# Patient Record
Sex: FEMALE | NOT HISPANIC OR LATINO | Employment: OTHER | URBAN - NONMETROPOLITAN AREA
[De-identification: names, ages, dates, MRNs, and addresses within clinical notes are randomized per-mention and may not be internally consistent; named-entity substitution may affect disease eponyms.]

---

## 2017-03-22 ENCOUNTER — OFFICE VISIT (OUTPATIENT)
Dept: FAMILY MEDICINE CLINIC | Facility: CLINIC | Age: 42
End: 2017-03-22

## 2017-03-22 VITALS
HEART RATE: 80 BPM | BODY MASS INDEX: 27.16 KG/M2 | TEMPERATURE: 97.8 F | OXYGEN SATURATION: 96 % | SYSTOLIC BLOOD PRESSURE: 112 MMHG | DIASTOLIC BLOOD PRESSURE: 78 MMHG | HEIGHT: 66 IN | WEIGHT: 169 LBS | RESPIRATION RATE: 17 BRPM

## 2017-03-22 DIAGNOSIS — F41.9 ANXIETY: Primary | ICD-10-CM

## 2017-03-22 DIAGNOSIS — R53.83 FATIGUE, UNSPECIFIED TYPE: ICD-10-CM

## 2017-03-22 DIAGNOSIS — Z00.00 ENCOUNTER FOR GENERAL ADULT MEDICAL EXAMINATION W/O ABNORMAL FINDINGS: ICD-10-CM

## 2017-03-22 DIAGNOSIS — M25.50 ARTHRALGIA, UNSPECIFIED JOINT: ICD-10-CM

## 2017-03-22 PROCEDURE — 99213 OFFICE O/P EST LOW 20 MIN: CPT | Performed by: NURSE PRACTITIONER

## 2017-03-22 RX ORDER — BUSPIRONE HYDROCHLORIDE 5 MG/1
5 TABLET ORAL 3 TIMES DAILY
Qty: 90 TABLET | Refills: 3 | Status: SHIPPED | OUTPATIENT
Start: 2017-03-22 | End: 2017-05-08

## 2017-03-22 RX ORDER — DOXEPIN HYDROCHLORIDE 75 MG/1
75 CAPSULE ORAL ONCE
Refills: 12 | COMMUNITY
Start: 2017-01-09 | End: 2017-09-19

## 2017-03-24 LAB
ALBUMIN SERPL-MCNC: 4.1 G/DL (ref 3.2–5.5)
ALBUMIN/GLOB SERPL: 1.4 G/DL (ref 1–3)
ALP SERPL-CCNC: 50 U/L (ref 15–121)
ALT SERPL W P-5'-P-CCNC: 22 U/L (ref 10–60)
ANION GAP SERPL CALCULATED.3IONS-SCNC: 12 MMOL/L (ref 5–15)
AST SERPL-CCNC: 22 U/L (ref 10–60)
BASOPHILS # BLD AUTO: 0.09 10*3/MM3 (ref 0–0.2)
BASOPHILS NFR BLD AUTO: 0.7 % (ref 0–2)
BILIRUB SERPL-MCNC: <0.3 MG/DL (ref 0.2–1)
BUN BLD-MCNC: 22 MG/DL (ref 8–25)
BUN/CREAT SERPL: 36.7 (ref 7–25)
CALCIUM SPEC-SCNC: 9.4 MG/DL (ref 8.4–10.8)
CHLORIDE SERPL-SCNC: 108 MMOL/L (ref 100–112)
CHOLEST SERPL-MCNC: 235 MG/DL (ref 150–200)
CK SERPL-CCNC: 39 U/L (ref 26–140)
CO2 SERPL-SCNC: 21 MMOL/L (ref 20–32)
CREAT BLD-MCNC: 0.6 MG/DL (ref 0.4–1.3)
CRP SERPL-MCNC: 0.8 MG/DL (ref 0–1)
DEPRECATED RDW RBC AUTO: 40.9 FL (ref 36.4–46.3)
EOSINOPHIL # BLD AUTO: 0.25 10*3/MM3 (ref 0–0.7)
EOSINOPHIL NFR BLD AUTO: 1.8 % (ref 0–7)
ERYTHROCYTE [DISTWIDTH] IN BLOOD BY AUTOMATED COUNT: 12.5 % (ref 11.5–14.5)
ERYTHROCYTE [SEDIMENTATION RATE] IN BLOOD: 10 MM/HR (ref 0–20)
GFR SERPL CREATININE-BSD FRML MDRD: 111 ML/MIN/1.73 (ref 64–149)
GFR SERPL CREATININE-BSD FRML MDRD: 135 ML/MIN/1.73 (ref 64–149)
GLOBULIN UR ELPH-MCNC: 3 GM/DL (ref 2.5–4.6)
GLUCOSE BLD-MCNC: 115 MG/DL (ref 70–100)
HBA1C MFR BLD: 5.97 % (ref 4–5.6)
HCT VFR BLD AUTO: 37 % (ref 35–45)
HDLC SERPL-MCNC: 32 MG/DL (ref 35–100)
HGB BLD-MCNC: 12.8 G/DL (ref 12–15.5)
LDLC SERPL CALC-MCNC: 163 MG/DL
LDLC/HDLC SERPL: 5.08 {RATIO}
LYMPHOCYTES # BLD AUTO: 4.37 10*3/MM3 (ref 0.6–4.2)
LYMPHOCYTES NFR BLD AUTO: 32 % (ref 10–50)
MCH RBC QN AUTO: 31.5 PG (ref 26.5–34)
MCHC RBC AUTO-ENTMCNC: 34.6 G/DL (ref 31.4–36)
MCV RBC AUTO: 91.1 FL (ref 80–98)
MONOCYTES # BLD AUTO: 0.46 10*3/MM3 (ref 0–0.9)
MONOCYTES NFR BLD AUTO: 3.4 % (ref 0–12)
NEUTROPHILS # BLD AUTO: 8.5 10*3/MM3 (ref 2–8.6)
NEUTROPHILS NFR BLD AUTO: 62.1 % (ref 37–80)
PLATELET # BLD AUTO: 330 10*3/MM3 (ref 150–450)
PMV BLD AUTO: 8.9 FL (ref 8–12)
POTASSIUM BLD-SCNC: 3.5 MMOL/L (ref 3.4–5.4)
PROT SERPL-MCNC: 7.1 G/DL (ref 6.7–8.2)
RBC # BLD AUTO: 4.06 10*6/MM3 (ref 3.77–5.16)
RHEUMATOID FACT SERPL-ACNC: NEGATIVE [IU]/ML
SODIUM BLD-SCNC: 141 MMOL/L (ref 134–146)
T4 FREE SERPL-MCNC: 1.34 NG/DL (ref 0.78–2.19)
TRIGL SERPL-MCNC: 202 MG/DL (ref 35–160)
TSH SERPL DL<=0.05 MIU/L-ACNC: 1.63 MIU/ML (ref 0.46–4.68)
VLDLC SERPL-MCNC: 40.4 MG/DL
WBC NRBC COR # BLD: 13.67 10*3/MM3 (ref 3.2–9.8)

## 2017-03-24 PROCEDURE — 86200 CCP ANTIBODY: CPT | Performed by: NURSE PRACTITIONER

## 2017-03-24 PROCEDURE — 86140 C-REACTIVE PROTEIN: CPT | Performed by: NURSE PRACTITIONER

## 2017-03-24 PROCEDURE — 86235 NUCLEAR ANTIGEN ANTIBODY: CPT | Performed by: NURSE PRACTITIONER

## 2017-03-24 PROCEDURE — 85025 COMPLETE CBC W/AUTO DIFF WBC: CPT | Performed by: NURSE PRACTITIONER

## 2017-03-24 PROCEDURE — 83036 HEMOGLOBIN GLYCOSYLATED A1C: CPT | Performed by: NURSE PRACTITIONER

## 2017-03-24 PROCEDURE — 84443 ASSAY THYROID STIM HORMONE: CPT | Performed by: NURSE PRACTITIONER

## 2017-03-24 PROCEDURE — 84439 ASSAY OF FREE THYROXINE: CPT | Performed by: NURSE PRACTITIONER

## 2017-03-24 PROCEDURE — 80061 LIPID PANEL: CPT | Performed by: NURSE PRACTITIONER

## 2017-03-24 PROCEDURE — 82550 ASSAY OF CK (CPK): CPT | Performed by: NURSE PRACTITIONER

## 2017-03-24 PROCEDURE — 36415 COLL VENOUS BLD VENIPUNCTURE: CPT | Performed by: NURSE PRACTITIONER

## 2017-03-24 PROCEDURE — 86225 DNA ANTIBODY NATIVE: CPT | Performed by: NURSE PRACTITIONER

## 2017-03-24 PROCEDURE — 80053 COMPREHEN METABOLIC PANEL: CPT | Performed by: NURSE PRACTITIONER

## 2017-03-24 PROCEDURE — 85651 RBC SED RATE NONAUTOMATED: CPT | Performed by: NURSE PRACTITIONER

## 2017-03-24 PROCEDURE — 86431 RHEUMATOID FACTOR QUANT: CPT | Performed by: NURSE PRACTITIONER

## 2017-03-27 LAB
CCP IGA+IGG SERPL IA-ACNC: 5 UNITS (ref 0–19)
CENTROMERE B AB SER-ACNC: <0.2 AI (ref 0–0.9)
CHROMATIN AB SERPL-ACNC: <0.2 AI (ref 0–0.9)
DSDNA AB SER-ACNC: <1 IU/ML (ref 0–9)
ENA JO1 AB SER-ACNC: <0.2 AI (ref 0–0.9)
ENA RNP AB SER-ACNC: <0.2 AI (ref 0–0.9)
ENA SCL70 AB SER-ACNC: <0.2 AI (ref 0–0.9)
ENA SM AB SER-ACNC: <0.2 AI (ref 0–0.9)
ENA SS-A AB SER-ACNC: <0.2 AI (ref 0–0.9)
ENA SS-B AB SER-ACNC: <0.2 AI (ref 0–0.9)
Lab: NORMAL

## 2017-03-29 ENCOUNTER — OFFICE VISIT (OUTPATIENT)
Dept: FAMILY MEDICINE CLINIC | Facility: CLINIC | Age: 42
End: 2017-03-29

## 2017-03-29 VITALS
BODY MASS INDEX: 27.16 KG/M2 | HEIGHT: 66 IN | TEMPERATURE: 97.8 F | OXYGEN SATURATION: 97 % | HEART RATE: 100 BPM | SYSTOLIC BLOOD PRESSURE: 110 MMHG | DIASTOLIC BLOOD PRESSURE: 78 MMHG | WEIGHT: 169 LBS | RESPIRATION RATE: 17 BRPM

## 2017-03-29 DIAGNOSIS — M25.50 ARTHRALGIA, UNSPECIFIED JOINT: ICD-10-CM

## 2017-03-29 DIAGNOSIS — R21 RASH: ICD-10-CM

## 2017-03-29 DIAGNOSIS — D72.829 LEUKOCYTOSIS, UNSPECIFIED TYPE: Primary | ICD-10-CM

## 2017-03-29 DIAGNOSIS — L56.8 PHOTOALLERGIC DERMATITIS: ICD-10-CM

## 2017-03-29 DIAGNOSIS — F41.9 ANXIETY: ICD-10-CM

## 2017-03-29 PROCEDURE — 99213 OFFICE O/P EST LOW 20 MIN: CPT | Performed by: NURSE PRACTITIONER

## 2017-03-29 RX ORDER — PROMETHAZINE HYDROCHLORIDE 25 MG/1
25 TABLET ORAL EVERY 6 HOURS PRN
Qty: 30 TABLET | Refills: 1 | Status: SHIPPED | OUTPATIENT
Start: 2017-03-29 | End: 2017-04-25 | Stop reason: SDUPTHER

## 2017-03-29 NOTE — PROGRESS NOTES
Chief Complaint   Patient presents with   • Joint Pain     f/u on labs       Subjective   HPI   Jeanie Jones is a 41 y.o. female presents to the office for evaluation of generalized arthralgia, rash to sun exposed areas of body with prominence to face and BUE, nausea, anxiety, and review of labwork. She had labwork completed on 3/24/2017. She voices continued c/o diffuse arthralgia and concern over this persistent rash to her face and BUE.     Given her family history with her mother being diagnosed with SLE and RA in her 40s, I was concerned Jeanie would also test positive. All autoimmune labwork is negative. She did have an elevated WBC. Will retest in 2 weeks to evaluate for chronic leukocytosis versus acute infection.   HPI    Family History   Problem Relation Age of Onset   • Rheum arthritis Mother    • Cancer Mother      lung cancer   • Heart disease Mother      Social History     Social History   • Marital status: Single     Spouse name: N/A   • Number of children: 1   • Years of education: N/A     Occupational History   • Not on file.     Social History Main Topics   • Smoking status: Current Some Day Smoker     Types: Electronic Cigarette   • Smokeless tobacco: Never Used      Comment: Does smoke occasionally uses ecig.    • Alcohol use No   • Drug use: No   • Sexual activity: No     Other Topics Concern   • Not on file     Social History Narrative       Review of Systems   Constitutional: Negative.  Negative for activity change, chills, fatigue, fever and unexpected weight change.   HENT: Negative.  Negative for congestion, ear pain, postnasal drip, rhinorrhea, sinus pressure and sore throat.    Eyes: Negative.  Negative for pain and redness.   Respiratory: Negative.  Negative for cough, choking, chest tightness, shortness of breath and wheezing.    Cardiovascular: Negative.  Negative for chest pain, palpitations and leg swelling.   Gastrointestinal: Negative.  Negative for abdominal distention,  "abdominal pain, constipation, diarrhea, nausea and rectal pain.   Endocrine: Negative.    Genitourinary: Negative.  Negative for decreased urine volume, difficulty urinating, dysuria, flank pain, hematuria and urgency.   Musculoskeletal: Positive for arthralgias and joint swelling. Negative for gait problem and neck pain.   Skin: Positive for rash. Negative for wound.   Allergic/Immunologic: Negative.    Neurological: Negative.  Negative for dizziness, syncope, weakness, light-headedness, numbness and headaches.   Hematological: Negative.    Psychiatric/Behavioral: Positive for decreased concentration. Negative for agitation, behavioral problems, confusion, self-injury, sleep disturbance and suicidal ideas. The patient is nervous/anxious.      14 Point ROS completed with pertinent positives discussed. All other systems reviewed and are negative.     The following portions of the patient's history were reviewed and updated as appropriate: allergies, current medications, past family history, past medical history, past social history, past surgical history and problem list.    Encounter Vitals  Vitals:    03/29/17 0853   BP: 110/78   Pulse: 100   Resp: 17   Temp: 97.8 °F (36.6 °C)   SpO2: 97%   Weight: 169 lb (76.7 kg)   Height: 66\" (167.6 cm)   PainSc:   6   PainLoc: Generalized       Objective:  Physical Exam   Constitutional: She is oriented to person, place, and time. Vital signs are normal. She appears well-developed and well-nourished.  Non-toxic appearance.   HENT:   Head: Normocephalic and atraumatic.   Right Ear: Tympanic membrane, external ear and ear canal normal.   Left Ear: Tympanic membrane, external ear and ear canal normal.   Nose: Nose normal.   Mouth/Throat: Uvula is midline, oropharynx is clear and moist and mucous membranes are normal. No posterior oropharyngeal edema or posterior oropharyngeal erythema.   Eyes: Lids are normal. Pupils are equal, round, and reactive to light.   Neck: Trachea normal " and normal range of motion. Neck supple. No thyroid mass present.   Cardiovascular: Normal rate, regular rhythm, S1 normal, S2 normal, normal heart sounds and intact distal pulses.  Exam reveals no gallop and no friction rub.    No murmur heard.  Pulmonary/Chest: Effort normal and breath sounds normal. No respiratory distress. She has no wheezes. She has no rales.   Abdominal: Soft. Normal appearance and bowel sounds are normal. She exhibits no mass. There is no rebound and no guarding.   Musculoskeletal: Normal range of motion.        Right elbow: Tenderness found.        Left elbow: Tenderness found.        Right knee: Tenderness found.        Left knee: Tenderness found.        Right hand: She exhibits tenderness. Decreased strength noted.        Left hand: She exhibits tenderness. Decreased strength noted.        Right foot: There is tenderness.        Left foot: There is tenderness.   Arthritic changes at bilateral elbows and bilateral knees   Lymphadenopathy:     She has no cervical adenopathy.   Neurological: She is alert and oriented to person, place, and time. She has normal strength. No cranial nerve deficit or sensory deficit. Gait normal.   Skin: Skin is warm and dry. Rash noted. Rash is macular. There is erythema.        Psychiatric: Her speech is normal and behavior is normal. Judgment and thought content normal. Her mood appears anxious. Cognition and memory are normal.   Nursing note and vitals reviewed.      Pertinent Labs  Office Visit on 03/22/2017   Component Date Value Ref Range Status   • Glucose 03/24/2017 115* 70 - 100 mg/dL Final   • BUN 03/24/2017 22  8 - 25 mg/dL Final   • Creatinine 03/24/2017 0.60  0.40 - 1.30 mg/dL Final   • Sodium 03/24/2017 141  134 - 146 mmol/L Final   • Potassium 03/24/2017 3.5  3.4 - 5.4 mmol/L Final   • Chloride 03/24/2017 108  100 - 112 mmol/L Final   • CO2 03/24/2017 21.0  20.0 - 32.0 mmol/L Final   • Calcium 03/24/2017 9.4  8.4 - 10.8 mg/dL Final   • Total  Protein 03/24/2017 7.1  6.7 - 8.2 g/dL Final   • Albumin 03/24/2017 4.10  3.20 - 5.50 g/dL Final   • ALT (SGPT) 03/24/2017 22  10 - 60 U/L Final   • AST (SGOT) 03/24/2017 22  10 - 60 U/L Final   • Alkaline Phosphatase 03/24/2017 50  15 - 121 U/L Final   • Total Bilirubin 03/24/2017 <0.3  0.2 - 1.0 mg/dL Final   • eGFR Non African Amer 03/24/2017 111  64 - 149 mL/min/1.73 Final   • eGFR  African Amer 03/24/2017 135  64 - 149 mL/min/1.73 Final   • Globulin 03/24/2017 3.0  2.5 - 4.6 gm/dL Final   • A/G Ratio 03/24/2017 1.4  1.0 - 3.0 g/dL Final   • BUN/Creatinine Ratio 03/24/2017 36.7* 7.0 - 25.0 Final   • Anion Gap 03/24/2017 12.0  5.0 - 15.0 mmol/L Final   • Hemoglobin A1C 03/24/2017 5.97* 4 - 5.6 % Final   • Total Cholesterol 03/24/2017 235* 150 - 200 mg/dL Final   • Triglycerides 03/24/2017 202* 35 - 160 mg/dL Final   • HDL Cholesterol 03/24/2017 32* 35 - 100 mg/dL Final   • LDL Cholesterol  03/24/2017 163  mg/dL Final   • VLDL Cholesterol 03/24/2017 40.4  mg/dL Final   • LDL/HDL Ratio 03/24/2017 5.08   Final   • TSH 03/24/2017 1.630  0.460 - 4.680 mIU/mL Final   • Free T4 03/24/2017 1.34  0.78 - 2.19 ng/dL Final   • Anti-DNA (DS) Ab Qn 03/24/2017 <1  0 - 9 IU/mL Final                                       Negative      <5                                     Equivocal  5 - 9                                     Positive      >9   • RNP Antibodies 03/24/2017 <0.2  0.0 - 0.9 AI Final   • Nieves Antibodies 03/24/2017 <0.2  0.0 - 0.9 AI Final   • Antiscleroderma-70 Antibodies 03/24/2017 <0.2  0.0 - 0.9 AI Final   • PARVIZ SSA (RO) Ab 03/24/2017 <0.2  0.0 - 0.9 AI Final   • PARVIZ SSB (LA) Ab 03/24/2017 <0.2  0.0 - 0.9 AI Final   • Antichromatin Antibodies 03/24/2017 <0.2  0.0 - 0.9 AI Final   • TRENT-1 IgG 03/24/2017 <0.2  0.0 - 0.9 AI Final   • Anti-Centromere B Antibodies 03/24/2017 <0.2  0.0 - 0.9 AI Final   • See below: 03/24/2017 Comment   Final    Comment: Autoantibody                       Disease  Association  ------------------------------------------------------------                          Condition                  Frequency  ---------------------   ------------------------   ---------  Antinuclear Antibody,    SLE, mixed connective  Direct (MANUEL-D)           tissue diseases  ---------------------   ------------------------   ---------  dsDNA                    SLE                        40 - 60%  ---------------------   ------------------------   ---------  Chromatin                Drug induced SLE                90%                           SLE                        48 - 97%  ---------------------   ------------------------   ---------  SSA (Ro)                 SLE                        25 - 35%                           Sjogren's Syndrome         40 - 70%                            Lupus                 100%  ---------------------   ------------------------   ---------  SSB (La)                 SLE                                                        10%                           Sjogren's Syndrome              30%  ---------------------   -----------------------    ---------  Sm (anti-Smith)          SLE                        15 - 30%  ---------------------   -----------------------    ---------  RNP                      Mixed Connective Tissue                           Disease                         95%  (U1 nRNP,                SLE                        30 - 50%  anti-ribonucleoprotein)  Polymyositis and/or                           Dermatomyositis                 20%  ---------------------   ------------------------   ---------  Scl-70 (antiDNA          Scleroderma (diffuse)      20 - 35%  topoisomerase)           Crest                           13%  ---------------------   ------------------------   ---------  Lashanda-1                     Polymyositis and/or                           Dermatomyositis            20 - 40%  ---------------------   ------------------------    ---------  Centromere B             Scleroderma -                            Crest                           variant                         80%   • CCP Antibodies IgG/IgA 03/24/2017 5  0 - 19 units Final                              Negative               <20                            Weak positive      20 - 39                            Moderate positive  40 - 59                            Strong positive        >59   • Creatine Kinase 03/24/2017 39  26 - 140 U/L Final   • C-Reactive Protein 03/24/2017 0.80  0.00 - 1.00 mg/dL Final   • Rheumatoid Factor Qualitative 03/24/2017 Negative  Negative Final   • Sed Rate 03/24/2017 10  0 - 20 mm/hr Final   • WBC 03/24/2017 13.67* 3.20 - 9.80 10*3/mm3 Final   • RBC 03/24/2017 4.06  3.77 - 5.16 10*6/mm3 Final   • Hemoglobin 03/24/2017 12.8  12.0 - 15.5 g/dL Final   • Hematocrit 03/24/2017 37.0  35.0 - 45.0 % Final   • MCV 03/24/2017 91.1  80.0 - 98.0 fL Final   • MCH 03/24/2017 31.5  26.5 - 34.0 pg Final   • MCHC 03/24/2017 34.6  31.4 - 36.0 g/dL Final   • RDW 03/24/2017 12.5  11.5 - 14.5 % Final   • RDW-SD 03/24/2017 40.9  36.4 - 46.3 fl Final   • MPV 03/24/2017 8.9  8.0 - 12.0 fL Final   • Platelets 03/24/2017 330  150 - 450 10*3/mm3 Final   • Neutrophil % 03/24/2017 62.1  37.0 - 80.0 % Final   • Lymphocyte % 03/24/2017 32.0  10.0 - 50.0 % Final   • Monocyte % 03/24/2017 3.4  0.0 - 12.0 % Final   • Eosinophil % 03/24/2017 1.8  0.0 - 7.0 % Final   • Basophil % 03/24/2017 0.7  0.0 - 2.0 % Final   • Neutrophils, Absolute 03/24/2017 8.50  2.00 - 8.60 10*3/mm3 Final   • Lymphocytes, Absolute 03/24/2017 4.37* 0.60 - 4.20 10*3/mm3 Final   • Monocytes, Absolute 03/24/2017 0.46  0.00 - 0.90 10*3/mm3 Final   • Eosinophils, Absolute 03/24/2017 0.25  0.00 - 0.70 10*3/mm3 Final   • Basophils, Absolute 03/24/2017 0.09  0.00 - 0.20 10*3/mm3 Final     Labs have been independently reviewed    Key Imaging/Tracings/POC Testing    Assessment and Plan  Jeanie was seen today for joint  pain.    Diagnoses and all orders for this visit:    Leukocytosis, unspecified type  -     CBC & Differential    Photoallergic dermatitis  -     Ambulatory Referral to Dermatology    Rash  -     Ambulatory Referral to Dermatology    Arthralgia, unspecified joint  -     Ambulatory Referral to Rheumatology    Anxiety    Other orders  -     promethazine (PHENERGAN) 25 MG tablet; Take 1 tablet by mouth Every 6 (Six) Hours As Needed for Nausea or Vomiting.      Side effects of ordered medications discussed with patient.     Additional Notes/Instructions  Refer to derm to assess for photoallergic dermatitis versus eczema versus unknown  Refer to rheumatology for diffuse arthralgia   Diet modifications to decrease LDL and increase HDL X 6 months  Follow-Up  Return if symptoms worsen or fail to improve, for after referrals completed.    Patient/caregiver verbalizes understanding of all orders and instructions in this plan of care.

## 2017-03-31 ENCOUNTER — OFFICE VISIT (OUTPATIENT)
Dept: FAMILY MEDICINE CLINIC | Facility: CLINIC | Age: 42
End: 2017-03-31

## 2017-03-31 VITALS
SYSTOLIC BLOOD PRESSURE: 120 MMHG | TEMPERATURE: 98.2 F | OXYGEN SATURATION: 96 % | DIASTOLIC BLOOD PRESSURE: 82 MMHG | BODY MASS INDEX: 27.16 KG/M2 | HEART RATE: 89 BPM | WEIGHT: 169 LBS | HEIGHT: 66 IN

## 2017-03-31 DIAGNOSIS — R50.81 FEVER IN OTHER DISEASES: ICD-10-CM

## 2017-03-31 DIAGNOSIS — J01.10 ACUTE FRONTAL SINUSITIS, RECURRENCE NOT SPECIFIED: Primary | ICD-10-CM

## 2017-03-31 LAB
EXPIRATION DATE: ABNORMAL
FLUAV AG NPH QL: ABNORMAL
FLUBV AG NPH QL: ABNORMAL
INTERNAL CONTROL: ABNORMAL
Lab: ABNORMAL

## 2017-03-31 PROCEDURE — 87804 INFLUENZA ASSAY W/OPTIC: CPT | Performed by: NURSE PRACTITIONER

## 2017-03-31 PROCEDURE — 99214 OFFICE O/P EST MOD 30 MIN: CPT | Performed by: NURSE PRACTITIONER

## 2017-03-31 RX ORDER — AMOXICILLIN AND CLAVULANATE POTASSIUM 875; 125 MG/1; MG/1
1 TABLET, FILM COATED ORAL 2 TIMES DAILY
Qty: 20 TABLET | Refills: 0 | Status: SHIPPED | OUTPATIENT
Start: 2017-03-31 | End: 2017-04-10

## 2017-03-31 RX ORDER — FLUCONAZOLE 150 MG/1
150 TABLET ORAL ONCE
Qty: 2 TABLET | Refills: 0 | Status: SHIPPED | OUTPATIENT
Start: 2017-03-31 | End: 2017-03-31

## 2017-03-31 NOTE — PROGRESS NOTES
Chief Complaint   Patient presents with   • Illness     Possible flu started Tuesday. Sore thorat,headache,body aches.        Subjective   HPI   Jeanie Jones is a 41 y.o. female presents to the office for evaluation of:  Illness   This is a new problem. The current episode started in the past 7 days. The problem occurs constantly. The problem has been unchanged. Associated symptoms include arthralgias, chest pain, congestion, coughing, fatigue, a fever (99.9), myalgias and a sore throat. Pertinent negatives include no abdominal pain, chills, headaches, nausea, neck pain, numbness, rash or weakness. Nothing aggravates the symptoms. She has tried nothing for the symptoms. The treatment provided no relief.     She states she felt really bad yesterday, but she is feeling better today.   Today she presents with no acute complaints.     Family History   Problem Relation Age of Onset   • Rheum arthritis Mother    • Cancer Mother      lung cancer   • Heart disease Mother      Social History     Social History   • Marital status: Single     Spouse name: N/A   • Number of children: 1   • Years of education: N/A     Occupational History   • Not on file.     Social History Main Topics   • Smoking status: Current Some Day Smoker     Types: Electronic Cigarette   • Smokeless tobacco: Never Used      Comment: Does smoke occasionally uses ecig.    • Alcohol use No   • Drug use: No   • Sexual activity: No     Other Topics Concern   • Not on file     Social History Narrative       Review of Systems   Constitutional: Positive for fatigue and fever (99.9). Negative for activity change, chills and unexpected weight change.   HENT: Positive for congestion, sinus pressure and sore throat. Negative for ear pain, postnasal drip and rhinorrhea.    Eyes: Negative.  Negative for pain and redness.   Respiratory: Positive for cough. Negative for choking, chest tightness, shortness of breath and wheezing.    Cardiovascular: Positive for  "chest pain. Negative for palpitations and leg swelling.   Gastrointestinal: Negative.  Negative for abdominal distention, abdominal pain, constipation, diarrhea, nausea and rectal pain.   Endocrine: Negative.    Genitourinary: Negative.  Negative for decreased urine volume, difficulty urinating, dysuria, flank pain, hematuria and urgency.   Musculoskeletal: Positive for arthralgias and myalgias. Negative for gait problem and neck pain.   Skin: Negative.  Negative for rash and wound.   Allergic/Immunologic: Negative.    Neurological: Negative.  Negative for dizziness, syncope, weakness, light-headedness, numbness and headaches.   Hematological: Negative.    Psychiatric/Behavioral: Negative.  Negative for agitation, behavioral problems, confusion, self-injury, sleep disturbance and suicidal ideas. The patient is not nervous/anxious.      14 Point ROS completed with pertinent positives discussed. All other systems reviewed and are negative.     The following portions of the patient's history were reviewed and updated as appropriate: allergies, current medications, past family history, past medical history, past social history, past surgical history and problem list.    Encounter Vitals  Vitals:    03/31/17 1018   BP: 120/82   Pulse: 89   Temp: 98.2 °F (36.8 °C)   SpO2: 96%   Weight: 169 lb (76.7 kg)   Height: 66\" (167.6 cm)   PainSc: 0-No pain       Objective:  Physical Exam   Constitutional: She is oriented to person, place, and time. Vital signs are normal. She appears well-developed and well-nourished.   HENT:   Head: Normocephalic and atraumatic.   Right Ear: Tympanic membrane, external ear and ear canal normal.   Left Ear: Tympanic membrane, external ear and ear canal normal.   Nose: Right sinus exhibits maxillary sinus tenderness and frontal sinus tenderness. Left sinus exhibits maxillary sinus tenderness and frontal sinus tenderness.   Mouth/Throat: Uvula is midline, oropharynx is clear and moist and mucous " membranes are normal. No posterior oropharyngeal edema or posterior oropharyngeal erythema. No tonsillar exudate.   Clear post nasal drip   Eyes: Lids are normal. Pupils are equal, round, and reactive to light.   Neck: Trachea normal and normal range of motion. Neck supple. No thyroid mass present.   Cardiovascular: Normal rate, regular rhythm, S1 normal, S2 normal, normal heart sounds and intact distal pulses.  Exam reveals no gallop and no friction rub.    No murmur heard.  Pulmonary/Chest: Effort normal and breath sounds normal. No respiratory distress. She has no wheezes. She has no rales.   Abdominal: Soft. Normal appearance and bowel sounds are normal. She exhibits no mass. There is no rebound and no guarding.   Musculoskeletal: Normal range of motion.   Lymphadenopathy:     She has no cervical adenopathy.   Neurological: She is alert and oriented to person, place, and time. She has normal strength. No cranial nerve deficit or sensory deficit. Gait normal.   Skin: Skin is warm and dry. No rash noted.   Psychiatric: She has a normal mood and affect. Her speech is normal and behavior is normal. Judgment and thought content normal. Cognition and memory are normal.   Nursing note and vitals reviewed.      Pertinent Labs      Key Imaging/Tracings/POC Testing  Lab Results   Component Value Date    RAPFLUA NEG 03/31/2017    RAPFLUB NEG 03/31/2017     Assessment and Plan  Jeanie was seen today for illness.    Diagnoses and all orders for this visit:    Acute frontal sinusitis, recurrence not specified    Fever in other diseases  -     POCT Influenza A/B    Other orders  -     amoxicillin-clavulanate (AUGMENTIN) 875-125 MG per tablet; Take 1 tablet by mouth 2 (Two) Times a Day for 10 days.  -     fluconazole (DIFLUCAN) 150 MG tablet; Take 1 tablet by mouth 1 (One) Time for 1 dose.      Side effects of ordered medications discussed with patient.     Additional Notes/Instructions    Follow-Up  Return if symptoms worsen or  fail to improve.    Patient/caregiver verbalizes understanding of all orders and instructions in this plan of care.

## 2017-04-25 RX ORDER — PROMETHAZINE HYDROCHLORIDE 25 MG/1
25 TABLET ORAL EVERY 6 HOURS PRN
Qty: 60 TABLET | Refills: 1 | Status: SHIPPED | OUTPATIENT
Start: 2017-04-25 | End: 2017-06-21 | Stop reason: SDUPTHER

## 2017-05-08 ENCOUNTER — OFFICE VISIT (OUTPATIENT)
Dept: FAMILY MEDICINE CLINIC | Facility: CLINIC | Age: 42
End: 2017-05-08

## 2017-05-08 VITALS
TEMPERATURE: 98.2 F | WEIGHT: 173 LBS | BODY MASS INDEX: 27.8 KG/M2 | SYSTOLIC BLOOD PRESSURE: 132 MMHG | DIASTOLIC BLOOD PRESSURE: 84 MMHG | RESPIRATION RATE: 17 BRPM | OXYGEN SATURATION: 95 % | HEIGHT: 66 IN | HEART RATE: 127 BPM

## 2017-05-08 DIAGNOSIS — R21 RASH: Primary | ICD-10-CM

## 2017-05-08 DIAGNOSIS — F41.9 ANXIETY: ICD-10-CM

## 2017-05-08 DIAGNOSIS — L50.9 URTICARIA: ICD-10-CM

## 2017-05-08 PROCEDURE — 96372 THER/PROPH/DIAG INJ SC/IM: CPT | Performed by: NURSE PRACTITIONER

## 2017-05-08 PROCEDURE — 99213 OFFICE O/P EST LOW 20 MIN: CPT | Performed by: NURSE PRACTITIONER

## 2017-05-08 RX ORDER — FAMOTIDINE 40 MG/1
40 TABLET, FILM COATED ORAL DAILY
Qty: 30 TABLET | Refills: 0 | Status: SHIPPED | OUTPATIENT
Start: 2017-05-08 | End: 2017-06-21 | Stop reason: SDUPTHER

## 2017-05-08 RX ORDER — HYDROXYZINE 50 MG/1
50 TABLET, FILM COATED ORAL EVERY 6 HOURS PRN
Qty: 60 TABLET | Refills: 3 | Status: SHIPPED | OUTPATIENT
Start: 2017-05-08 | End: 2017-09-18 | Stop reason: SDUPTHER

## 2017-05-08 RX ORDER — METHYLPREDNISOLONE ACETATE 80 MG/ML
80 INJECTION, SUSPENSION INTRA-ARTICULAR; INTRALESIONAL; INTRAMUSCULAR; SOFT TISSUE ONCE
Status: COMPLETED | OUTPATIENT
Start: 2017-05-08 | End: 2017-05-08

## 2017-05-08 RX ADMIN — METHYLPREDNISOLONE ACETATE 80 MG: 80 INJECTION, SUSPENSION INTRA-ARTICULAR; INTRALESIONAL; INTRAMUSCULAR; SOFT TISSUE at 11:22

## 2017-05-09 RX ORDER — PREDNISONE 20 MG/1
20 TABLET ORAL 2 TIMES DAILY
Qty: 10 TABLET | Refills: 0 | Status: SHIPPED | OUTPATIENT
Start: 2017-05-09 | End: 2017-08-18

## 2017-06-21 RX ORDER — PROMETHAZINE HYDROCHLORIDE 25 MG/1
TABLET ORAL
Qty: 60 TABLET | Refills: 1 | Status: SHIPPED | OUTPATIENT
Start: 2017-06-21 | End: 2017-08-18 | Stop reason: SDUPTHER

## 2017-06-21 RX ORDER — FAMOTIDINE 40 MG/1
TABLET, FILM COATED ORAL
Qty: 30 TABLET | Refills: 0 | Status: SHIPPED | OUTPATIENT
Start: 2017-06-21 | End: 2017-08-19 | Stop reason: SDUPTHER

## 2017-08-18 ENCOUNTER — OFFICE VISIT (OUTPATIENT)
Dept: FAMILY MEDICINE CLINIC | Facility: CLINIC | Age: 42
End: 2017-08-18

## 2017-08-18 VITALS
WEIGHT: 170 LBS | OXYGEN SATURATION: 93 % | RESPIRATION RATE: 18 BRPM | SYSTOLIC BLOOD PRESSURE: 124 MMHG | HEIGHT: 66 IN | DIASTOLIC BLOOD PRESSURE: 86 MMHG | BODY MASS INDEX: 27.32 KG/M2 | TEMPERATURE: 97.8 F | HEART RATE: 86 BPM

## 2017-08-18 DIAGNOSIS — R81 GLUCOSURIA: ICD-10-CM

## 2017-08-18 DIAGNOSIS — R79.9 ABNORMAL FINDING OF BLOOD CHEMISTRY: ICD-10-CM

## 2017-08-18 DIAGNOSIS — N30.01 ACUTE CYSTITIS WITH HEMATURIA: ICD-10-CM

## 2017-08-18 DIAGNOSIS — R30.0 DYSURIA: Primary | ICD-10-CM

## 2017-08-18 LAB
BILIRUB BLD-MCNC: NEGATIVE MG/DL
CLARITY, POC: CLEAR
COLOR UR: YELLOW
GLUCOSE UR STRIP-MCNC: ABNORMAL MG/DL
KETONES UR QL: NEGATIVE
LEUKOCYTE EST, POC: NEGATIVE
NITRITE UR-MCNC: POSITIVE MG/ML
PH UR: 6 [PH] (ref 5–8)
PROT UR STRIP-MCNC: NEGATIVE MG/DL
RBC # UR STRIP: ABNORMAL /UL
SP GR UR: 1.02 (ref 1–1.03)
UROBILINOGEN UR QL: NORMAL

## 2017-08-18 PROCEDURE — 87086 URINE CULTURE/COLONY COUNT: CPT | Performed by: NURSE PRACTITIONER

## 2017-08-18 PROCEDURE — 81003 URINALYSIS AUTO W/O SCOPE: CPT | Performed by: NURSE PRACTITIONER

## 2017-08-18 PROCEDURE — 96372 THER/PROPH/DIAG INJ SC/IM: CPT | Performed by: NURSE PRACTITIONER

## 2017-08-18 PROCEDURE — 99214 OFFICE O/P EST MOD 30 MIN: CPT | Performed by: NURSE PRACTITIONER

## 2017-08-18 RX ORDER — NITROFURANTOIN 25; 75 MG/1; MG/1
100 CAPSULE ORAL 2 TIMES DAILY
Qty: 14 CAPSULE | Refills: 0 | Status: SHIPPED | OUTPATIENT
Start: 2017-08-18 | End: 2017-09-01

## 2017-08-18 RX ORDER — CEFTRIAXONE 1 G/1
1 INJECTION, POWDER, FOR SOLUTION INTRAMUSCULAR; INTRAVENOUS ONCE
Status: COMPLETED | OUTPATIENT
Start: 2017-08-18 | End: 2017-08-18

## 2017-08-18 RX ORDER — PROMETHAZINE HYDROCHLORIDE 25 MG/1
25 TABLET ORAL EVERY 6 HOURS PRN
Qty: 60 TABLET | Refills: 1 | Status: SHIPPED | OUTPATIENT
Start: 2017-08-18 | End: 2017-10-05 | Stop reason: SDUPTHER

## 2017-08-18 RX ORDER — PHENAZOPYRIDINE HYDROCHLORIDE 200 MG/1
200 TABLET, FILM COATED ORAL 3 TIMES DAILY PRN
Qty: 30 TABLET | Refills: 0 | Status: SHIPPED | OUTPATIENT
Start: 2017-08-18 | End: 2017-09-19

## 2017-08-18 RX ADMIN — CEFTRIAXONE 1 G: 1 INJECTION, POWDER, FOR SOLUTION INTRAMUSCULAR; INTRAVENOUS at 15:14

## 2017-08-18 NOTE — PROGRESS NOTES
Chief Complaint   Patient presents with   • Urinary Tract Infection     onset couple months ago       Subjective   Jeanie Jones is a 42 y.o. female presents to the office for evaluation of UTI like symptoms X 2-3 months ago.    HPI   She states she has had lower back pain and right flank pain. She has been taking AZO with good relief of symptoms. She has had intermittent fever. Associated symptoms include frequency and dysuria in absence of AZO. She denies urgency. She has also had intermittent hematuria. She has not been in to see PCP because she has been treating symptoms with AZO with good relief.     She was previously followed by Dr. Armenta, urology. She has had frequent kidney stones. She denies feeling like she has a kidney stone today.     Urinary Tract Infection    This is a new problem. The current episode started more than 1 month ago. The problem occurs intermittently. The problem has been unchanged. The quality of the pain is described as burning and stabbing. The pain is at a severity of 6/10. The pain is mild. There has been no fever. The fever has been present for less than 1 day. She is not sexually active. There is no history of pyelonephritis. Associated symptoms include flank pain, frequency, hematuria and nausea. Pertinent negatives include no chills or urgency. Treatments tried: azo. The treatment provided no relief. Her past medical history is significant for kidney stones.     Family History   Problem Relation Age of Onset   • Rheum arthritis Mother    • Cancer Mother      lung cancer   • Heart disease Mother      Social History     Social History   • Marital status: Single     Spouse name: N/A   • Number of children: 1   • Years of education: N/A     Occupational History   • Not on file.     Social History Main Topics   • Smoking status: Current Some Day Smoker     Types: Electronic Cigarette   • Smokeless tobacco: Never Used      Comment: Does smoke occasionally uses ecig.    • Alcohol  "use No   • Drug use: No   • Sexual activity: No     Other Topics Concern   • Not on file     Social History Narrative       The following portions of the patient's history were reviewed and updated as appropriate: allergies, current medications, past family history, past medical history, past social history, past surgical history and problem list.    Review of Systems   Constitutional: Negative.  Negative for activity change, chills, fatigue, fever and unexpected weight change.   HENT: Negative.  Negative for congestion, ear pain, postnasal drip, rhinorrhea, sinus pressure and sore throat.    Eyes: Negative.  Negative for pain and redness.   Respiratory: Negative.  Negative for cough, choking, chest tightness, shortness of breath and wheezing.    Cardiovascular: Negative.  Negative for chest pain, palpitations and leg swelling.   Gastrointestinal: Positive for nausea. Negative for abdominal distention, abdominal pain, constipation, diarrhea and rectal pain.   Endocrine: Negative.    Genitourinary: Positive for difficulty urinating, flank pain, frequency and hematuria. Negative for decreased urine volume, dysuria and urgency.   Musculoskeletal: Negative for gait problem and neck pain.   Skin: Negative.  Negative for rash and wound.   Allergic/Immunologic: Negative.    Neurological: Negative.  Negative for dizziness, syncope, weakness, light-headedness, numbness and headaches.   Hematological: Negative.    Psychiatric/Behavioral: Negative.  Negative for agitation, behavioral problems, confusion, self-injury, sleep disturbance and suicidal ideas. The patient is not nervous/anxious.      14 Point ROS completed with pertinent positives discussed. All other systems reviewed and are negative.       Objective   Encounter Vitals  /86  Pulse 86  Temp 97.8 °F (36.6 °C) (Tympanic)   Resp 18  Ht 66\" (167.6 cm)  Wt 170 lb (77.1 kg)  SpO2 93%  Breastfeeding? No  BMI 27.44 kg/m2    Physical Exam   Constitutional: She " is oriented to person, place, and time. Vital signs are normal. She appears well-developed and well-nourished.   HENT:   Head: Normocephalic and atraumatic.   Right Ear: Tympanic membrane, external ear and ear canal normal.   Left Ear: Tympanic membrane, external ear and ear canal normal.   Nose: Nose normal.   Mouth/Throat: Uvula is midline, oropharynx is clear and moist and mucous membranes are normal. No posterior oropharyngeal edema or posterior oropharyngeal erythema.   Eyes: Lids are normal. Pupils are equal, round, and reactive to light.   Neck: Trachea normal and normal range of motion. Neck supple. No thyroid mass present.   Cardiovascular: Normal rate, regular rhythm, S1 normal, S2 normal, normal heart sounds and intact distal pulses.  Exam reveals no gallop and no friction rub.    No murmur heard.  Pulmonary/Chest: Effort normal and breath sounds normal. No respiratory distress. She has no wheezes. She has no rales.   Abdominal: Soft. Normal appearance and bowel sounds are normal. She exhibits no mass. There is tenderness in the suprapubic area. There is CVA tenderness. There is no rebound and no guarding.   Musculoskeletal: Normal range of motion.   Lymphadenopathy:     She has no cervical adenopathy.   Neurological: She is alert and oriented to person, place, and time. She has normal strength. No cranial nerve deficit or sensory deficit. Gait normal.   Skin: Skin is warm and dry. No rash noted.   Psychiatric: She has a normal mood and affect. Her speech is normal and behavior is normal. Judgment and thought content normal. Cognition and memory are normal.   Nursing note and vitals reviewed.      Pertinent Labs  Office Visit on 08/18/2017   Component Date Value Ref Range Status   • Color 08/18/2017 Yellow  Yellow, Straw, Dark Yellow, Maureen Final   • Clarity, UA 08/18/2017 Clear  Clear Final   • Glucose, UA 08/18/2017 3+* Negative, 1000 mg/dL (3+) mg/dL Final   • Bilirubin 08/18/2017 Negative  Negative  Final   • Ketones, UA 08/18/2017 Negative  Negative Final   • Specific Gravity  08/18/2017 1.025  1.005 - 1.030 Final   • Blood, UA 08/18/2017 Trace* Negative Final   • pH, Urine 08/18/2017 6.0  5.0 - 8.0 Final   • Protein, POC 08/18/2017 Negative  Negative mg/dL Final   • Urobilinogen, UA 08/18/2017 Normal  Normal Final   • Leukocytes 08/18/2017 Negative  Negative Final   • Nitrite, UA 08/18/2017 Positive* Negative Final     Labs have been independently reviewed    Key Imaging/Tracings/POC Testing    Assessment and Medications  Problems Addressed this Visit     None      Visit Diagnoses     Dysuria    -  Primary    Relevant Orders    POCT urinalysis dipstick, automated (Completed)    Acute cystitis with hematuria        Relevant Medications    nitrofurantoin, macrocrystal-monohydrate, (MACROBID) 100 MG capsule    phenazopyridine (PYRIDIUM) 200 MG tablet    cefTRIAXone (ROCEPHIN) injection 1 g (Start on 8/18/2017  3:30 PM)    Glucosuria        Relevant Orders    CBC & Differential    Comprehensive Metabolic Panel    Hemoglobin A1c    Abnormal finding of blood chemistry         Relevant Orders    Hemoglobin A1c        Side effects of ordered medications discussed with patient.     Plan/Additional Notes/Instructions  Plan   1. Rocephin injection today  2. Start macrobid today  3. Stop AZO  4. May start pyridium PRN X 2 days  5. Increase PO water intake  6. Complete fasting labs on Monday  7. Will call with results of above ordered studies and advance/change POC as needed after review    Follow-Up  Return in about 1 week (around 8/25/2017), or if symptoms worsen or fail to improve.    Patient/caregiver verbalizes understanding of all orders and instructions in this plan of care.       This document has been electronically signed by LEILANI Gallagher on August 18, 2017 2:54 PM

## 2017-08-20 LAB — BACTERIA SPEC AEROBE CULT: NORMAL

## 2017-08-22 LAB
ALBUMIN SERPL-MCNC: 3.9 G/DL (ref 3.2–5.5)
ALBUMIN/GLOB SERPL: 1.2 G/DL (ref 1–3)
ALP SERPL-CCNC: 43 U/L (ref 15–121)
ALT SERPL W P-5'-P-CCNC: 35 U/L (ref 10–60)
ANION GAP SERPL CALCULATED.3IONS-SCNC: 15 MMOL/L (ref 5–15)
AST SERPL-CCNC: 24 U/L (ref 10–60)
BILIRUB SERPL-MCNC: 0.4 MG/DL (ref 0.2–1)
BUN BLD-MCNC: 18 MG/DL (ref 8–25)
BUN/CREAT SERPL: 22.5 (ref 7–25)
CALCIUM SPEC-SCNC: 9.2 MG/DL (ref 8.4–10.8)
CHLORIDE SERPL-SCNC: 97 MMOL/L (ref 100–112)
CO2 SERPL-SCNC: 26 MMOL/L (ref 20–32)
CREAT BLD-MCNC: 0.8 MG/DL (ref 0.4–1.3)
DEPRECATED RDW RBC AUTO: 42.7 FL (ref 36.4–46.3)
EOSINOPHIL # BLD MANUAL: 0.7 10*3/MM3 (ref 0–0.7)
EOSINOPHIL NFR BLD MANUAL: 3 % (ref 0–7)
ERYTHROCYTE [DISTWIDTH] IN BLOOD BY AUTOMATED COUNT: 12.7 % (ref 11.5–14.5)
GFR SERPL CREATININE-BSD FRML MDRD: 79 ML/MIN/1.73 (ref 55–135)
GFR SERPL CREATININE-BSD FRML MDRD: 96 ML/MIN/1.73 (ref 58–135)
GLOBULIN UR ELPH-MCNC: 3.3 GM/DL (ref 2.5–4.6)
GLUCOSE BLD-MCNC: 113 MG/DL (ref 70–100)
HCT VFR BLD AUTO: 42.5 % (ref 35–45)
HGB BLD-MCNC: 14.2 G/DL (ref 12–15.5)
LYMPHOCYTES # BLD MANUAL: 9.58 10*3/MM3 (ref 0.6–4.2)
LYMPHOCYTES NFR BLD MANUAL: 4 % (ref 0–12)
LYMPHOCYTES NFR BLD MANUAL: 41 % (ref 10–50)
MCH RBC QN AUTO: 30.9 PG (ref 26.5–34)
MCHC RBC AUTO-ENTMCNC: 33.4 G/DL (ref 31.4–36)
MCV RBC AUTO: 92.4 FL (ref 80–98)
MONOCYTES # BLD AUTO: 0.93 10*3/MM3 (ref 0–0.9)
NEUTROPHILS # BLD AUTO: 12.15 10*3/MM3 (ref 2–8.6)
NEUTROPHILS NFR BLD MANUAL: 52 % (ref 37–80)
PLATELET # BLD AUTO: 427 10*3/MM3 (ref 150–450)
PMV BLD AUTO: 8.6 FL (ref 8–12)
POTASSIUM BLD-SCNC: 3.5 MMOL/L (ref 3.4–5.4)
PROT SERPL-MCNC: 7.2 G/DL (ref 6.7–8.2)
RBC # BLD AUTO: 4.6 10*6/MM3 (ref 3.77–5.16)
RBC MORPH BLD: NORMAL
SMALL PLATELETS BLD QL SMEAR: ADEQUATE
SODIUM BLD-SCNC: 138 MMOL/L (ref 134–146)
WBC MORPH BLD: NORMAL
WBC NRBC COR # BLD: 23.36 10*3/MM3 (ref 3.2–9.8)

## 2017-08-22 PROCEDURE — 36415 COLL VENOUS BLD VENIPUNCTURE: CPT | Performed by: NURSE PRACTITIONER

## 2017-08-22 PROCEDURE — 83036 HEMOGLOBIN GLYCOSYLATED A1C: CPT | Performed by: NURSE PRACTITIONER

## 2017-08-22 PROCEDURE — 80053 COMPREHEN METABOLIC PANEL: CPT | Performed by: NURSE PRACTITIONER

## 2017-08-22 PROCEDURE — 85025 COMPLETE CBC W/AUTO DIFF WBC: CPT | Performed by: NURSE PRACTITIONER

## 2017-08-22 RX ORDER — FAMOTIDINE 40 MG/1
TABLET, FILM COATED ORAL
Qty: 30 TABLET | Refills: 0 | Status: SHIPPED | OUTPATIENT
Start: 2017-08-22 | End: 2017-09-18 | Stop reason: SDUPTHER

## 2017-08-23 ENCOUNTER — OFFICE VISIT (OUTPATIENT)
Dept: FAMILY MEDICINE CLINIC | Facility: CLINIC | Age: 42
End: 2017-08-23

## 2017-08-23 VITALS
HEIGHT: 66 IN | OXYGEN SATURATION: 94 % | BODY MASS INDEX: 27.32 KG/M2 | TEMPERATURE: 98.2 F | RESPIRATION RATE: 18 BRPM | HEART RATE: 99 BPM | WEIGHT: 170 LBS | DIASTOLIC BLOOD PRESSURE: 86 MMHG | SYSTOLIC BLOOD PRESSURE: 128 MMHG

## 2017-08-23 DIAGNOSIS — N39.0 URINARY TRACT INFECTION, SITE UNSPECIFIED: ICD-10-CM

## 2017-08-23 DIAGNOSIS — E11.9 TYPE 2 DIABETES MELLITUS WITHOUT COMPLICATION, WITHOUT LONG-TERM CURRENT USE OF INSULIN (HCC): Primary | ICD-10-CM

## 2017-08-23 PROBLEM — K21.9 GERD (GASTROESOPHAGEAL REFLUX DISEASE): Status: ACTIVE | Noted: 2017-08-23

## 2017-08-23 LAB
BILIRUB BLD-MCNC: NEGATIVE MG/DL
CLARITY, POC: CLEAR
COLOR UR: YELLOW
GLUCOSE UR STRIP-MCNC: NEGATIVE MG/DL
HBA1C MFR BLD: 7.3 % (ref 4–5.6)
KETONES UR QL: NEGATIVE
LEUKOCYTE EST, POC: NEGATIVE
NITRITE UR-MCNC: NEGATIVE MG/ML
PH UR: 5.5 [PH] (ref 5–8)
PROT UR STRIP-MCNC: NEGATIVE MG/DL
RBC # UR STRIP: ABNORMAL /UL
SP GR UR: 1.01 (ref 1–1.03)
UROBILINOGEN UR QL: NORMAL

## 2017-08-23 PROCEDURE — 99214 OFFICE O/P EST MOD 30 MIN: CPT | Performed by: NURSE PRACTITIONER

## 2017-08-23 PROCEDURE — 81003 URINALYSIS AUTO W/O SCOPE: CPT | Performed by: NURSE PRACTITIONER

## 2017-08-23 PROCEDURE — 96372 THER/PROPH/DIAG INJ SC/IM: CPT | Performed by: NURSE PRACTITIONER

## 2017-08-23 RX ORDER — METFORMIN HYDROCHLORIDE 500 MG/1
1000 TABLET, EXTENDED RELEASE ORAL 2 TIMES DAILY WITH MEALS
Qty: 60 TABLET | Refills: 5 | Status: SHIPPED | OUTPATIENT
Start: 2017-08-23 | End: 2018-04-09

## 2017-08-23 RX ORDER — CEFDINIR 300 MG/1
300 CAPSULE ORAL 2 TIMES DAILY
Qty: 20 CAPSULE | Refills: 0 | Status: SHIPPED | OUTPATIENT
Start: 2017-08-23 | End: 2017-09-01

## 2017-08-23 RX ORDER — CEFTRIAXONE 1 G/1
1 INJECTION, POWDER, FOR SOLUTION INTRAMUSCULAR; INTRAVENOUS ONCE
Status: COMPLETED | OUTPATIENT
Start: 2017-08-23 | End: 2017-08-23

## 2017-08-23 RX ADMIN — CEFTRIAXONE 1 G: 1 INJECTION, POWDER, FOR SOLUTION INTRAMUSCULAR; INTRAVENOUS at 14:14

## 2017-08-23 NOTE — PATIENT INSTRUCTIONS
Pyelonephritis, Adult  Pyelonephritis is a kidney infection. The kidneys are the organs that filter a person's blood and move waste out of the bloodstream and into the urine. Urine passes from the kidneys, through the ureters, and into the bladder. There are two main types of pyelonephritis:  · Infections that come on quickly without any warning (acute pyelonephritis).  · Infections that last for a long period of time (chronic pyelonephritis).  In most cases, the infection clears up with treatment and does not cause further problems. More severe infections or chronic infections can sometimes spread to the bloodstream or lead to other problems with the kidneys.  CAUSES  This condition is usually caused by:  · Bacteria traveling from the bladder to the kidney through infected urine. The urine in the bladder can become infected with bacteria from:    Bladder infection (cystitis).    Inflammation of the prostate gland (prostatitis).    Sexual intercourse, in females.  · Bacteria traveling from the bloodstream to the kidney.  RISK FACTORS  This condition is more likely to develop in:  · Pregnant women.  · Older people.  · People who have diabetes.  · People who have kidney stones or bladder stones.  · People who have other abnormalities of the kidney or ureter.  · People who have a catheter placed in the bladder.  · People who have cancer.  · People who are sexually active.  · Women who use spermicides.  · People who have had a prior urinary tract infection.  SYMPTOMS  Symptoms of this condition include:  · Frequent urination.  · Strong or persistent urge to urinate.  · Burning or stinging when urinating.  · Abdominal pain.  · Back pain.  · Pain in the side or flank area.  · Fever.  · Chills.  · Blood in the urine, or dark urine.  · Nausea.  · Vomiting.  DIAGNOSIS  This condition may be diagnosed based on:  · Medical history and physical exam.  · Urine tests.  · Blood tests.  You may also have imaging tests of the  kidneys, such as an ultrasound or CT scan.  TREATMENT  Treatment for this condition may depend on the severity of the infection.  · If the infection is mild and is found early, you may be treated with antibiotic medicines taken by mouth. You will need to drink fluids to remain hydrated.  · If the infection is more severe, you may need to stay in the hospital and receive antibiotics given directly into a vein through an IV tube. You may also need to receive fluids through an IV tube if you are not able to remain hydrated. After your hospital stay, you may need to take oral antibiotics for a period of time.  Other treatments may be required, depending on the cause of the infection.  HOME CARE INSTRUCTIONS  Medicines  · Take over-the-counter and prescription medicines only as told by your health care provider.  · If you were prescribed an antibiotic medicine, take it as told by your health care provider. Do not stop taking the antibiotic even if you start to feel better.  General Instructions  · Drink enough fluid to keep your urine clear or pale yellow.  · Avoid caffeine, tea, and carbonated beverages. They tend to irritate the bladder.  · Urinate often. Avoid holding in urine for long periods of time.  · Urinate before and after sex.  · After a bowel movement, women should cleanse from front to back. Use each tissue only once.  · Keep all follow-up visits as told by your health care provider. This is important.  SEEK MEDICAL CARE IF:  · Your symptoms do not get better after 2 days of treatment.  · Your symptoms get worse.  · You have a fever.  SEEK IMMEDIATE MEDICAL CARE IF:  · You are unable to take your antibiotics or fluids.  · You have shaking chills.  · You vomit.  · You have severe flank or back pain.  · You have extreme weakness or fainting.     This information is not intended to replace advice given to you by your health care provider. Make sure you discuss any questions you have with your health care  provider.     Document Released: 12/18/2006 Document Revised: 09/07/2016 Document Reviewed: 04/11/2016  [employer] Interactive Patient Education ©2017 [employer]  Steps to Quit Smoking   Smoking tobacco can be harmful to your health and can affect almost every organ in your body. Smoking puts you, and those around you, at risk for developing many serious chronic diseases. Quitting smoking is difficult, but it is one of the best things that you can do for your health. It is never too late to quit.  WHAT ARE THE BENEFITS OF QUITTING SMOKING?  When you quit smoking, you lower your risk of developing serious diseases and conditions, such as:  · Lung cancer or lung disease, such as COPD.  · Heart disease.  · Stroke.  · Heart attack.  · Infertility.  · Osteoporosis and bone fractures.  Additionally, symptoms such as coughing, wheezing, and shortness of breath may get better when you quit. You may also find that you get sick less often because your body is stronger at fighting off colds and infections. If you are pregnant, quitting smoking can help to reduce your chances of having a baby of low birth weight.  HOW DO I GET READY TO QUIT?  When you decide to quit smoking, create a plan to make sure that you are successful. Before you quit:  · Pick a date to quit. Set a date within the next two weeks to give you time to prepare.  · Write down the reasons why you are quitting. Keep this list in places where you will see it often, such as on your bathroom mirror or in your car or wallet.  · Identify the people, places, things, and activities that make you want to smoke (triggers) and avoid them. Make sure to take these actions:    Throw away all cigarettes at home, at work, and in your car.    Throw away smoking accessories, such as ashtrays and lighters.    Clean your car and make sure to empty the ashtray.    Clean your home, including curtains and carpets.  · Tell your family, friends, and coworkers that you are quitting.  "Support from your loved ones can make quitting easier.  · Talk with your health care provider about your options for quitting smoking.  · Find out what treatment options are covered by your health insurance.  WHAT STRATEGIES CAN I USE TO QUIT SMOKING?   Talk with your healthcare provider about different strategies to quit smoking. Some strategies include:  · Quitting smoking altogether instead of gradually lessening how much you smoke over a period of time. Research shows that quitting \"cold turkey\" is more successful than gradually quitting.  · Attending in-person counseling to help you build problem-solving skills. You are more likely to have success in quitting if you attend several counseling sessions. Even short sessions of 10 minutes can be effective.  · Finding resources and support systems that can help you to quit smoking and remain smoke-free after you quit. These resources are most helpful when you use them often. They can include:    Online chats with a counselor.    Telephone quitlines.    Printed self-help materials.    Support groups or group counseling.    Text messaging programs.    Mobile phone applications.  · Taking medicines to help you quit smoking. (If you are pregnant or breastfeeding, talk with your health care provider first.) Some medicines contain nicotine and some do not. Both types of medicines help with cravings, but the medicines that include nicotine help to relieve withdrawal symptoms. Your health care provider may recommend:    Nicotine patches, gum, or lozenges.    Nicotine inhalers or sprays.    Non-nicotine medicine that is taken by mouth.  Talk with your health care provider about combining strategies, such as taking medicines while you are also receiving in-person counseling. Using these two strategies together makes you more likely to succeed in quitting than if you used either strategy on its own.  If you are pregnant or breastfeeding, talk with your health care provider " about finding counseling or other support strategies to quit smoking. Do not take medicine to help you quit smoking unless told to do so by your health care provider.  WHAT THINGS CAN I DO TO MAKE IT EASIER TO QUIT?  Quitting smoking might feel overwhelming at first, but there is a lot that you can do to make it easier. Take these important actions:  · Reach out to your family and friends and ask that they support and encourage you during this time. Call telephone quitlines, reach out to support groups, or work with a counselor for support.  · Ask people who smoke to avoid smoking around you.  · Avoid places that trigger you to smoke, such as bars, parties, or smoke-break areas at work.  · Spend time around people who do not smoke.  · Lessen stress in your life, because stress can be a smoking trigger for some people. To lessen stress, try:    Exercising regularly.    Deep-breathing exercises.    Yoga.    Meditating.    Performing a body scan. This involves closing your eyes, scanning your body from head to toe, and noticing which parts of your body are particularly tense. Purposefully relax the muscles in those areas.  · Download or purchase mobile phone or tablet apps (applications) that can help you stick to your quit plan by providing reminders, tips, and encouragement. There are many free apps, such as QuitGuide from the CDC (Centers for Disease Control and Prevention). You can find other support for quitting smoking (smoking cessation) through smokefree.gov and other websites.  HOW WILL I FEEL WHEN I QUIT SMOKING?  Within the first 24 hours of quitting smoking, you may start to feel some withdrawal symptoms. These symptoms are usually most noticeable 2-3 days after quitting, but they usually do not last beyond 2-3 weeks. Changes or symptoms that you might experience include:  · Mood swings.  · Restlessness, anxiety, or irritation.  · Difficulty concentrating.  · Dizziness.  · Strong cravings for sugary foods  in addition to nicotine.  · Mild weight gain.  · Constipation.  · Nausea.  · Coughing or a sore throat.  · Changes in how your medicines work in your body.  · A depressed mood.  · Difficulty sleeping (insomnia).  After the first 2-3 weeks of quitting, you may start to notice more positive results, such as:  · Improved sense of smell and taste.  · Decreased coughing and sore throat.  · Slower heart rate.  · Lower blood pressure.  · Clearer skin.  · The ability to breathe more easily.  · Fewer sick days.  Quitting smoking is very challenging for most people. Do not get discouraged if you are not successful the first time. Some people need to make many attempts to quit before they achieve long-term success. Do your best to stick to your quit plan, and talk with your health care provider if you have any questions or concerns.     This information is not intended to replace advice given to you by your health care provider. Make sure you discuss any questions you have with your health care provider.     Document Released: 12/12/2002 Document Revised: 05/03/2016 Document Reviewed: 05/03/2016  [employer] Interactive Patient Education ©2017 [employer]

## 2017-08-23 NOTE — PROGRESS NOTES
"Chief Complaint   Patient presents with   • Pyelonephritis     f/u from previous visit       Subjective   Jeanie Jones is a 42 y.o. female presents to the office for re-evaluation of UTI and review of lab results.    PMH  Arthralgia-   Managed with PRN tylenol and IBU    Anxiety:  Managed well with doxepin and PRN hydroxyzine    GERD:  Well controlled with pepcid and PRN phenergan    CMH:  Pyelonephritis: currently being treated with macrobid and rocephin injections. Pyridium for bladder spasms PRN    HPI   She states she feels \"worse today\". She states, \"I feel like Dilcia been ran over by a truck\".  Urinary symptoms have improved, but back pain remains consistent.  She still complains of reddish colored urine, but has been taking AZO tabs. Associated symptoms include urinary frequency. She states, \"I was up all night peeing\".  She denies dysuria, urinary odor, discharge, dark urine, fever, hematuria, and radiating pain.       Pyelonephritis   This is a recurrent problem. The current episode started more than 1 month ago. The problem occurs constantly. The problem has been unchanged. Associated symptoms include arthralgias and fatigue. Pertinent negatives include no abdominal pain, chest pain, chills, congestion, coughing, fever, headaches, nausea, neck pain, numbness, rash, sore throat or weakness. The symptoms are aggravated by walking, twisting and standing. Treatments tried: azo and antibiotics. The treatment provided no relief.     Family History   Problem Relation Age of Onset   • Rheum arthritis Mother    • Cancer Mother      lung cancer   • Heart disease Mother      Social History     Social History   • Marital status: Single     Spouse name: N/A   • Number of children: 1   • Years of education: N/A     Occupational History   • Not on file.     Social History Main Topics   • Smoking status: Current Some Day Smoker     Types: Electronic Cigarette   • Smokeless tobacco: Never Used      Comment: Does smoke " "occasionally uses ecig.    • Alcohol use No   • Drug use: No   • Sexual activity: No     Other Topics Concern   • Not on file     Social History Narrative       The following portions of the patient's history were reviewed and updated as appropriate: allergies, current medications, past family history, past medical history, past social history, past surgical history and problem list.    Review of Systems   Constitutional: Positive for fatigue. Negative for activity change, chills, fever and unexpected weight change.   HENT: Negative.  Negative for congestion, ear pain, postnasal drip, rhinorrhea, sinus pressure and sore throat.    Eyes: Negative.  Negative for pain and redness.   Respiratory: Negative.  Negative for cough, choking, chest tightness, shortness of breath and wheezing.    Cardiovascular: Negative.  Negative for chest pain, palpitations and leg swelling.   Gastrointestinal: Negative.  Negative for abdominal distention, abdominal pain, constipation, diarrhea, nausea and rectal pain.   Endocrine: Positive for polydipsia and polyuria.   Genitourinary: Positive for frequency and hematuria. Negative for decreased urine volume, difficulty urinating, dysuria, flank pain and urgency.   Musculoskeletal: Positive for arthralgias. Negative for gait problem and neck pain.   Skin: Negative.  Negative for rash and wound.   Allergic/Immunologic: Negative.    Neurological: Negative.  Negative for dizziness, syncope, weakness, light-headedness, numbness and headaches.   Hematological: Negative.    Psychiatric/Behavioral: Negative.  Negative for agitation, behavioral problems, confusion, self-injury, sleep disturbance and suicidal ideas. The patient is not nervous/anxious.      14 Point ROS completed with pertinent positives discussed. All other systems reviewed and are negative.       Objective   Encounter Vitals  /86  Pulse 99  Temp 98.2 °F (36.8 °C) (Tympanic)   Resp 18  Ht 66\" (167.6 cm)  Wt 170 lb (77.1 " kg)  SpO2 94%  Breastfeeding? No  BMI 27.44 kg/m2    Physical Exam   Constitutional: She is oriented to person, place, and time. Vital signs are normal. She appears well-developed and well-nourished.   HENT:   Head: Normocephalic and atraumatic.   Right Ear: Tympanic membrane, external ear and ear canal normal.   Left Ear: Tympanic membrane, external ear and ear canal normal.   Nose: Nose normal.   Mouth/Throat: Uvula is midline, oropharynx is clear and moist and mucous membranes are normal. No posterior oropharyngeal edema or posterior oropharyngeal erythema.   Eyes: Lids are normal. Pupils are equal, round, and reactive to light.   Neck: Trachea normal and normal range of motion. Neck supple. No thyroid mass present.   Cardiovascular: Normal rate, regular rhythm, S1 normal, S2 normal, normal heart sounds and intact distal pulses.  Exam reveals no gallop and no friction rub.    No murmur heard.  Pulmonary/Chest: Effort normal and breath sounds normal. No respiratory distress. She has no wheezes. She has no rales.   Abdominal: Soft. Normal appearance and bowel sounds are normal. She exhibits no mass. There is CVA tenderness. There is no rebound and no guarding.   Musculoskeletal: Normal range of motion.   Lymphadenopathy:     She has no cervical adenopathy.   Neurological: She is alert and oriented to person, place, and time. She has normal strength. No cranial nerve deficit or sensory deficit. Gait normal.   Skin: Skin is warm and dry. No rash noted.   Psychiatric: She has a normal mood and affect. Her speech is normal and behavior is normal. Judgment and thought content normal. Cognition and memory are normal.   Nursing note and vitals reviewed.      Pertinent Labs  Office Visit on 08/23/2017   Component Date Value Ref Range Status   • Color 08/23/2017 Yellow  Yellow, Straw, Dark Yellow, Maureen Final   • Clarity, UA 08/23/2017 Clear  Clear Final   • Glucose, UA 08/23/2017 Negative  Negative, 1000 mg/dL (3+)  mg/dL Final   • Bilirubin 08/23/2017 Negative  Negative Final   • Ketones, UA 08/23/2017 Negative  Negative Final   • Specific Gravity  08/23/2017 1.015  1.005 - 1.030 Final   • Blood, UA 08/23/2017 3+* Negative Final   • pH, Urine 08/23/2017 5.5  5.0 - 8.0 Final   • Protein, POC 08/23/2017 Negative  Negative mg/dL Final   • Urobilinogen, UA 08/23/2017 Normal  Normal Final   • Leukocytes 08/23/2017 Negative  Negative Final   • Nitrite, UA 08/23/2017 Negative  Negative Final   Office Visit on 08/18/2017   Component Date Value Ref Range Status   • Color 08/18/2017 Yellow  Yellow, Straw, Dark Yellow, Maureen Final   • Clarity, UA 08/18/2017 Clear  Clear Final   • Glucose, UA 08/18/2017 3+* Negative, 1000 mg/dL (3+) mg/dL Final   • Bilirubin 08/18/2017 Negative  Negative Final   • Ketones, UA 08/18/2017 Negative  Negative Final   • Specific Gravity  08/18/2017 1.025  1.005 - 1.030 Final   • Blood, UA 08/18/2017 Trace* Negative Final   • pH, Urine 08/18/2017 6.0  5.0 - 8.0 Final   • Protein, POC 08/18/2017 Negative  Negative mg/dL Final   • Urobilinogen, UA 08/18/2017 Normal  Normal Final   • Leukocytes 08/18/2017 Negative  Negative Final   • Nitrite, UA 08/18/2017 Positive* Negative Final   • Glucose 08/22/2017 113* 70 - 100 mg/dL Final   • BUN 08/22/2017 18  8 - 25 mg/dL Final   • Creatinine 08/22/2017 0.80  0.40 - 1.30 mg/dL Final   • Sodium 08/22/2017 138  134 - 146 mmol/L Final   • Potassium 08/22/2017 3.5  3.4 - 5.4 mmol/L Final   • Chloride 08/22/2017 97* 100 - 112 mmol/L Final   • CO2 08/22/2017 26.0  20.0 - 32.0 mmol/L Final   • Calcium 08/22/2017 9.2  8.4 - 10.8 mg/dL Final   • Total Protein 08/22/2017 7.2  6.7 - 8.2 g/dL Final   • Albumin 08/22/2017 3.90  3.20 - 5.50 g/dL Final   • ALT (SGPT) 08/22/2017 35  10 - 60 U/L Final   • AST (SGOT) 08/22/2017 24  10 - 60 U/L Final   • Alkaline Phosphatase 08/22/2017 43  15 - 121 U/L Final   • Total Bilirubin 08/22/2017 0.4  0.2 - 1.0 mg/dL Final   • eGFR Non African Amer  08/22/2017 79  55 - 135 mL/min/1.73 Final   • eGFR   Amer 08/22/2017 96  58 - 135 mL/min/1.73 Final   • Globulin 08/22/2017 3.3  2.5 - 4.6 gm/dL Final   • A/G Ratio 08/22/2017 1.2  1.0 - 3.0 g/dL Final   • BUN/Creatinine Ratio 08/22/2017 22.5  7.0 - 25.0 Final   • Anion Gap 08/22/2017 15.0  5.0 - 15.0 mmol/L Final   • Hemoglobin A1C 08/22/2017 7.3* 4 - 5.6 % Final   • Urine Culture 08/18/2017 Mixed Culture   Final   • WBC 08/22/2017 23.36* 3.20 - 9.80 10*3/mm3 Final   • RBC 08/22/2017 4.60  3.77 - 5.16 10*6/mm3 Final   • Hemoglobin 08/22/2017 14.2  12.0 - 15.5 g/dL Final   • Hematocrit 08/22/2017 42.5  35.0 - 45.0 % Final   • MCV 08/22/2017 92.4  80.0 - 98.0 fL Final   • MCH 08/22/2017 30.9  26.5 - 34.0 pg Final   • MCHC 08/22/2017 33.4  31.4 - 36.0 g/dL Final   • RDW 08/22/2017 12.7  11.5 - 14.5 % Final   • RDW-SD 08/22/2017 42.7  36.4 - 46.3 fl Final   • MPV 08/22/2017 8.6  8.0 - 12.0 fL Final   • Platelets 08/22/2017 427  150 - 450 10*3/mm3 Final   • Neutrophil % 08/22/2017 52.0  37.0 - 80.0 % Final   • Lymphocyte % 08/22/2017 41.0  10.0 - 50.0 % Final   • Monocyte % 08/22/2017 4.0  0.0 - 12.0 % Final   • Eosinophil % 08/22/2017 3.0  0.0 - 7.0 % Final   • Neutrophils Absolute 08/22/2017 12.15* 2.00 - 8.60 10*3/mm3 Final   • Lymphocytes Absolute 08/22/2017 9.58* 0.60 - 4.20 10*3/mm3 Final   • Monocytes Absolute 08/22/2017 0.93* 0.00 - 0.90 10*3/mm3 Final   • Eosinophils Absolute 08/22/2017 0.70  0.00 - 0.70 10*3/mm3 Final   • RBC Morphology 08/22/2017 Normal  Normal Final   • WBC Morphology 08/22/2017 Normal  Normal Final   • Platelet Estimate 08/22/2017 Adequate  Normal Final     Labs have been independently reviewed    Key Imaging/Tracings/POC Testing    Assessment and Medications  Problems Addressed this Visit        Endocrine    Type 2 diabetes mellitus without complication, without long-term current use of insulin - Primary    Relevant Medications    metFORMIN ER (GLUCOPHAGE-XR) 500 MG 24 hr tablet     Other Relevant Orders    Hemoglobin A1c    Comprehensive Metabolic Panel    Hemoglobin A1c    LDL Cholesterol, Direct    Microalbumin / Creatinine Urine Ratio    CBC & Differential    TSH    Vitamin B12    Basic Metabolic Panel      Other Visit Diagnoses     Urinary tract infection, site unspecified        Relevant Medications    cefTRIAXone (ROCEPHIN) injection 1 g (Start on 8/23/2017  2:30 PM)    cefdinir (OMNICEF) 300 MG capsule    Other Relevant Orders    POCT urinalysis dipstick, automated (Completed)    CBC & Differential    Urinalysis w/ Microscopic if Indicated        Side effects of ordered medications discussed with patient.     Plan/Additional Notes/Instructions  Plan   1. Can not completely rule out Kidney stone- however patient verbally denies symptoms of kidney stone. Will continue to monitor given patient has had several kidney stones in the past.   2. Will consider CT abd with stone protocol if symptoms continue through Thurs 8/24/2017. Patient to call and report symptoms on thurs and Friday.  3. Start omnicef tomorrow, BID- for broader coverage  4. Rocephin injection today given patient remains symptomatic  5. Repeat labs on Friday to reassess elevated WBC and glucosuria  6. Start metformin tomorrow  7. 3 month spot check on BG and A1c-   8. F/u in 6 mos with full labs  9. Advised patient to go directly to ER if symptoms worsen, especially to include uncontrolled or worsening pain, fever, confusion, chest pain, SOA  10. Continue to increase PO water intake    Follow-Up  Return in about 6 months (around 2/23/2018), or if symptoms worsen or fail to improve, for After ordered studies are completed, With full labs.    Patient/caregiver verbalizes understanding of all orders and instructions in this plan of care.       This document has been electronically signed by LEILANI Gallagher on August 23, 2017 2:13 PM

## 2017-09-01 ENCOUNTER — OFFICE VISIT (OUTPATIENT)
Dept: FAMILY MEDICINE CLINIC | Facility: CLINIC | Age: 42
End: 2017-09-01

## 2017-09-01 VITALS
DIASTOLIC BLOOD PRESSURE: 60 MMHG | TEMPERATURE: 97.9 F | HEIGHT: 66 IN | SYSTOLIC BLOOD PRESSURE: 110 MMHG | BODY MASS INDEX: 27.64 KG/M2 | WEIGHT: 172 LBS | HEART RATE: 136 BPM

## 2017-09-01 DIAGNOSIS — J02.9 SORE THROAT: ICD-10-CM

## 2017-09-01 DIAGNOSIS — R06.2 WHEEZING: ICD-10-CM

## 2017-09-01 DIAGNOSIS — J01.10 ACUTE FRONTAL SINUSITIS, RECURRENCE NOT SPECIFIED: ICD-10-CM

## 2017-09-01 DIAGNOSIS — R05.9 COUGH: ICD-10-CM

## 2017-09-01 DIAGNOSIS — R51.9 NONINTRACTABLE HEADACHE, UNSPECIFIED CHRONICITY PATTERN, UNSPECIFIED HEADACHE TYPE: Primary | ICD-10-CM

## 2017-09-01 LAB
EXPIRATION DATE: NORMAL
EXPIRATION DATE: NORMAL
FLUAV AG NPH QL: NORMAL
FLUBV AG NPH QL: NORMAL
INTERNAL CONTROL: NORMAL
INTERNAL CONTROL: NORMAL
Lab: NORMAL
Lab: NORMAL
S PYO AG THROAT QL: NEGATIVE

## 2017-09-01 PROCEDURE — 99213 OFFICE O/P EST LOW 20 MIN: CPT | Performed by: NURSE PRACTITIONER

## 2017-09-01 PROCEDURE — 96372 THER/PROPH/DIAG INJ SC/IM: CPT | Performed by: NURSE PRACTITIONER

## 2017-09-01 PROCEDURE — 87880 STREP A ASSAY W/OPTIC: CPT | Performed by: NURSE PRACTITIONER

## 2017-09-01 PROCEDURE — 87804 INFLUENZA ASSAY W/OPTIC: CPT | Performed by: NURSE PRACTITIONER

## 2017-09-01 RX ORDER — PREDNISONE 20 MG/1
20 TABLET ORAL 2 TIMES DAILY
Qty: 14 TABLET | Refills: 0 | Status: SHIPPED | OUTPATIENT
Start: 2017-09-01 | End: 2017-09-19

## 2017-09-01 RX ORDER — CEFTRIAXONE 1 G/1
1 INJECTION, POWDER, FOR SOLUTION INTRAMUSCULAR; INTRAVENOUS ONCE
Status: COMPLETED | OUTPATIENT
Start: 2017-09-01 | End: 2017-09-01

## 2017-09-01 RX ORDER — AMOXICILLIN AND CLAVULANATE POTASSIUM 875; 125 MG/1; MG/1
1 TABLET, FILM COATED ORAL 2 TIMES DAILY
Qty: 20 TABLET | Refills: 0 | Status: SHIPPED | OUTPATIENT
Start: 2017-09-01 | End: 2017-09-19

## 2017-09-01 RX ORDER — FLUCONAZOLE 150 MG/1
150 TABLET ORAL ONCE
Qty: 3 TABLET | Refills: 0 | Status: SHIPPED | OUTPATIENT
Start: 2017-09-01 | End: 2017-09-01

## 2017-09-01 RX ORDER — PROMETHAZINE HYDROCHLORIDE AND PHENYLEPHRINE HYDROCHLORIDE 6.25; 5 MG/5ML; MG/5ML
5 SYRUP ORAL EVERY 4 HOURS PRN
Qty: 120 ML | Refills: 1 | Status: SHIPPED | OUTPATIENT
Start: 2017-09-01 | End: 2017-09-19

## 2017-09-01 RX ADMIN — CEFTRIAXONE 1 G: 1 INJECTION, POWDER, FOR SOLUTION INTRAMUSCULAR; INTRAVENOUS at 14:03

## 2017-09-01 NOTE — PROGRESS NOTES
Chief Complaint   Patient presents with   • Headache     x 3 days   • Generalized Body Aches       Subjective   Jeanie Jones is a 42 y.o. female presents to the office for evaluation of URI symptoms, headache, and body aches x 3 days.    PMH  T2DM- recently diagnosed, treated with metformin    GERD:  Managed with pepcid    Depression:  Managed with doxipn    HPI   Patient presents with acute c/o sinus pain, headache, cream nasal drainage, productive cough with cream sputum, and body aches. She has been taking otc cold and flu medicine with mild relief of symptoms. She dneies known sick contacts. She denies fever, N/V/D.     Sinusitis   This is a new problem. The current episode started in the past 7 days. There has been no fever. The fever has been present for less than 1 day. Her pain is at a severity of 8/10. The pain is moderate. Associated symptoms include congestion, coughing, headaches, sinus pressure, a sore throat and swollen glands. Pertinent negatives include no chills, ear pain, neck pain or shortness of breath. Treatments tried: otc sinus medication. The treatment provided mild relief.     Family History   Problem Relation Age of Onset   • Rheum arthritis Mother    • Cancer Mother      lung cancer   • Heart disease Mother      Social History     Social History   • Marital status: Single     Spouse name: N/A   • Number of children: 1   • Years of education: N/A     Occupational History   • Not on file.     Social History Main Topics   • Smoking status: Current Some Day Smoker     Types: Electronic Cigarette   • Smokeless tobacco: Never Used      Comment: Does smoke occasionally uses ecig.    • Alcohol use No   • Drug use: No   • Sexual activity: No     Other Topics Concern   • Not on file     Social History Narrative       The following portions of the patient's history were reviewed and updated as appropriate: allergies, current medications, past family history, past medical history, past social  "history, past surgical history and problem list.    Review of Systems   Constitutional: Negative.  Negative for activity change, chills, fatigue, fever and unexpected weight change.   HENT: Positive for congestion, postnasal drip, sinus pressure and sore throat. Negative for ear pain, rhinorrhea and trouble swallowing.    Eyes: Negative.  Negative for pain and redness.   Respiratory: Positive for cough. Negative for choking, chest tightness, shortness of breath and wheezing.    Cardiovascular: Negative.  Negative for chest pain, palpitations and leg swelling.   Gastrointestinal: Negative.  Negative for abdominal distention, abdominal pain, constipation, diarrhea, nausea and rectal pain.   Endocrine: Negative.    Genitourinary: Negative.  Negative for decreased urine volume, difficulty urinating, dysuria, flank pain, hematuria and urgency.   Musculoskeletal: Negative.  Negative for gait problem and neck pain.   Skin: Negative.  Negative for rash and wound.   Allergic/Immunologic: Negative.    Neurological: Positive for headaches. Negative for dizziness, syncope, weakness, light-headedness and numbness.   Hematological: Negative.    Psychiatric/Behavioral: Negative.  Negative for agitation, behavioral problems, confusion, self-injury, sleep disturbance and suicidal ideas. The patient is not nervous/anxious.      14 Point ROS completed with pertinent positives discussed. All other systems reviewed and are negative.       Objective   Encounter Vitals  /60  Pulse (!) 136  Temp 97.9 °F (36.6 °C)  Ht 66\" (167.6 cm)  Wt 172 lb (78 kg)  BMI 27.76 kg/m2    Physical Exam   Constitutional: She is oriented to person, place, and time. Vital signs are normal. She appears well-developed and well-nourished. She has a sickly appearance.   HENT:   Head: Normocephalic and atraumatic.   Right Ear: Tympanic membrane, external ear and ear canal normal.   Left Ear: Tympanic membrane, external ear and ear canal normal.   Nose: " Mucosal edema present. Right sinus exhibits maxillary sinus tenderness and frontal sinus tenderness. Left sinus exhibits maxillary sinus tenderness and frontal sinus tenderness.   Mouth/Throat: Uvula is midline and mucous membranes are normal. Posterior oropharyngeal edema and posterior oropharyngeal erythema present. No tonsillar exudate.   Eyes: Lids are normal. Pupils are equal, round, and reactive to light.   Neck: Trachea normal and normal range of motion. Neck supple. No thyroid mass present.   Cardiovascular: Normal rate, regular rhythm, S1 normal, S2 normal, normal heart sounds and intact distal pulses.  Exam reveals no gallop and no friction rub.    No murmur heard.  Pulmonary/Chest: Effort normal. No respiratory distress. She has wheezes in the right upper field, the right lower field, the left upper field and the left lower field. She has no rales.   Abdominal: Soft. Normal appearance and bowel sounds are normal. She exhibits no mass. There is no rebound and no guarding.   Musculoskeletal: Normal range of motion.   Lymphadenopathy:     She has cervical adenopathy.        Right cervical: Superficial cervical adenopathy present.        Left cervical: Superficial cervical adenopathy present.   Neurological: She is alert and oriented to person, place, and time. She has normal strength. No cranial nerve deficit or sensory deficit. Gait normal.   Skin: Skin is warm and dry. No rash noted.   Psychiatric: She has a normal mood and affect. Her speech is normal and behavior is normal. Judgment and thought content normal. Cognition and memory are normal.   Nursing note and vitals reviewed.      Pertinent Labs  Office Visit on 09/01/2017   Component Date Value Ref Range Status   • Rapid Influenza A Ag 09/01/2017 NEG   Final   • Rapid Influenza B Ag 09/01/2017 NEG   Final   • Internal Control 09/01/2017 Passed  Passed Final   • Lot Number 09/01/2017 [id number]   Final   • Expiration Date 09/01/2017 2/2019   Final   • Rapid  Strep A Screen 09/01/2017 Negative  Negative, VALID, INVALID, Not Performed Final   • Internal Control 09/01/2017 Passed  Passed Final   • Lot Number 09/01/2017 [id number]   Final   • Expiration Date 09/01/2017 12/13/2019   Final   Office Visit on 08/23/2017   Component Date Value Ref Range Status   • Color 08/23/2017 Yellow  Yellow, Straw, Dark Yellow, Maureen Final   • Clarity, UA 08/23/2017 Clear  Clear Final   • Glucose, UA 08/23/2017 Negative  Negative, 1000 mg/dL (3+) mg/dL Final   • Bilirubin 08/23/2017 Negative  Negative Final   • Ketones, UA 08/23/2017 Negative  Negative Final   • Specific Gravity  08/23/2017 1.015  1.005 - 1.030 Final   • Blood, UA 08/23/2017 3+* Negative Final   • pH, Urine 08/23/2017 5.5  5.0 - 8.0 Final   • Protein, POC 08/23/2017 Negative  Negative mg/dL Final   • Urobilinogen, UA 08/23/2017 Normal  Normal Final   • Leukocytes 08/23/2017 Negative  Negative Final   • Nitrite, UA 08/23/2017 Negative  Negative Final   Office Visit on 08/18/2017   Component Date Value Ref Range Status   • Color 08/18/2017 Yellow  Yellow, Straw, Dark Yellow, Maureen Final   • Clarity, UA 08/18/2017 Clear  Clear Final   • Glucose, UA 08/18/2017 3+* Negative, 1000 mg/dL (3+) mg/dL Final   • Bilirubin 08/18/2017 Negative  Negative Final   • Ketones, UA 08/18/2017 Negative  Negative Final   • Specific Gravity  08/18/2017 1.025  1.005 - 1.030 Final   • Blood, UA 08/18/2017 Trace* Negative Final   • pH, Urine 08/18/2017 6.0  5.0 - 8.0 Final   • Protein, POC 08/18/2017 Negative  Negative mg/dL Final   • Urobilinogen, UA 08/18/2017 Normal  Normal Final   • Leukocytes 08/18/2017 Negative  Negative Final   • Nitrite, UA 08/18/2017 Positive* Negative Final   • Glucose 08/22/2017 113* 70 - 100 mg/dL Final   • BUN 08/22/2017 18  8 - 25 mg/dL Final   • Creatinine 08/22/2017 0.80  0.40 - 1.30 mg/dL Final   • Sodium 08/22/2017 138  134 - 146 mmol/L Final   • Potassium 08/22/2017 3.5  3.4 - 5.4 mmol/L Final   • Chloride 08/22/2017  97* 100 - 112 mmol/L Final   • CO2 08/22/2017 26.0  20.0 - 32.0 mmol/L Final   • Calcium 08/22/2017 9.2  8.4 - 10.8 mg/dL Final   • Total Protein 08/22/2017 7.2  6.7 - 8.2 g/dL Final   • Albumin 08/22/2017 3.90  3.20 - 5.50 g/dL Final   • ALT (SGPT) 08/22/2017 35  10 - 60 U/L Final   • AST (SGOT) 08/22/2017 24  10 - 60 U/L Final   • Alkaline Phosphatase 08/22/2017 43  15 - 121 U/L Final   • Total Bilirubin 08/22/2017 0.4  0.2 - 1.0 mg/dL Final   • eGFR Non  Amer 08/22/2017 79  55 - 135 mL/min/1.73 Final   • eGFR   Amer 08/22/2017 96  58 - 135 mL/min/1.73 Final   • Globulin 08/22/2017 3.3  2.5 - 4.6 gm/dL Final   • A/G Ratio 08/22/2017 1.2  1.0 - 3.0 g/dL Final   • BUN/Creatinine Ratio 08/22/2017 22.5  7.0 - 25.0 Final   • Anion Gap 08/22/2017 15.0  5.0 - 15.0 mmol/L Final   • Hemoglobin A1C 08/22/2017 7.3* 4 - 5.6 % Final   • Urine Culture 08/18/2017 Mixed Culture   Final   • WBC 08/22/2017 23.36* 3.20 - 9.80 10*3/mm3 Final   • RBC 08/22/2017 4.60  3.77 - 5.16 10*6/mm3 Final   • Hemoglobin 08/22/2017 14.2  12.0 - 15.5 g/dL Final   • Hematocrit 08/22/2017 42.5  35.0 - 45.0 % Final   • MCV 08/22/2017 92.4  80.0 - 98.0 fL Final   • MCH 08/22/2017 30.9  26.5 - 34.0 pg Final   • MCHC 08/22/2017 33.4  31.4 - 36.0 g/dL Final   • RDW 08/22/2017 12.7  11.5 - 14.5 % Final   • RDW-SD 08/22/2017 42.7  36.4 - 46.3 fl Final   • MPV 08/22/2017 8.6  8.0 - 12.0 fL Final   • Platelets 08/22/2017 427  150 - 450 10*3/mm3 Final   • Neutrophil % 08/22/2017 52.0  37.0 - 80.0 % Final   • Lymphocyte % 08/22/2017 41.0  10.0 - 50.0 % Final   • Monocyte % 08/22/2017 4.0  0.0 - 12.0 % Final   • Eosinophil % 08/22/2017 3.0  0.0 - 7.0 % Final   • Neutrophils Absolute 08/22/2017 12.15* 2.00 - 8.60 10*3/mm3 Final   • Lymphocytes Absolute 08/22/2017 9.58* 0.60 - 4.20 10*3/mm3 Final   • Monocytes Absolute 08/22/2017 0.93* 0.00 - 0.90 10*3/mm3 Final   • Eosinophils Absolute 08/22/2017 0.70  0.00 - 0.70 10*3/mm3 Final   • RBC Morphology  08/22/2017 Normal  Normal Final   • WBC Morphology 08/22/2017 Normal  Normal Final   • Platelet Estimate 08/22/2017 Adequate  Normal Final     Labs have been independently reviewed    Key Imaging/Tracings/POC Testing  Lab Results   Component Value Date    RAPFLUA NEG 09/01/2017    RAPFLUB NEG 09/01/2017     Lab Results   Component Value Date    RAPSCRN Negative 09/01/2017     Assessment and Medications  Problems Addressed this Visit     None      Visit Diagnoses     Nonintractable headache, unspecified chronicity pattern, unspecified headache type    -  Primary    Relevant Orders    POC Influenza A / B (Completed)    POC Rapid Strep A (Completed)    Sore throat        Relevant Orders    POC Influenza A / B (Completed)    POC Rapid Strep A (Completed)    Cough        Wheezing        Acute frontal sinusitis, recurrence not specified        Relevant Medications    cefTRIAXone (ROCEPHIN) injection 1 g (Start on 9/1/2017  2:30 PM)        Side effects of ordered medications discussed with patient.     Plan/Additional Notes/Instructions  Plan   1. Rocephin injection today  2. Start augmentin today  3. Antitussive for cough  4. Salt water gargles  5. Nasal saline rinses  6. Prednisone 20 mg PO BId X 3-5 days  7. Prn Tylenol/IBU for fever or aches and pains  8. If you experience respiratory distress, chest pain, or feelings of impending doom, call 911 or have someone drive you to the nearest ER.    Follow-Up  Return if symptoms worsen or fail to improve.    Patient/caregiver verbalizes understanding of all orders and instructions in this plan of care.       This document has been electronically signed by LEILANI Gallagher on September 1, 2017 1:54 PM

## 2017-09-18 RX ORDER — HYDROXYZINE 50 MG/1
TABLET, FILM COATED ORAL
Qty: 60 TABLET | Refills: 3 | Status: SHIPPED | OUTPATIENT
Start: 2017-09-18 | End: 2018-04-06 | Stop reason: SDUPTHER

## 2017-09-18 RX ORDER — FAMOTIDINE 40 MG/1
TABLET, FILM COATED ORAL
Qty: 30 TABLET | Refills: 1 | Status: SHIPPED | OUTPATIENT
Start: 2017-09-18 | End: 2020-03-20

## 2017-09-19 ENCOUNTER — OFFICE VISIT (OUTPATIENT)
Dept: FAMILY MEDICINE CLINIC | Facility: CLINIC | Age: 42
End: 2017-09-19

## 2017-09-19 VITALS
HEART RATE: 103 BPM | OXYGEN SATURATION: 96 % | RESPIRATION RATE: 16 BRPM | SYSTOLIC BLOOD PRESSURE: 118 MMHG | DIASTOLIC BLOOD PRESSURE: 78 MMHG | TEMPERATURE: 98.7 F | HEIGHT: 66 IN | BODY MASS INDEX: 27.97 KG/M2 | WEIGHT: 174 LBS

## 2017-09-19 DIAGNOSIS — E11.9 TYPE 2 DIABETES MELLITUS WITHOUT COMPLICATION, WITHOUT LONG-TERM CURRENT USE OF INSULIN (HCC): ICD-10-CM

## 2017-09-19 DIAGNOSIS — R31.0 HEMATURIA, GROSS: Primary | ICD-10-CM

## 2017-09-19 LAB
BILIRUB BLD-MCNC: NEGATIVE MG/DL
CLARITY, POC: CLEAR
COLOR UR: YELLOW
GLUCOSE UR STRIP-MCNC: NEGATIVE MG/DL
KETONES UR QL: NEGATIVE
LEUKOCYTE EST, POC: NEGATIVE
NITRITE UR-MCNC: NEGATIVE MG/ML
PH UR: 6 [PH] (ref 5–8)
PROT UR STRIP-MCNC: NEGATIVE MG/DL
RBC # UR STRIP: NEGATIVE /UL
SP GR UR: 1.02 (ref 1–1.03)
UROBILINOGEN UR QL: NORMAL

## 2017-09-19 PROCEDURE — 99214 OFFICE O/P EST MOD 30 MIN: CPT | Performed by: FAMILY MEDICINE

## 2017-09-19 PROCEDURE — 81003 URINALYSIS AUTO W/O SCOPE: CPT | Performed by: FAMILY MEDICINE

## 2017-09-19 NOTE — PROGRESS NOTES
Subjective   Jeanie Jones is a 42 y.o. female.   Chief Complaint   Patient presents with   • Blood in Urine       Blood in Urine   This is a recurrent problem. The current episode started 1 to 4 weeks ago. The problem has been waxing and waning since onset. She describes the hematuria as gross hematuria. Her pain is at a severity of 8/10. The pain is severe. She describes her urine color as clear. Associated symptoms include abdominal pain. Pertinent negatives include no chills, dysuria or fever. Her past medical history is significant for kidney stones.        The following portions of the patient's history were reviewed and updated as appropriate: allergies, current medications, past family history, past medical history, past social history, past surgical history and problem list.    Review of Systems   Constitutional: Negative.  Negative for chills and fever.   HENT: Negative.    Eyes: Negative.    Respiratory: Negative.    Cardiovascular: Negative.    Gastrointestinal: Positive for abdominal pain.   Endocrine: Negative.    Genitourinary: Positive for hematuria. Negative for dysuria.   Musculoskeletal: Negative.    Skin: Negative.    Allergic/Immunologic: Negative.    Neurological: Negative.    Hematological: Negative.    Psychiatric/Behavioral: Negative.    All other systems reviewed and are negative.      Objective   Physical Exam   Constitutional: She is oriented to person, place, and time. She appears well-developed and well-nourished.   HENT:   Head: Normocephalic and atraumatic.   Right Ear: External ear normal.   Left Ear: External ear normal.   Nose: Nose normal.   Mouth/Throat: Oropharynx is clear and moist.   Eyes: Conjunctivae and EOM are normal. Pupils are equal, round, and reactive to light.   Neck: Normal range of motion. Neck supple.   Cardiovascular: Normal rate, regular rhythm, normal heart sounds and intact distal pulses.  Exam reveals no gallop and no friction rub.    No murmur  heard.  Pulmonary/Chest: Effort normal and breath sounds normal. She has no wheezes. She has no rales.   Abdominal: Soft. Bowel sounds are normal. She exhibits no mass. There is no tenderness. There is no rebound and no guarding.   Musculoskeletal: Normal range of motion.   Neurological: She is alert and oriented to person, place, and time. She has normal reflexes. No cranial nerve deficit. She exhibits normal muscle tone.   Skin: Skin is warm and dry. No rash noted.   Psychiatric: She has a normal mood and affect. Her behavior is normal. Judgment and thought content normal.   Nursing note and vitals reviewed.  Urinalysis negative    Assessment/Plan   Jeanie was seen today for blood in urine.    Diagnoses and all orders for this visit:    Hematuria, gross  -     POCT urinalysis dipstick, automated  -     US Renal Bilateral; Future    Type 2 diabetes mellitus without complication, without long-term current use of insulin        Continue with current therapies

## 2017-10-05 RX ORDER — PROMETHAZINE HYDROCHLORIDE 25 MG/1
TABLET ORAL
Qty: 60 TABLET | Refills: 1 | Status: SHIPPED | OUTPATIENT
Start: 2017-10-05 | End: 2017-11-21 | Stop reason: SDUPTHER

## 2017-11-21 RX ORDER — PROMETHAZINE HYDROCHLORIDE 25 MG/1
TABLET ORAL
Qty: 60 TABLET | Refills: 1 | Status: SHIPPED | OUTPATIENT
Start: 2017-11-21 | End: 2017-12-22 | Stop reason: SDUPTHER

## 2017-12-18 ENCOUNTER — OFFICE VISIT (OUTPATIENT)
Dept: FAMILY MEDICINE CLINIC | Facility: CLINIC | Age: 42
End: 2017-12-18

## 2017-12-18 VITALS
HEART RATE: 121 BPM | BODY MASS INDEX: 28.77 KG/M2 | DIASTOLIC BLOOD PRESSURE: 78 MMHG | OXYGEN SATURATION: 91 % | SYSTOLIC BLOOD PRESSURE: 122 MMHG | WEIGHT: 179 LBS | TEMPERATURE: 96.8 F | HEIGHT: 66 IN

## 2017-12-18 DIAGNOSIS — R39.15 URINARY URGENCY: ICD-10-CM

## 2017-12-18 DIAGNOSIS — N15.9 KIDNEY INFECTION: ICD-10-CM

## 2017-12-18 DIAGNOSIS — N30.91 HEMORRHAGIC CYSTITIS: Primary | ICD-10-CM

## 2017-12-18 DIAGNOSIS — R31.9 HEMATURIA, UNSPECIFIED TYPE: ICD-10-CM

## 2017-12-18 DIAGNOSIS — R35.0 URINARY FREQUENCY: ICD-10-CM

## 2017-12-18 LAB
BILIRUB BLD-MCNC: ABNORMAL MG/DL
CLARITY, POC: ABNORMAL
COLOR UR: ABNORMAL
GLUCOSE UR STRIP-MCNC: NEGATIVE MG/DL
KETONES UR QL: ABNORMAL
LEUKOCYTE EST, POC: ABNORMAL
NITRITE UR-MCNC: POSITIVE MG/ML
PH UR: 5.5 [PH] (ref 5–8)
PROT UR STRIP-MCNC: ABNORMAL MG/DL
RBC # UR STRIP: ABNORMAL /UL
SP GR UR: 1.02 (ref 1–1.03)
UROBILINOGEN UR QL: ABNORMAL

## 2017-12-18 PROCEDURE — 81003 URINALYSIS AUTO W/O SCOPE: CPT | Performed by: NURSE PRACTITIONER

## 2017-12-18 PROCEDURE — 96372 THER/PROPH/DIAG INJ SC/IM: CPT | Performed by: NURSE PRACTITIONER

## 2017-12-18 PROCEDURE — 87086 URINE CULTURE/COLONY COUNT: CPT | Performed by: NURSE PRACTITIONER

## 2017-12-18 PROCEDURE — 99214 OFFICE O/P EST MOD 30 MIN: CPT | Performed by: NURSE PRACTITIONER

## 2017-12-18 RX ORDER — CEFTRIAXONE 1 G/1
1 INJECTION, POWDER, FOR SOLUTION INTRAMUSCULAR; INTRAVENOUS ONCE
Status: COMPLETED | OUTPATIENT
Start: 2017-12-18 | End: 2017-12-18

## 2017-12-18 RX ORDER — NITROFURANTOIN 25; 75 MG/1; MG/1
100 CAPSULE ORAL 2 TIMES DAILY
Qty: 14 CAPSULE | Refills: 0 | Status: SHIPPED | OUTPATIENT
Start: 2017-12-18 | End: 2017-12-27

## 2017-12-18 RX ADMIN — CEFTRIAXONE 1 G: 1 INJECTION, POWDER, FOR SOLUTION INTRAMUSCULAR; INTRAVENOUS at 14:16

## 2017-12-18 NOTE — PROGRESS NOTES
"Chief Complaint   Patient presents with   • Urinary Tract Infection     x 2 days        Subjective   Jeanie Jones is a 42 y.o. female presents to the office for evaluation of left flank pain and gross hematuria X 2 days.    PMH  T2DM- recently diagnosed, treated with metformin    GERD:  Managed with pepcid    Anxiety:  Managed with atarax- well controlled. Takes PRN and sometimes daily    Suboxone clinic: patient goes to suboxone to prevent relapsing of opioid addiction    HPI   Patient presents with a 2 day history of right flank pain and gross hematuria. She c/o right flank pain that is \"sharp and stabby\". She rates her pain 8/10. She woke up with hematuria this morning and states she \"knew I needed to make an appointment\".     Urinary Tract Infection  Patient complains of frequency, hematuria, hesitancy, nausea, pain in the right flank, suprapubic pressure and urgency. She has had symptoms for 2 days. Patient also complains of back pain. Patient denies cough, fever, headache, rhinitis, sorethroat, stomach ache and vaginal discharge. Patient does have a history of recurrent UTI. Patient does not have a history of pyelonephritis.       HPI  Family History   Problem Relation Age of Onset   • Rheum arthritis Mother    • Cancer Mother      lung cancer   • Heart disease Mother      Social History     Social History   • Marital status: Single     Spouse name: N/A   • Number of children: 1   • Years of education: N/A     Occupational History   • Not on file.     Social History Main Topics   • Smoking status: Current Some Day Smoker     Packs/day: 1.00     Types: Electronic Cigarette, Cigarettes   • Smokeless tobacco: Never Used      Comment: Does smoke occasionally uses ecig.    • Alcohol use No   • Drug use: No   • Sexual activity: No     Other Topics Concern   • Not on file     Social History Narrative       The following portions of the patient's history were reviewed and updated as appropriate: allergies, " "current medications, past family history, past medical history, past social history, past surgical history and problem list.    Review of Systems   Constitutional: Negative.  Negative for activity change, chills, fatigue, fever and unexpected weight change.   HENT: Negative.  Negative for congestion, ear pain, postnasal drip, rhinorrhea, sinus pressure and sore throat.    Eyes: Negative.  Negative for pain and redness.   Respiratory: Negative.  Negative for cough, choking, chest tightness, shortness of breath and wheezing.    Cardiovascular: Negative.  Negative for chest pain, palpitations and leg swelling.   Gastrointestinal: Negative.  Negative for abdominal distention, abdominal pain, constipation, diarrhea, nausea and rectal pain.   Endocrine: Negative.    Genitourinary: Positive for flank pain, frequency, hematuria and urgency. Negative for decreased urine volume, difficulty urinating and dysuria.   Musculoskeletal: Negative for gait problem and neck pain.   Skin: Negative.  Negative for rash and wound.   Allergic/Immunologic: Negative.    Neurological: Negative.  Negative for dizziness, syncope, weakness, light-headedness, numbness and headaches.   Hematological: Negative.    Psychiatric/Behavioral: Negative.  Negative for agitation, behavioral problems, confusion, self-injury, sleep disturbance and suicidal ideas. The patient is not nervous/anxious.      14 Point ROS completed with pertinent positives discussed. All other systems reviewed and are negative.       Objective   Encounter Vitals  /78  Pulse (!) 121  Temp 96.8 °F (36 °C) (Tympanic)   Ht 167.6 cm (66\")  Wt 81.2 kg (179 lb)  SpO2 91%  Breastfeeding? No  BMI 28.89 kg/m2    Physical Exam   Constitutional: She is oriented to person, place, and time. Vital signs are normal. She appears well-developed and well-nourished.   HENT:   Head: Normocephalic and atraumatic.   Right Ear: Tympanic membrane, external ear and ear canal normal.   Left " Ear: Tympanic membrane, external ear and ear canal normal.   Nose: Nose normal.   Mouth/Throat: Uvula is midline, oropharynx is clear and moist and mucous membranes are normal. No posterior oropharyngeal edema or posterior oropharyngeal erythema.   Eyes: Lids are normal. Pupils are equal, round, and reactive to light.   Neck: Trachea normal and normal range of motion. Neck supple. No thyroid mass present.   Cardiovascular: Normal rate, regular rhythm, S1 normal, S2 normal, normal heart sounds and intact distal pulses.  Exam reveals no gallop and no friction rub.    No murmur heard.  Pulmonary/Chest: Effort normal and breath sounds normal. No respiratory distress. She has no wheezes. She has no rales.   Abdominal: Soft. Normal appearance and bowel sounds are normal. She exhibits no mass. There is tenderness in the suprapubic area. There is CVA tenderness. There is no rebound and no guarding.   Musculoskeletal: Normal range of motion.   Lymphadenopathy:     She has no cervical adenopathy.   Neurological: She is alert and oriented to person, place, and time. She has normal strength. No cranial nerve deficit or sensory deficit. Gait normal.   Skin: Skin is warm and dry. No rash noted.   Psychiatric: She has a normal mood and affect. Her speech is normal and behavior is normal. Judgment and thought content normal. Cognition and memory are normal.   Nursing note and vitals reviewed.      Pertinent Labs  Office Visit on 12/18/2017   Component Date Value Ref Range Status   • Color 12/18/2017 Red* Yellow, Straw, Dark Yellow, Maureen Final   • Clarity, UA 12/18/2017 Turbid* Clear Final   • Glucose, UA 12/18/2017 Negative  Negative, 1000 mg/dL (3+) mg/dL Final   • Bilirubin 12/18/2017 Large (3+)* Negative Final   • Ketones, UA 12/18/2017 1+* Negative Final   • Specific Gravity  12/18/2017 1.020  1.005 - 1.030 Final   • Blood, UA 12/18/2017 3+* Negative Final   • pH, Urine 12/18/2017 5.5  5.0 - 8.0 Final   • Protein, POC  12/18/2017 3+* Negative mg/dL Final   • Urobilinogen, UA 12/18/2017 1 E.U./dL * Normal Final   • Leukocytes 12/18/2017 Large (3+)* Negative Final   • Nitrite, UA 12/18/2017 Positive* Negative Final   Office Visit on 09/19/2017   Component Date Value Ref Range Status   • Color 09/19/2017 Yellow  Yellow, Straw, Dark Yellow, Maureen Final   • Clarity, UA 09/19/2017 Clear  Clear Final   • Glucose, UA 09/19/2017 Negative  Negative, 1000 mg/dL (3+) mg/dL Final   • Bilirubin 09/19/2017 Negative  Negative Final   • Ketones, UA 09/19/2017 Negative  Negative Final   • Specific Gravity  09/19/2017 1.020  1.005 - 1.030 Final   • Blood, UA 09/19/2017 Negative  Negative Final   • pH, Urine 09/19/2017 6.0  5.0 - 8.0 Final   • Protein, POC 09/19/2017 Negative  Negative mg/dL Final   • Urobilinogen, UA 09/19/2017 Normal  Normal Final   • Leukocytes 09/19/2017 Negative  Negative Final   • Nitrite, UA 09/19/2017 Negative  Negative Final     Labs have been independently reviewed    Key Imaging/Tracings/POC Testing  See above   Assessment and Medications  Problems Addressed this Visit     None      Visit Diagnoses     Hemorrhagic cystitis    -  Primary    Relevant Medications    cefTRIAXone (ROCEPHIN) injection 1 g (Completed)    nitrofurantoin, macrocrystal-monohydrate, (MACROBID) 100 MG capsule    Kidney infection        Relevant Medications    nitrofurantoin, macrocrystal-monohydrate, (MACROBID) 100 MG capsule    Other Relevant Orders    POCT urinalysis dipstick, automated (Completed)    Urine Culture - Urine, Urine, Clean Catch    Hematuria, unspecified type        Urinary frequency        Urinary urgency            Side effects of ordered medications discussed with patient.     Plan/Additional Notes/Instructions  Plan   1. Will treat for hemorraghic cystitis today  2. macrobid X 1 week-   3. Will send urine culture off today  4. If fever develops, or symptoms worsen, go directly to ER  5. Repeat u/a in 1 week  6. Patient will call if  she needs pyridium    Follow-Up  Return in about 1 week (around 12/25/2017), or if symptoms worsen or fail to improve.    Patient/caregiver verbalizes understanding of all orders and instructions in this plan of care.       This document has been electronically signed by LEILANI Gallagher on December 18, 2017 2:34 PM

## 2017-12-19 ENCOUNTER — TELEPHONE (OUTPATIENT)
Dept: FAMILY MEDICINE CLINIC | Facility: CLINIC | Age: 42
End: 2017-12-19
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
## 2017-12-19 NOTE — TELEPHONE ENCOUNTER
I called the patient this morning to see how she is feeling and to let her know a script for her daughter was sent tot he pharmacy this morning. She thanked me for the call.

## 2017-12-20 ENCOUNTER — TELEPHONE (OUTPATIENT)
Dept: FAMILY MEDICINE CLINIC | Facility: CLINIC | Age: 42
End: 2017-12-20

## 2017-12-20 LAB
BACTERIA SPEC AEROBE CULT: NORMAL
BACTERIA SPEC AEROBE CULT: NORMAL

## 2017-12-22 RX ORDER — PROMETHAZINE HYDROCHLORIDE 25 MG/1
TABLET ORAL
Qty: 60 TABLET | Refills: 1 | Status: SHIPPED | OUTPATIENT
Start: 2017-12-22 | End: 2018-01-29 | Stop reason: SDUPTHER

## 2017-12-27 ENCOUNTER — OFFICE VISIT (OUTPATIENT)
Dept: FAMILY MEDICINE CLINIC | Facility: CLINIC | Age: 42
End: 2017-12-27

## 2017-12-27 VITALS
DIASTOLIC BLOOD PRESSURE: 78 MMHG | OXYGEN SATURATION: 93 % | TEMPERATURE: 96.8 F | BODY MASS INDEX: 29.66 KG/M2 | HEART RATE: 100 BPM | HEIGHT: 66 IN | SYSTOLIC BLOOD PRESSURE: 100 MMHG | WEIGHT: 184.56 LBS

## 2017-12-27 DIAGNOSIS — R31.9 URINARY TRACT INFECTION WITH HEMATURIA, SITE UNSPECIFIED: Primary | ICD-10-CM

## 2017-12-27 DIAGNOSIS — R10.9 FLANK PAIN: ICD-10-CM

## 2017-12-27 DIAGNOSIS — N39.0 URINARY TRACT INFECTION WITH HEMATURIA, SITE UNSPECIFIED: Primary | ICD-10-CM

## 2017-12-27 LAB
BILIRUB BLD-MCNC: ABNORMAL MG/DL
CLARITY, POC: ABNORMAL
COLOR UR: ABNORMAL
GLUCOSE UR STRIP-MCNC: ABNORMAL MG/DL
KETONES UR QL: NEGATIVE
LEUKOCYTE EST, POC: NEGATIVE
NITRITE UR-MCNC: POSITIVE MG/ML
PH UR: 5.5 [PH] (ref 5–8)
PROT UR STRIP-MCNC: ABNORMAL MG/DL
RBC # UR STRIP: ABNORMAL /UL
SP GR UR: 1.02 (ref 1–1.03)
UROBILINOGEN UR QL: ABNORMAL

## 2017-12-27 PROCEDURE — 96372 THER/PROPH/DIAG INJ SC/IM: CPT | Performed by: NURSE PRACTITIONER

## 2017-12-27 PROCEDURE — 87086 URINE CULTURE/COLONY COUNT: CPT | Performed by: NURSE PRACTITIONER

## 2017-12-27 PROCEDURE — 81002 URINALYSIS NONAUTO W/O SCOPE: CPT | Performed by: NURSE PRACTITIONER

## 2017-12-27 PROCEDURE — 99214 OFFICE O/P EST MOD 30 MIN: CPT | Performed by: NURSE PRACTITIONER

## 2017-12-27 RX ORDER — CEFTRIAXONE 1 G/1
1 INJECTION, POWDER, FOR SOLUTION INTRAMUSCULAR; INTRAVENOUS ONCE
Status: COMPLETED | OUTPATIENT
Start: 2017-12-27 | End: 2017-12-27

## 2017-12-27 RX ORDER — BUPRENORPHINE HYDROCHLORIDE AND NALOXONE HYDROCHLORIDE DIHYDRATE 8; 2 MG/1; MG/1
3 TABLET SUBLINGUAL DAILY
Refills: 0 | COMMUNITY
Start: 2017-12-20 | End: 2021-03-09

## 2017-12-27 RX ORDER — CEFDINIR 300 MG/1
300 CAPSULE ORAL 2 TIMES DAILY
Qty: 20 CAPSULE | Refills: 0 | Status: SHIPPED | OUTPATIENT
Start: 2017-12-27 | End: 2018-01-06

## 2017-12-27 RX ADMIN — CEFTRIAXONE 1 G: 1 INJECTION, POWDER, FOR SOLUTION INTRAMUSCULAR; INTRAVENOUS at 10:51

## 2017-12-27 NOTE — PROGRESS NOTES
Chief Complaint   Patient presents with   • Pyelonephritis     follow up/still not feeling well   • Back Pain     x1week       Subjective   Jeanie Jones is a 42 y.o. female presents to the office for re-evaluation of hemorrhagic cystitis.    PMH  T2DM- recently diagnosed, treated with metformin    GERD:  Managed with pepcid    Anxiety:  Managed with atarax- well controlled. Takes PRN and sometimes daily    Suboxone clinic: patient goes to suboxone to prevent relapsing of opioid addiction    HPI   Patient presents for 1 week f/u for hemorrhagic cystitis.  She continues to c/o hematuria, body aches and left flank pain. Symptoms have improved since her last office visit. She is taking otc AZO tabs. No c/o dysuria, urinary frequency and urgency.    Urinary Tract Infection    This is a recurrent problem. The current episode started 1 to 4 weeks ago. The problem has been gradually improving. Associated symptoms include flank pain, hematuria and nausea. Pertinent negatives include no chills, frequency or urgency. The treatment provided mild relief. Her past medical history is significant for recurrent UTIs.     Family History   Problem Relation Age of Onset   • Rheum arthritis Mother    • Cancer Mother      lung cancer   • Heart disease Mother      Social History     Social History   • Marital status: Single     Spouse name: N/A   • Number of children: 1   • Years of education: N/A     Occupational History   • Not on file.     Social History Main Topics   • Smoking status: Current Some Day Smoker     Packs/day: 1.00     Types: Electronic Cigarette, Cigarettes   • Smokeless tobacco: Never Used      Comment: Does smoke occasionally uses ecig.    • Alcohol use No   • Drug use: No   • Sexual activity: No     Other Topics Concern   • Not on file     Social History Narrative       The following portions of the patient's history were reviewed and updated as appropriate: allergies, current medications, past family history,  "past medical history, past social history, past surgical history and problem list.    Review of Systems   Constitutional: Negative.  Negative for activity change, chills, fatigue, fever and unexpected weight change.   HENT: Negative.  Negative for congestion, ear pain, postnasal drip, rhinorrhea, sinus pressure and sore throat.    Eyes: Negative.  Negative for pain and redness.   Respiratory: Negative.  Negative for cough, choking, chest tightness, shortness of breath and wheezing.    Cardiovascular: Negative.  Negative for chest pain, palpitations and leg swelling.   Gastrointestinal: Positive for nausea. Negative for abdominal distention, abdominal pain, constipation, diarrhea and rectal pain.   Endocrine: Negative.    Genitourinary: Positive for flank pain and hematuria. Negative for decreased urine volume, difficulty urinating, dysuria, frequency and urgency.   Musculoskeletal: Negative for gait problem and neck pain.   Skin: Negative.  Negative for rash and wound.   Allergic/Immunologic: Negative.    Neurological: Negative.  Negative for dizziness, syncope, weakness, light-headedness, numbness and headaches.   Hematological: Negative.    Psychiatric/Behavioral: Negative.  Negative for agitation, behavioral problems, confusion, self-injury, sleep disturbance and suicidal ideas. The patient is not nervous/anxious.      14 Point ROS completed with pertinent positives discussed. All other systems reviewed and are negative.       Objective   Encounter Vitals  /78  Pulse 100  Temp 96.8 °F (36 °C) (Tympanic)   Ht 167.6 cm (66\")  Wt 83.7 kg (184 lb 9 oz)  SpO2 93%  BMI 29.79 kg/m2    Physical Exam   Constitutional: She is oriented to person, place, and time. Vital signs are normal. She appears well-developed and well-nourished. She appears ill.   HENT:   Head: Normocephalic and atraumatic.   Right Ear: Tympanic membrane, external ear and ear canal normal.   Left Ear: Tympanic membrane, external ear and ear " canal normal.   Nose: Nose normal.   Mouth/Throat: Uvula is midline, oropharynx is clear and moist and mucous membranes are normal. No posterior oropharyngeal edema or posterior oropharyngeal erythema.   Eyes: Lids are normal. Pupils are equal, round, and reactive to light.   Neck: Trachea normal and normal range of motion. Neck supple. No thyroid mass present.   Cardiovascular: Normal rate, regular rhythm, S1 normal, S2 normal, normal heart sounds and intact distal pulses.  Exam reveals no gallop and no friction rub.    No murmur heard.  Pulmonary/Chest: Effort normal and breath sounds normal. No respiratory distress. She has no wheezes. She has no rales.   Abdominal: Soft. Normal appearance and bowel sounds are normal. She exhibits no mass. There is CVA tenderness. There is no rebound and no guarding.   Musculoskeletal: Normal range of motion.   Lymphadenopathy:     She has no cervical adenopathy.   Neurological: She is alert and oriented to person, place, and time. She has normal strength. No cranial nerve deficit or sensory deficit. Gait normal.   Skin: Skin is warm and dry. No rash noted.   Psychiatric: She has a normal mood and affect. Her speech is normal and behavior is normal. Judgment and thought content normal. Cognition and memory are normal.   Nursing note and vitals reviewed.      Pertinent Labs  Office Visit on 12/27/2017   Component Date Value Ref Range Status   • Color 12/27/2017 Maureen  Yellow, Straw, Dark Yellow, Maureen Final   • Clarity, UA 12/27/2017 Turbid* Clear Final   • Glucose, UA 12/27/2017 Trace* Negative, 1000 mg/dL (3+) mg/dL Final   • Bilirubin 12/27/2017 Small (1+)* Negative Final   • Ketones, UA 12/27/2017 Negative  Negative Final   • Specific Gravity  12/27/2017 1.025  1.005 - 1.030 Final   • Blood, UA 12/27/2017 3+* Negative Final   • pH, Urine 12/27/2017 5.5  5.0 - 8.0 Final   • Protein, POC 12/27/2017 2+* Negative mg/dL Final   • Urobilinogen, UA 12/27/2017 1 E.U./dL * Normal Final    • Leukocytes 12/27/2017 Negative  Negative Final   • Nitrite, UA 12/27/2017 Positive* Negative Final   Office Visit on 12/18/2017   Component Date Value Ref Range Status   • Color 12/18/2017 Red* Yellow, Straw, Dark Yellow, Maureen Final   • Clarity, UA 12/18/2017 Turbid* Clear Final   • Glucose, UA 12/18/2017 Negative  Negative, 1000 mg/dL (3+) mg/dL Final   • Bilirubin 12/18/2017 Large (3+)* Negative Final   • Ketones, UA 12/18/2017 1+* Negative Final   • Specific Gravity  12/18/2017 1.020  1.005 - 1.030 Final   • Blood, UA 12/18/2017 3+* Negative Final   • pH, Urine 12/18/2017 5.5  5.0 - 8.0 Final   • Protein, POC 12/18/2017 3+* Negative mg/dL Final   • Urobilinogen, UA 12/18/2017 1 E.U./dL * Normal Final   • Leukocytes 12/18/2017 Large (3+)* Negative Final   • Nitrite, UA 12/18/2017 Positive* Negative Final   • Urine Culture 12/18/2017 Culture in progress   Final   • Urine Culture 12/18/2017 Mixed Gabriela Isolated   Final     Labs have been independently reviewed    Key Imaging/Tracings/POC Testing    Assessment and Medications  Problems Addressed this Visit     None      Visit Diagnoses     Urinary tract infection with hematuria, site unspecified    -  Primary    Relevant Medications    cefTRIAXone (ROCEPHIN) injection 1 g (Start on 12/27/2017 11:15 AM)    cefdinir (OMNICEF) 300 MG capsule    Other Relevant Orders    POCT urinalysis dipstick, manual (Completed)    Urine Culture - Urine, Urine, Clean Catch    Flank pain            Side effects of ordered medications discussed with patient.     Plan/Additional Notes/Instructions  Plan   1. Rocephin inj again today  2. Will resend urine off for culture  3. Continue with AZO if it is helpful  4. Repeat u/a after completion of above ordered POC    Follow-Up  Return in about 2 weeks (around 1/10/2018), or if symptoms worsen or fail to improve.    Patient/caregiver verbalizes understanding of all orders and instructions in this plan of care.           This document has  been electronically signed by LEILANI Gallagher on December 27, 2017 10:34 AM

## 2017-12-28 ENCOUNTER — TELEPHONE (OUTPATIENT)
Dept: FAMILY MEDICINE CLINIC | Facility: CLINIC | Age: 42
End: 2017-12-28

## 2017-12-28 RX ORDER — FLUCONAZOLE 150 MG/1
TABLET ORAL
Qty: 2 TABLET | Refills: 0 | Status: SHIPPED | OUTPATIENT
Start: 2017-12-28 | End: 2020-03-20

## 2017-12-29 LAB — BACTERIA SPEC AEROBE CULT: NORMAL

## 2018-01-02 ENCOUNTER — TELEPHONE (OUTPATIENT)
Dept: FAMILY MEDICINE CLINIC | Facility: CLINIC | Age: 43
End: 2018-01-02

## 2018-01-02 DIAGNOSIS — R31.9 URINARY TRACT INFECTION WITH HEMATURIA, SITE UNSPECIFIED: Primary | ICD-10-CM

## 2018-01-02 DIAGNOSIS — N39.0 URINARY TRACT INFECTION WITH HEMATURIA, SITE UNSPECIFIED: Primary | ICD-10-CM

## 2018-01-02 NOTE — TELEPHONE ENCOUNTER
I called the patient this morning to let her know her urine culture came back negative and to see how she is feeling.   She said she started passing blood again yesterday. She wants to be seen on 1/3/18 and will call in the morning for an appointment. She said she is drinking lots of cranberry juice and has not missed any antibiotics.

## 2018-01-02 NOTE — TELEPHONE ENCOUNTER
----- Message from LEILANI Gallagher sent at 1/2/2018  7:50 AM CST -----  Please call to check on her. Thank you

## 2018-01-03 ENCOUNTER — OFFICE VISIT (OUTPATIENT)
Dept: FAMILY MEDICINE CLINIC | Facility: CLINIC | Age: 43
End: 2018-01-03

## 2018-01-03 VITALS
OXYGEN SATURATION: 97 % | WEIGHT: 183 LBS | TEMPERATURE: 98.9 F | HEART RATE: 108 BPM | BODY MASS INDEX: 29.41 KG/M2 | HEIGHT: 66 IN

## 2018-01-03 DIAGNOSIS — R31.0 GROSS HEMATURIA: Primary | ICD-10-CM

## 2018-01-03 DIAGNOSIS — R10.9 FLANK PAIN: ICD-10-CM

## 2018-01-03 PROCEDURE — 99213 OFFICE O/P EST LOW 20 MIN: CPT | Performed by: NURSE PRACTITIONER

## 2018-01-03 NOTE — PROGRESS NOTES
Chief Complaint   Patient presents with   • Referral - Kidney Txp     Referral for ultrasound/renal   • Blood in Urine     x2 weeks       Subjective   Jeanie Jones is a 42 y.o. female presents to the office for evaluation of hematuria x 2 weeks.    PMH  Managed with pepcid    Anxiety:  Managed with atarax- well controlled. Takes PRN and sometimes daily    Suboxone clinic: patient goes to suboxone to prevent relapsing of opioid addiction    HPI   Patient presents for follow-up and re-evaluation of hematuria. Patient states symptoms did improve, although her urine was never straw colored, but it did lighten to an alek color. Over the last 24 hours, urine has acutely darkened to dark red/brown and bilateral flank pain symptoms have also returned. She denies fever. No discharge. No dysuria.     She is no longer taking AZO tablets in several days.     HPI  Family History   Problem Relation Age of Onset   • Rheum arthritis Mother    • Cancer Mother      lung cancer   • Heart disease Mother      Social History     Social History   • Marital status: Single     Spouse name: N/A   • Number of children: 1   • Years of education: N/A     Occupational History   • Not on file.     Social History Main Topics   • Smoking status: Current Some Day Smoker     Packs/day: 1.00     Types: Electronic Cigarette, Cigarettes   • Smokeless tobacco: Never Used      Comment: Does smoke occasionally uses ecig.    • Alcohol use No   • Drug use: No   • Sexual activity: No     Other Topics Concern   • Not on file     Social History Narrative       The following portions of the patient's history were reviewed and updated as appropriate: allergies, current medications, past family history, past medical history, past social history, past surgical history and problem list.    Review of Systems   Constitutional: Negative.  Negative for activity change, chills, fatigue, fever and unexpected weight change.   HENT: Negative.  Negative for  "congestion, ear pain, postnasal drip, rhinorrhea, sinus pressure and sore throat.    Eyes: Negative.  Negative for pain and redness.   Respiratory: Negative.  Negative for cough, choking, chest tightness, shortness of breath and wheezing.    Cardiovascular: Negative.  Negative for chest pain, palpitations and leg swelling.   Gastrointestinal: Negative.  Negative for abdominal distention, abdominal pain, constipation, diarrhea, nausea and rectal pain.   Endocrine: Negative.    Genitourinary: Positive for flank pain and hematuria. Negative for decreased urine volume, difficulty urinating, dysuria, frequency and urgency.   Musculoskeletal: Negative for gait problem and neck pain.   Skin: Negative.  Negative for rash and wound.   Allergic/Immunologic: Negative.    Neurological: Negative.  Negative for dizziness, syncope, weakness, light-headedness, numbness and headaches.   Hematological: Negative.    Psychiatric/Behavioral: Negative.  Negative for agitation, behavioral problems, confusion, self-injury, sleep disturbance and suicidal ideas. The patient is not nervous/anxious.      14 Point ROS completed with pertinent positives discussed. All other systems reviewed and are negative.       Objective   Encounter Vitals  Pulse 108  Temp 98.9 °F (37.2 °C) (Tympanic)   Ht 167.6 cm (66\")  Wt 83 kg (183 lb)  SpO2 97%  BMI 29.54 kg/m2    Physical Exam   Constitutional: She is oriented to person, place, and time. Vital signs are normal. She appears well-developed and well-nourished.   HENT:   Head: Normocephalic and atraumatic.   Right Ear: Tympanic membrane, external ear and ear canal normal.   Left Ear: Tympanic membrane, external ear and ear canal normal.   Nose: Nose normal.   Mouth/Throat: Uvula is midline, oropharynx is clear and moist and mucous membranes are normal. No posterior oropharyngeal edema or posterior oropharyngeal erythema.   Eyes: Lids are normal. Pupils are equal, round, and reactive to light.   Neck: " Trachea normal and normal range of motion. Neck supple. No thyroid mass present.   Cardiovascular: Normal rate, regular rhythm, S1 normal, S2 normal, normal heart sounds and intact distal pulses.  Exam reveals no gallop and no friction rub.    No murmur heard.  Pulmonary/Chest: Effort normal and breath sounds normal. No respiratory distress. She has no wheezes. She has no rales.   Abdominal: Soft. Normal appearance and bowel sounds are normal. She exhibits no mass. There is tenderness. There is CVA tenderness. There is no rebound and no guarding.   Musculoskeletal: Normal range of motion.   Lymphadenopathy:     She has no cervical adenopathy.   Neurological: She is alert and oriented to person, place, and time. She has normal strength. No cranial nerve deficit or sensory deficit. Gait normal.   Skin: Skin is warm and dry. No rash noted.   Psychiatric: She has a normal mood and affect. Her speech is normal and behavior is normal. Judgment and thought content normal. Cognition and memory are normal.   Nursing note and vitals reviewed.      Pertinent Labs  Office Visit on 12/27/2017   Component Date Value Ref Range Status   • Color 12/27/2017 Maureen  Yellow, Straw, Dark Yellow, Maureen Final   • Clarity, UA 12/27/2017 Turbid* Clear Final   • Glucose, UA 12/27/2017 Trace* Negative, 1000 mg/dL (3+) mg/dL Final   • Bilirubin 12/27/2017 Small (1+)* Negative Final   • Ketones, UA 12/27/2017 Negative  Negative Final   • Specific Gravity  12/27/2017 1.025  1.005 - 1.030 Final   • Blood, UA 12/27/2017 3+* Negative Final   • pH, Urine 12/27/2017 5.5  5.0 - 8.0 Final   • Protein, POC 12/27/2017 2+* Negative mg/dL Final   • Urobilinogen, UA 12/27/2017 1 E.U./dL * Normal Final   • Leukocytes 12/27/2017 Negative  Negative Final   • Nitrite, UA 12/27/2017 Positive* Negative Final   • Urine Culture 12/27/2017 No growth at 24 hours   Final   Office Visit on 12/18/2017   Component Date Value Ref Range Status   • Color 12/18/2017 Red*  Yellow, Straw, Dark Yellow, Maureen Final   • Clarity, UA 12/18/2017 Turbid* Clear Final   • Glucose, UA 12/18/2017 Negative  Negative, 1000 mg/dL (3+) mg/dL Final   • Bilirubin 12/18/2017 Large (3+)* Negative Final   • Ketones, UA 12/18/2017 1+* Negative Final   • Specific Gravity  12/18/2017 1.020  1.005 - 1.030 Final   • Blood, UA 12/18/2017 3+* Negative Final   • pH, Urine 12/18/2017 5.5  5.0 - 8.0 Final   • Protein, POC 12/18/2017 3+* Negative mg/dL Final   • Urobilinogen, UA 12/18/2017 1 E.U./dL * Normal Final   • Leukocytes 12/18/2017 Large (3+)* Negative Final   • Nitrite, UA 12/18/2017 Positive* Negative Final   • Urine Culture 12/18/2017 Culture in progress   Final   • Urine Culture 12/18/2017 Mixed Gabriela Isolated   Final     Labs have been independently reviewed    Key Imaging/Tracings/POC Testing    Assessment and Medications  Problems Addressed this Visit     None      Visit Diagnoses     Gross hematuria    -  Primary    Flank pain                Plan/Additional Notes/Instructions  Plan   1. Renal u/s ordered yesterday  2. Refer to urology after completion of renal u/s  3. List of bladder irritants given to patient to avoid    Follow-Up  Return if symptoms worsen or fail to improve, for After above ordered referral is complete.    Patient/caregiver verbalizes understanding of all orders and instructions in this plan of care.           This document has been electronically signed by LEILANI Gallagher on January 3, 2018 9:23 AM

## 2018-01-08 ENCOUNTER — TELEPHONE (OUTPATIENT)
Dept: FAMILY MEDICINE CLINIC | Facility: CLINIC | Age: 43
End: 2018-01-08

## 2018-01-08 NOTE — TELEPHONE ENCOUNTER
Called pt to inform of renal cyst. Stated that she should keep her appt for urology and let her know her appt was 2/2/18 at 11am. I told her to please give us a call if there were any questions or concerns

## 2018-01-08 NOTE — TELEPHONE ENCOUNTER
----- Message from LEILANI Gallagher sent at 1/5/2018 12:25 PM CST -----  Please advise she has a renal cyst. Will continue to refer to urology. Please f/u with this referral to make sure she is seen soon. Thank you

## 2018-01-29 ENCOUNTER — OFFICE VISIT (OUTPATIENT)
Dept: FAMILY MEDICINE CLINIC | Facility: CLINIC | Age: 43
End: 2018-01-29

## 2018-01-29 VITALS
HEART RATE: 92 BPM | HEIGHT: 66 IN | RESPIRATION RATE: 16 BRPM | DIASTOLIC BLOOD PRESSURE: 74 MMHG | TEMPERATURE: 97.8 F | BODY MASS INDEX: 28.77 KG/M2 | SYSTOLIC BLOOD PRESSURE: 108 MMHG | WEIGHT: 179 LBS | OXYGEN SATURATION: 97 %

## 2018-01-29 DIAGNOSIS — R31.0 GROSS HEMATURIA: Primary | ICD-10-CM

## 2018-01-29 DIAGNOSIS — R21 RASH: ICD-10-CM

## 2018-01-29 DIAGNOSIS — R10.9 FLANK PAIN: ICD-10-CM

## 2018-01-29 DIAGNOSIS — R11.0 NAUSEA: ICD-10-CM

## 2018-01-29 PROCEDURE — 96372 THER/PROPH/DIAG INJ SC/IM: CPT | Performed by: NURSE PRACTITIONER

## 2018-01-29 PROCEDURE — 99213 OFFICE O/P EST LOW 20 MIN: CPT | Performed by: NURSE PRACTITIONER

## 2018-01-29 RX ORDER — PROMETHAZINE HYDROCHLORIDE 25 MG/1
25 TABLET ORAL EVERY 6 HOURS PRN
Qty: 60 TABLET | Refills: 1 | Status: SHIPPED | OUTPATIENT
Start: 2018-01-29 | End: 2018-04-16 | Stop reason: SDUPTHER

## 2018-01-29 RX ORDER — KETOROLAC TROMETHAMINE 30 MG/ML
60 INJECTION, SOLUTION INTRAMUSCULAR; INTRAVENOUS ONCE
Status: COMPLETED | OUTPATIENT
Start: 2018-01-29 | End: 2018-01-29

## 2018-01-29 RX ORDER — PROMETHAZINE HYDROCHLORIDE 25 MG/1
TABLET ORAL
Qty: 60 TABLET | Refills: 1 | Status: CANCELLED | OUTPATIENT
Start: 2018-01-29

## 2018-01-29 RX ORDER — PREDNISONE 1 MG/1
5 TABLET ORAL AS NEEDED
Qty: 30 TABLET | Refills: 0 | Status: SHIPPED | OUTPATIENT
Start: 2018-01-29 | End: 2018-04-09 | Stop reason: SDUPTHER

## 2018-01-29 RX ADMIN — KETOROLAC TROMETHAMINE 60 MG: 30 INJECTION, SOLUTION INTRAMUSCULAR; INTRAVENOUS at 09:48

## 2018-01-29 NOTE — PROGRESS NOTES
Chief Complaint   Patient presents with   • Medication Problem     Meds for upset stomach       Subjective   Jeanie Jones is a 42 y.o. female presents to the office for evaluation of continued gross hematuria, and right flank pain X 2 weeks. She did not complete her toradol inj at last office visit and is requesting one today.     PMH  T2DM- recently diagnosed, treated with metformin    Chronic nausea- secondary to metformin    GERD:  Managed with pepcid    Anxiety:  Managed with atarax- well controlled. Takes PRN and sometimes daily    Suboxone clinic: patient goes to suboxone to prevent relapsing of opioid addiction    HPI      She presents today requesting to have a PA sent for her phenergan. insruance is denying phenergan stating it is no longer on formulary- but insurance letter she brings with her does not provide a list of alternatives. She takes phenergan routinely as her metformin causes her nausea.     She is also requesting a low dose prednisone to be refilled to help with her skin rash. She takes 5 mg once a week and it has been helpful.     She still c/o gross hematuria- Patient has a urology appt 2/7/2018 with Dr. Starr.  Her right flank is still hurting. She suspects her urethra has partially closed off again- last occurrence was 2-3 years prior. Had similar symptoms that were relieved by stretching the urethra.       HPI  Family History   Problem Relation Age of Onset   • Rheum arthritis Mother    • Cancer Mother      lung cancer   • Heart disease Mother      Social History     Social History   • Marital status: Single     Spouse name: N/A   • Number of children: 1   • Years of education: N/A     Occupational History   • Not on file.     Social History Main Topics   • Smoking status: Current Some Day Smoker     Packs/day: 1.00     Types: Electronic Cigarette, Cigarettes   • Smokeless tobacco: Never Used      Comment: Does smoke occasionally uses ecig.    • Alcohol use No   • Drug use: No   •  "Sexual activity: No     Other Topics Concern   • Not on file     Social History Narrative       The following portions of the patient's history were reviewed and updated as appropriate: allergies, current medications, past family history, past medical history, past social history, past surgical history and problem list.    Review of Systems   Constitutional: Negative.  Negative for activity change, chills, fatigue, fever and unexpected weight change.   HENT: Negative.  Negative for congestion, ear pain, postnasal drip, rhinorrhea, sinus pressure and sore throat.    Eyes: Negative.  Negative for pain and redness.   Respiratory: Negative.  Negative for cough, choking, chest tightness, shortness of breath and wheezing.    Cardiovascular: Negative.  Negative for chest pain, palpitations and leg swelling.   Gastrointestinal: Negative.  Negative for abdominal distention, abdominal pain, constipation, diarrhea, nausea and rectal pain.   Endocrine: Negative.    Genitourinary: Positive for flank pain and hematuria. Negative for decreased urine volume, difficulty urinating, dysuria, frequency and urgency.   Musculoskeletal: Negative for gait problem and neck pain.   Skin: Negative.  Negative for rash and wound.   Allergic/Immunologic: Negative.    Neurological: Negative.  Negative for dizziness, syncope, weakness, light-headedness, numbness and headaches.   Hematological: Negative.    Psychiatric/Behavioral: Negative.  Negative for agitation, behavioral problems, confusion, self-injury, sleep disturbance and suicidal ideas. The patient is not nervous/anxious.      14 Point ROS completed with pertinent positives discussed. All other systems reviewed and are negative.       Objective   Encounter Vitals  /74  Pulse 92  Temp 97.8 °F (36.6 °C) (Tympanic)   Resp 16  Ht 167.6 cm (66\")  Wt 81.2 kg (179 lb)  SpO2 97%  BMI 28.89 kg/m2  Vitals:    01/29/18 0835   BP: 108/74   Pulse: 92   Resp: 16   Temp: 97.8 °F (36.6 °C) " "  TempSrc: Tympanic   SpO2: 97%   Weight: 81.2 kg (179 lb)   Height: 167.6 cm (66\")       Physical Exam   Constitutional: She is oriented to person, place, and time. Vital signs are normal. She appears well-developed and well-nourished.   HENT:   Head: Normocephalic and atraumatic.   Right Ear: Tympanic membrane, external ear and ear canal normal.   Left Ear: Tympanic membrane, external ear and ear canal normal.   Nose: Nose normal.   Mouth/Throat: Uvula is midline, oropharynx is clear and moist and mucous membranes are normal. No posterior oropharyngeal edema or posterior oropharyngeal erythema.   Eyes: Lids are normal. Pupils are equal, round, and reactive to light.   Neck: Trachea normal and normal range of motion. Neck supple. No thyroid mass present.   Cardiovascular: Normal rate, regular rhythm, S1 normal, S2 normal, normal heart sounds and intact distal pulses.  Exam reveals no gallop and no friction rub.    No murmur heard.  Pulmonary/Chest: Effort normal and breath sounds normal. No respiratory distress. She has no wheezes. She has no rales.   Abdominal: Soft. Normal appearance and bowel sounds are normal. She exhibits no mass. There is tenderness. There is CVA tenderness. There is no rebound and no guarding.   RT CVA   Musculoskeletal: Normal range of motion.   Lymphadenopathy:     She has no cervical adenopathy.   Neurological: She is alert and oriented to person, place, and time. She has normal strength. No cranial nerve deficit or sensory deficit. Gait normal.   Skin: Skin is warm and dry. Rash noted.        Psychiatric: She has a normal mood and affect. Her speech is normal and behavior is normal. Judgment and thought content normal. Cognition and memory are normal.   Nursing note and vitals reviewed.      Pertinent Labs  Office Visit on 12/27/2017   Component Date Value Ref Range Status   • Color 12/27/2017 Maureen  Yellow, Straw, Dark Yellow, Maureen Final   • Clarity, UA 12/27/2017 Turbid* Clear Final "   • Glucose, UA 12/27/2017 Trace* Negative, 1000 mg/dL (3+) mg/dL Final   • Bilirubin 12/27/2017 Small (1+)* Negative Final   • Ketones, UA 12/27/2017 Negative  Negative Final   • Specific Gravity  12/27/2017 1.025  1.005 - 1.030 Final   • Blood, UA 12/27/2017 3+* Negative Final   • pH, Urine 12/27/2017 5.5  5.0 - 8.0 Final   • Protein, POC 12/27/2017 2+* Negative mg/dL Final   • Urobilinogen, UA 12/27/2017 1 E.U./dL * Normal Final   • Leukocytes 12/27/2017 Negative  Negative Final   • Nitrite, UA 12/27/2017 Positive* Negative Final   • Urine Culture 12/27/2017 No growth at 24 hours   Final   Office Visit on 12/18/2017   Component Date Value Ref Range Status   • Color 12/18/2017 Red* Yellow, Straw, Dark Yellow, Maureen Final   • Clarity, UA 12/18/2017 Turbid* Clear Final   • Glucose, UA 12/18/2017 Negative  Negative, 1000 mg/dL (3+) mg/dL Final   • Bilirubin 12/18/2017 Large (3+)* Negative Final   • Ketones, UA 12/18/2017 1+* Negative Final   • Specific Gravity  12/18/2017 1.020  1.005 - 1.030 Final   • Blood, UA 12/18/2017 3+* Negative Final   • pH, Urine 12/18/2017 5.5  5.0 - 8.0 Final   • Protein, POC 12/18/2017 3+* Negative mg/dL Final   • Urobilinogen, UA 12/18/2017 1 E.U./dL * Normal Final   • Leukocytes 12/18/2017 Large (3+)* Negative Final   • Nitrite, UA 12/18/2017 Positive* Negative Final   • Urine Culture 12/18/2017 Culture in progress   Final   • Urine Culture 12/18/2017 Mixed Gabriela Isolated   Final     Labs have been independently reviewed    Key Imaging/Tracings/POC Testing    Assessment and Medications  Problems Addressed this Visit        Musculoskeletal and Integument    Rash    Relevant Medications    predniSONE (DELTASONE) 5 MG tablet      Other Visit Diagnoses     Gross hematuria    -  Primary    Flank pain        Relevant Medications    ketorolac (TORADOL) injection 60 mg (Completed)    Nausea        Relevant Medications    promethazine (PHENERGAN) 25 MG tablet        Side effects of ordered  medications discussed with patient.     Plan/Additional Notes/Instructions  Plan   1. Will try to resend phenergan today  2. May need to d/c metformin and change to another agent   3. toradol inj in office today  4. F/u after urology appt    Follow-Up  Return if symptoms worsen or fail to improve.    Patient/caregiver verbalizes understanding of all orders and instructions in this plan of care.           This document has been electronically signed by LEILANI Gallagher on January 29, 2018 10:31 AM

## 2018-04-02 ENCOUNTER — OFFICE VISIT (OUTPATIENT)
Dept: FAMILY MEDICINE CLINIC | Facility: CLINIC | Age: 43
End: 2018-04-02

## 2018-04-02 VITALS
HEIGHT: 66 IN | HEART RATE: 80 BPM | OXYGEN SATURATION: 95 % | TEMPERATURE: 98.2 F | DIASTOLIC BLOOD PRESSURE: 82 MMHG | SYSTOLIC BLOOD PRESSURE: 118 MMHG | BODY MASS INDEX: 28.77 KG/M2 | RESPIRATION RATE: 20 BRPM | WEIGHT: 179 LBS

## 2018-04-02 DIAGNOSIS — E11.9 TYPE 2 DIABETES MELLITUS WITHOUT COMPLICATION, WITHOUT LONG-TERM CURRENT USE OF INSULIN (HCC): ICD-10-CM

## 2018-04-02 DIAGNOSIS — E55.9 VITAMIN D DEFICIENCY: ICD-10-CM

## 2018-04-02 DIAGNOSIS — Z71.89 COUNSELING FOR HORMONE REPLACEMENT THERAPY: ICD-10-CM

## 2018-04-02 DIAGNOSIS — R31.0 GROSS HEMATURIA: Primary | ICD-10-CM

## 2018-04-02 LAB
BILIRUB BLD-MCNC: ABNORMAL MG/DL
CLARITY, POC: CLEAR
COLOR UR: ABNORMAL
GLUCOSE UR STRIP-MCNC: NEGATIVE MG/DL
KETONES UR QL: NEGATIVE
LEUKOCYTE EST, POC: ABNORMAL
NITRITE UR-MCNC: NEGATIVE MG/ML
PH UR: 5.5 [PH] (ref 5–8)
PROT UR STRIP-MCNC: ABNORMAL MG/DL
RBC # UR STRIP: ABNORMAL /UL
SP GR UR: 1.01 (ref 1–1.03)
UROBILINOGEN UR QL: NORMAL

## 2018-04-02 PROCEDURE — 99213 OFFICE O/P EST LOW 20 MIN: CPT | Performed by: NURSE PRACTITIONER

## 2018-04-02 PROCEDURE — 81003 URINALYSIS AUTO W/O SCOPE: CPT | Performed by: NURSE PRACTITIONER

## 2018-04-02 RX ORDER — FLUCONAZOLE 200 MG/1
TABLET ORAL
Refills: 1 | COMMUNITY
Start: 2018-02-20 | End: 2020-03-20

## 2018-04-03 ENCOUNTER — TELEPHONE (OUTPATIENT)
Dept: FAMILY MEDICINE CLINIC | Facility: CLINIC | Age: 43
End: 2018-04-03

## 2018-04-05 ENCOUNTER — TELEPHONE (OUTPATIENT)
Dept: FAMILY MEDICINE CLINIC | Facility: CLINIC | Age: 43
End: 2018-04-05

## 2018-04-05 LAB
25(OH)D3 SERPL-MCNC: 13.4 NG/ML (ref 30–100)
ALBUMIN SERPL-MCNC: 4.2 G/DL (ref 3.2–5.5)
ALBUMIN/GLOB SERPL: 1.3 G/DL (ref 1–3)
ALP SERPL-CCNC: 56 U/L (ref 15–121)
ALT SERPL W P-5'-P-CCNC: 21 U/L (ref 10–60)
ANION GAP SERPL CALCULATED.3IONS-SCNC: 8 MMOL/L (ref 5–15)
AST SERPL-CCNC: 21 U/L (ref 10–60)
BACTERIA UR QL AUTO: ABNORMAL /HPF
BASOPHILS # BLD AUTO: 0.09 10*3/MM3 (ref 0–0.2)
BASOPHILS NFR BLD AUTO: 0.7 % (ref 0–2)
BILIRUB SERPL-MCNC: 0.3 MG/DL (ref 0.2–1)
BILIRUB UR QL STRIP: NEGATIVE
BUN BLD-MCNC: 10 MG/DL (ref 8–25)
BUN/CREAT SERPL: 16.7 (ref 7–25)
CALCIUM SPEC-SCNC: 9.1 MG/DL (ref 8.4–10.8)
CHLORIDE SERPL-SCNC: 102 MMOL/L (ref 100–112)
CHOLEST SERPL-MCNC: 290 MG/DL (ref 150–200)
CLARITY UR: CLEAR
CO2 SERPL-SCNC: 27 MMOL/L (ref 20–32)
COLOR UR: YELLOW
CREAT BLD-MCNC: 0.6 MG/DL (ref 0.4–1.3)
DEPRECATED RDW RBC AUTO: 39.1 FL (ref 36.4–46.3)
EOSINOPHIL # BLD AUTO: 0.29 10*3/MM3 (ref 0–0.7)
EOSINOPHIL NFR BLD AUTO: 2.2 % (ref 0–7)
ERYTHROCYTE [DISTWIDTH] IN BLOOD BY AUTOMATED COUNT: 12.5 % (ref 11.5–14.5)
GFR SERPL CREATININE-BSD FRML MDRD: 111 ML/MIN/1.73 (ref 55–135)
GFR SERPL CREATININE-BSD FRML MDRD: 134 ML/MIN/1.73 (ref 58–135)
GLOBULIN UR ELPH-MCNC: 3.3 GM/DL (ref 2.5–4.6)
GLUCOSE BLD-MCNC: 107 MG/DL (ref 70–100)
GLUCOSE UR STRIP-MCNC: NEGATIVE MG/DL
HBA1C MFR BLD: 6.6 % (ref 4–5.6)
HCT VFR BLD AUTO: 45.1 % (ref 35–45)
HDLC SERPL-MCNC: 31 MG/DL (ref 35–100)
HGB BLD-MCNC: 15.5 G/DL (ref 12–15.5)
HGB UR QL STRIP.AUTO: ABNORMAL
HYALINE CASTS UR QL AUTO: ABNORMAL /LPF
KETONES UR QL STRIP: NEGATIVE
LDLC SERPL CALC-MCNC: 199 MG/DL
LDLC/HDLC SERPL: 6.43 {RATIO}
LEUKOCYTE ESTERASE UR QL STRIP.AUTO: NEGATIVE
LYMPHOCYTES # BLD AUTO: 4.27 10*3/MM3 (ref 0.6–4.2)
LYMPHOCYTES NFR BLD AUTO: 32.9 % (ref 10–50)
MCH RBC QN AUTO: 30.3 PG (ref 26.5–34)
MCHC RBC AUTO-ENTMCNC: 34.4 G/DL (ref 31.4–36)
MCV RBC AUTO: 88.1 FL (ref 80–98)
MONOCYTES # BLD AUTO: 0.64 10*3/MM3 (ref 0–0.9)
MONOCYTES NFR BLD AUTO: 4.9 % (ref 0–12)
MUCOUS THREADS URNS QL MICRO: ABNORMAL /HPF
NEUTROPHILS # BLD AUTO: 7.68 10*3/MM3 (ref 2–8.6)
NEUTROPHILS NFR BLD AUTO: 59.3 % (ref 37–80)
NITRITE UR QL STRIP: NEGATIVE
PH UR STRIP.AUTO: 5.5 [PH] (ref 5.5–8)
PLATELET # BLD AUTO: 394 10*3/MM3 (ref 150–450)
PMV BLD AUTO: 9.2 FL (ref 8–12)
POTASSIUM BLD-SCNC: 3.7 MMOL/L (ref 3.4–5.4)
PROT SERPL-MCNC: 7.5 G/DL (ref 6.7–8.2)
PROT UR QL STRIP: NEGATIVE
RBC # BLD AUTO: 5.12 10*6/MM3 (ref 3.77–5.16)
RBC # UR: ABNORMAL /HPF
REF LAB TEST METHOD: ABNORMAL
SODIUM BLD-SCNC: 137 MMOL/L (ref 134–146)
SP GR UR STRIP: >=1.03 (ref 1–1.03)
SQUAMOUS #/AREA URNS HPF: ABNORMAL /HPF
T4 FREE SERPL-MCNC: 0.86 NG/DL (ref 0.78–2.19)
TRIGL SERPL-MCNC: 299 MG/DL (ref 35–160)
TSH SERPL DL<=0.05 MIU/L-ACNC: 1.42 MIU/ML (ref 0.46–4.68)
UROBILINOGEN UR QL STRIP: ABNORMAL
VIT B12 BLD-MCNC: 554 PG/ML (ref 239–931)
VLDLC SERPL-MCNC: 59.8 MG/DL
WBC NRBC COR # BLD: 12.97 10*3/MM3 (ref 3.2–9.8)
WBC UR QL AUTO: ABNORMAL /HPF

## 2018-04-05 PROCEDURE — 82607 VITAMIN B-12: CPT | Performed by: NURSE PRACTITIONER

## 2018-04-05 PROCEDURE — 85025 COMPLETE CBC W/AUTO DIFF WBC: CPT | Performed by: NURSE PRACTITIONER

## 2018-04-05 PROCEDURE — 87086 URINE CULTURE/COLONY COUNT: CPT | Performed by: NURSE PRACTITIONER

## 2018-04-05 PROCEDURE — 36415 COLL VENOUS BLD VENIPUNCTURE: CPT | Performed by: NURSE PRACTITIONER

## 2018-04-05 PROCEDURE — 80061 LIPID PANEL: CPT | Performed by: NURSE PRACTITIONER

## 2018-04-05 PROCEDURE — 82306 VITAMIN D 25 HYDROXY: CPT | Performed by: NURSE PRACTITIONER

## 2018-04-05 PROCEDURE — 84439 ASSAY OF FREE THYROXINE: CPT | Performed by: NURSE PRACTITIONER

## 2018-04-05 PROCEDURE — 84443 ASSAY THYROID STIM HORMONE: CPT | Performed by: NURSE PRACTITIONER

## 2018-04-05 PROCEDURE — 83036 HEMOGLOBIN GLYCOSYLATED A1C: CPT | Performed by: NURSE PRACTITIONER

## 2018-04-05 PROCEDURE — 81001 URINALYSIS AUTO W/SCOPE: CPT | Performed by: NURSE PRACTITIONER

## 2018-04-05 PROCEDURE — 83525 ASSAY OF INSULIN: CPT | Performed by: NURSE PRACTITIONER

## 2018-04-05 PROCEDURE — 80053 COMPREHEN METABOLIC PANEL: CPT | Performed by: NURSE PRACTITIONER

## 2018-04-05 NOTE — TELEPHONE ENCOUNTER
----- Message from LEILANI Gallagher sent at 4/5/2018  3:31 PM CDT -----  Call to see if she has gotten her appt moved up with urology sooner. She does have an infection- white count is elevated.

## 2018-04-05 NOTE — PROGRESS NOTES
Call to see if she has gotten her appt moved up with urology sooner. She does have an infection- white count is elevated.

## 2018-04-06 LAB
BACTERIA SPEC AEROBE CULT: NORMAL
INSULIN SERPL-ACNC: 57.5 UIU/ML (ref 2.6–24.9)

## 2018-04-09 ENCOUNTER — OFFICE VISIT (OUTPATIENT)
Dept: FAMILY MEDICINE CLINIC | Facility: CLINIC | Age: 43
End: 2018-04-09

## 2018-04-09 VITALS
RESPIRATION RATE: 16 BRPM | BODY MASS INDEX: 28.93 KG/M2 | SYSTOLIC BLOOD PRESSURE: 110 MMHG | DIASTOLIC BLOOD PRESSURE: 80 MMHG | HEIGHT: 66 IN | WEIGHT: 180 LBS | TEMPERATURE: 98.6 F | OXYGEN SATURATION: 97 %

## 2018-04-09 DIAGNOSIS — E78.5 DYSLIPIDEMIA (HIGH LDL; LOW HDL): ICD-10-CM

## 2018-04-09 DIAGNOSIS — E11.8 TYPE 2 DIABETES MELLITUS WITH COMPLICATION, WITHOUT LONG-TERM CURRENT USE OF INSULIN (HCC): Primary | ICD-10-CM

## 2018-04-09 DIAGNOSIS — E55.9 VITAMIN D DEFICIENCY: ICD-10-CM

## 2018-04-09 DIAGNOSIS — E16.1 HYPERINSULINEMIA: ICD-10-CM

## 2018-04-09 DIAGNOSIS — R21 RASH: ICD-10-CM

## 2018-04-09 PROCEDURE — 99214 OFFICE O/P EST MOD 30 MIN: CPT | Performed by: NURSE PRACTITIONER

## 2018-04-09 PROCEDURE — G0438 PPPS, INITIAL VISIT: HCPCS | Performed by: NURSE PRACTITIONER

## 2018-04-09 RX ORDER — ROSUVASTATIN CALCIUM 5 MG/1
5 TABLET, COATED ORAL NIGHTLY
Qty: 30 TABLET | Refills: 5 | Status: SHIPPED | OUTPATIENT
Start: 2018-04-09 | End: 2021-04-02

## 2018-04-09 RX ORDER — PEN NEEDLE, DIABETIC 30 GX3/16"
1 NEEDLE, DISPOSABLE MISCELLANEOUS DAILY
Qty: 90 EACH | Refills: 3 | Status: SHIPPED | OUTPATIENT
Start: 2018-04-09 | End: 2020-08-18

## 2018-04-09 RX ORDER — ERGOCALCIFEROL 1.25 MG/1
50000 CAPSULE ORAL WEEKLY
Qty: 12 CAPSULE | Refills: 0 | Status: SHIPPED | OUTPATIENT
Start: 2018-04-09 | End: 2020-08-18

## 2018-04-09 RX ORDER — HYDROXYZINE 50 MG/1
TABLET, FILM COATED ORAL
Qty: 60 TABLET | Refills: 3 | Status: ON HOLD | OUTPATIENT
Start: 2018-04-09 | End: 2020-10-25

## 2018-04-09 RX ORDER — ROSUVASTATIN CALCIUM 5 MG/1
5 TABLET, COATED ORAL NIGHTLY
Qty: 30 TABLET | Refills: 5 | Status: SHIPPED | OUTPATIENT
Start: 2018-04-09 | End: 2018-04-09 | Stop reason: SDUPTHER

## 2018-04-09 RX ORDER — PREDNISONE 1 MG/1
5 TABLET ORAL AS NEEDED
Qty: 30 TABLET | Refills: 0 | Status: SHIPPED | OUTPATIENT
Start: 2018-04-09 | End: 2018-06-20 | Stop reason: SDUPTHER

## 2018-04-09 NOTE — PATIENT INSTRUCTIONS
Liraglutide injection  What is this medicine?  LIRAGLUTIDE (LIR a GLOO tide) is used to improve blood sugar control in adults with type 2 diabetes. This medicine may be used with other diabetes medicines. This drug may also reduce the risk of heart attack or stroke if you have type 2 diabetes and risk factors for heart disease.  This medicine may be used for other purposes; ask your health care provider or pharmacist if you have questions.  COMMON BRAND NAME(S): Jocelyn  What should I tell my health care provider before I take this medicine?  They need to know if you have any of these conditions:  -endocrine tumors (MEN 2) or if someone in your family had these tumors  -gallbladder disease  -high cholesterol  -history of alcohol abuse problem  -history of pancreatitis  -kidney disease or if you are on dialysis  -liver disease  -previous swelling of the tongue, face, or lips with difficulty breathing, difficulty swallowing, hoarseness, or tightening of the throat  -stomach problems  -thyroid cancer or if someone in your family had thyroid cancer  -an unusual or allergic reaction to liraglutide, other medicines, foods, dyes, or preservatives  -pregnant or trying to get pregnant  -breast-feeding  How should I use this medicine?  This medicine is for injection under the skin of your upper leg, stomach area, or upper arm. You will be taught how to prepare and give this medicine. Use exactly as directed. Take your medicine at regular intervals. Do not take it more often than directed.  It is important that you put your used needles and syringes in a special sharps container. Do not put them in a trash can. If you do not have a sharps container, call your pharmacist or healthcare provider to get one.  A special MedGuide will be given to you by the pharmacist with each prescription and refill. Be sure to read this information carefully each time.  Talk to your pediatrician regarding the use of this medicine in children.  Special care may be needed.  Overdosage: If you think you have taken too much of this medicine contact a poison control center or emergency room at once.  NOTE: This medicine is only for you. Do not share this medicine with others.  What if I miss a dose?  If you miss a dose, take it as soon as you can. If it is almost time for your next dose, take only that dose. Do not take double or extra doses.  What may interact with this medicine?  -other medicines for diabetes  Many medications may cause changes in blood sugar, these include:  -alcohol containing beverages  -antiviral medicines for HIV or AIDS  -aspirin and aspirin-like drugs  -certain medicines for blood pressure, heart disease, irregular heart beat  -chromium  -diuretics  -female hormones, such as estrogens or progestins, birth control pills  -fenofibrate  -gemfibrozil  -isoniazid  -lanreotide  -male hormones or anabolic steroids  -MAOIs like Carbex, Eldepryl, Marplan, Nardil, and Parnate  -medicines for weight loss  -medicines for allergies, asthma, cold, or cough  -medicines for depression, anxiety, or psychotic disturbances  -niacin  -nicotine  -NSAIDs, medicines for pain and inflammation, like ibuprofen or naproxen  -octreotide  -pasireotide  -pentamidine  -phenytoin  -probenecid  -quinolone antibiotics such as ciprofloxacin, levofloxacin, ofloxacin  -some herbal dietary supplements  -steroid medicines such as prednisone or cortisone  -sulfamethoxazole; trimethoprim  -thyroid hormones  Some medications can hide the warning symptoms of low blood sugar (hypoglycemia). You may need to monitor your blood sugar more closely if you are taking one of these medications. These include:  -beta-blockers, often used for high blood pressure or heart problems (examples include atenolol, metoprolol, propranolol)  -clonidine  -guanethidine  -reserpine  This list may not describe all possible interactions. Give your health care provider a list of all the medicines,  herbs, non-prescription drugs, or dietary supplements you use. Also tell them if you smoke, drink alcohol, or use illegal drugs. Some items may interact with your medicine.  What should I watch for while using this medicine?  Visit your doctor or health care professional for regular checks on your progress.  Drink plenty of fluids while taking this medicine. Check with your doctor or health care professional if you get an attack of severe diarrhea, nausea, and vomiting. The loss of too much body fluid can make it dangerous for you to take this medicine.  A test called the HbA1C (A1C) will be monitored. This is a simple blood test. It measures your blood sugar control over the last 2 to 3 months. You will receive this test every 3 to 6 months.  Learn how to check your blood sugar. Learn the symptoms of low and high blood sugar and how to manage them.  Always carry a quick-source of sugar with you in case you have symptoms of low blood sugar. Examples include hard sugar candy or glucose tablets. Make sure others know that you can choke if you eat or drink when you develop serious symptoms of low blood sugar, such as seizures or unconsciousness. They must get medical help at once.  Tell your doctor or health care professional if you have high blood sugar. You might need to change the dose of your medicine. If you are sick or exercising more than usual, you might need to change the dose of your medicine.  Do not skip meals. Ask your doctor or health care professional if you should avoid alcohol. Many nonprescription cough and cold products contain sugar or alcohol. These can affect blood sugar.  Pens should never be shared. Even if the needle is changed, sharing may result in passing of viruses like hepatitis or HIV.  Wear a medical ID bracelet or chain, and carry a card that describes your disease and details of your medicine and dosage times.  What side effects may I notice from receiving this medicine?  Side effects  that you should report to your doctor or health care professional as soon as possible:  -allergic reactions like skin rash, itching or hives, swelling of the face, lips, or tongue  -breathing problems  -diarrhea that continues or is severe  -lump or swelling on the neck  -severe nausea  -signs and symptoms of infection like fever or chills; cough; sore throat; pain or trouble passing urine  -signs and symptoms of low blood sugar such as feeling anxious, confusion, dizziness, increased hunger, unusually weak or tired, sweating, shakiness, cold, irritable, headache, blurred vision, fast heartbeat, loss of consciousness  -signs and symptoms of kidney injury like trouble passing urine or change in the amount of urine  -trouble swallowing  -unusual stomach upset or pain  -vomiting  Side effects that usually do not require medical attention (report to your doctor or health care professional if they continue or are bothersome):  -constipation  -decreased appetite  -diarrhea  -fatigue  -headache  -nausea  -pain, redness, or irritation at site where injected  -stomach upset  -stuffy or runny nose  This list may not describe all possible side effects. Call your doctor for medical advice about side effects. You may report side effects to FDA at 0-213-FDA-9746.  Where should I keep my medicine?  Keep out of the reach of children.  Store unopened pen in a refrigerator between 2 and 8 degrees C (36 and 46 degrees F). Do not freeze or use if the medicine has been frozen. Protect from light and excessive heat. After you first use the pen, it can be stored at room temperature between 15 and 30 degrees C (59 and 86 degrees F) or in a refrigerator. Throw away your used pen after 30 days or after the expiration date, whichever comes first.  Do not store your pen with the needle attached. If the needle is left on, medicine may leak from the pen.  NOTE: This sheet is a summary. It may not cover all possible information. If you have  questions about this medicine, talk to your doctor, pharmacist, or health care provider.  © 2018 [employer] (2018 14:39:40)    Medicare Wellness  Personal Prevention Plan of Service     Date of Office Visit:  2018  Encounter Provider:  LEILANI Gallagher  Place of Service:  [facility]  Patient Name: Jeanie Jones  :  1975    As part of the Medicare Wellness portion of your visit today, we are providing you with this personalized preventive plan of services (PPPS). This plan is based upon recommendations of the [place] Preventive Services Task Force (USPSTF) and the Advisory Committee on Immunization Practices (ACIP).    This lists the preventive care services that should be considered, and provides dates of when you are due. Items listed as completed are up-to-date and do not require any further intervention.    Health Maintenance   Topic Date Due   • MEDICARE ANNUAL WELLNESS  2017   • DIABETIC FOOT EXAM  2018 (Originally 1975)   • URINE MICROALBUMIN  2018 (Originally 1975)   • DIABETIC EYE EXAM  2018   • HEMOGLOBIN A1C  10/05/2018   • TDAP/TD VACCINES (2 - Td) 2019   • PAP SMEAR  2020   • PNEUMOCOCCAL VACCINE (19-64 MEDIUM RISK)  Addressed   • INFLUENZA VACCINE  Completed       Orders Placed This Encounter   Procedures   • Hemoglobin A1c     Standing Status:   Future     Standing Expiration Date:   10/26/2018   • Comprehensive Metabolic Panel     Standing Status:   Future     Standing Expiration Date:   10/26/2018   • Lipid Panel     Standing Status:   Future     Standing Expiration Date:   10/26/2018   • Microalbumin / Creatinine Urine Ratio - Urine, Clean Catch     Standing Status:   Future     Standing Expiration Date:   10/26/2018   • Vitamin D 25 Hydroxy     Standing Status:   Future     Standing Expiration Date:   10/26/2018   • TSH     Standing Status:   Future     Standing Expiration  Date:   10/26/2018   • T4, Free     Standing Status:   Future     Standing Expiration Date:   10/26/2018   • Insulin, Total     Standing Status:   Future     Standing Expiration Date:   4/9/2019   • CBC & Differential     Standing Status:   Future     Standing Expiration Date:   10/26/2018     Order Specific Question:   Manual Differential     Answer:   No       Return in about 6 months (around 10/9/2018), or if symptoms worsen or fail to improve, for With full labs.        Diabetes Mellitus and Exercise  Exercising regularly is important for your overall health, especially when you have diabetes (diabetes mellitus). Exercising is not only about losing weight. It has many health benefits, such as increasing muscle strength and bone density and reducing body fat and stress. This leads to improved fitness, flexibility, and endurance, all of which result in better overall health.  Exercise has additional benefits for people with diabetes, including:  · Reducing appetite.  · Helping to lower and control blood glucose.  · Lowering blood pressure.  · Helping to control amounts of fatty substances (lipids) in the blood, such as cholesterol and triglycerides.  · Helping the body to respond better to insulin (improving insulin sensitivity).  · Reducing how much insulin the body needs.  · Decreasing the risk for heart disease by:  ¨ Lowering cholesterol and triglyceride levels.  ¨ Increasing the levels of good cholesterol.  ¨ Lowering blood glucose levels.  What is my activity plan?  Your health care provider or certified diabetes educator can help you make a plan for the type and frequency of exercise (activity plan) that works for you. Make sure that you:  · Do at least 150 minutes of moderate-intensity or vigorous-intensity exercise each week. This could be brisk walking, biking, or water aerobics.  ¨ Do stretching and strength exercises, such as yoga or weightlifting, at least 2 times a week.  ¨ Spread out your activity  over at least 3 days of the week.  · Get some form of physical activity every day.  ¨ Do not go more than 2 days in a row without some kind of physical activity.  ¨ Avoid being inactive for more than 90 minutes at a time. Take frequent breaks to walk or stretch.  · Choose a type of exercise or activity that you enjoy, and set realistic goals.  · Start slowly, and gradually increase the intensity of your exercise over time.  What do I need to know about managing my diabetes?  · Check your blood glucose before and after exercising.  ¨ If your blood glucose is higher than 240 mg/dL (13.3 mmol/L) before you exercise, check your urine for ketones. If you have ketones in your urine, do not exercise until your blood glucose returns to normal.  · Know the symptoms of low blood glucose (hypoglycemia) and how to treat it. Your risk for hypoglycemia increases during and after exercise. Common symptoms of hypoglycemia can include:  ¨ Hunger.  ¨ Anxiety.  ¨ Sweating and feeling clammy.  ¨ Confusion.  ¨ Dizziness or feeling light-headed.  ¨ Increased heart rate or palpitations.  ¨ Blurry vision.  ¨ Tingling or numbness around the mouth, lips, or tongue.  ¨ Tremors or shakes.  ¨ Irritability.  · Keep a rapid-acting carbohydrate snack available before, during, and after exercise to help prevent or treat hypoglycemia.  · Avoid injecting insulin into areas of the body that are going to be exercised. For example, avoid injecting insulin into:  ¨ The arms, when playing tennis.  ¨ The legs, when jogging.  · Keep records of your exercise habits. Doing this can help you and your health care provider adjust your diabetes management plan as needed. Write down:  ¨ Food that you eat before and after you exercise.  ¨ Blood glucose levels before and after you exercise.  ¨ The type and amount of exercise you have done.  ¨ When your insulin is expected to peak, if you use insulin. Avoid exercising at times when your insulin is peaking.  · When  you start a new exercise or activity, work with your health care provider to make sure the activity is safe for you, and to adjust your insulin, medicines, or food intake as needed.  · Drink plenty of water while you exercise to prevent dehydration or heat stroke. Drink enough fluid to keep your urine clear or pale yellow.  This information is not intended to replace advice given to you by your health care provider. Make sure you discuss any questions you have with your health care provider.  Document Released: 03/09/2005 Document Revised: 07/07/2017 Document Reviewed: 05/29/2017  [employer] Interactive Patient Education © 2017 [employer]    Dyslipidemia  Dyslipidemia is an imbalance of waxy, fat-like substances (lipids) in the blood. The body needs lipids in small amounts. Dyslipidemia often involves a high level of cholesterol or triglycerides, which are types of lipids.  Common forms of dyslipidemia include:  · High levels of bad cholesterol (LDL cholesterol). LDL is the type of cholesterol that causes fatty deposits (plaques) to build up in the blood vessels that carry blood away from your heart (arteries).  · Low levels of good cholesterol (HDL cholesterol). HDL cholesterol is the type of cholesterol that protects against heart disease. High levels of HDL remove the LDL buildup from arteries.  · High levels of triglycerides. Triglycerides are a fatty substance in the blood that is linked to a buildup of plaques in the arteries.  You can develop dyslipidemia because of the genes you are born with (primary dyslipidemia) or changes that occur during your life (secondary dyslipidemia), or as a side effect of certain medical treatments.  What are the causes?  Primary dyslipidemia is caused by changes (mutations) in genes that are passed down through families (inherited). These mutations cause several types of dyslipidemia. Mutations can result in disorders that make the body produce too much LDL cholesterol or  triglycerides, or not enough HDL cholesterol. These disorders may lead to heart disease, arterial disease, or stroke at an early age.  Causes of secondary dyslipidemia include certain lifestyle choices and diseases that lead to dyslipidemia, such as:  · Eating a diet that is high in animal fat.  · Not getting enough activity or exercise (having a sedentary lifestyle).  · Having diabetes, kidney disease, liver disease, or thyroid disease.  · Drinking large amounts of alcohol.  · Using certain types of drugs.  What increases the risk?  You may be at greater risk for dyslipidemia if you are an older man or if you are a woman who has gone through menopause. Other risk factors include:  · Having a family history of dyslipidemia.  · Taking certain medicines, including birth control pills, steroids, some diuretics, beta-blockers, and some medicines for HIV.  · Smoking cigarettes.  · Eating a high-fat diet.  · Drinking large amounts of alcohol.  · Having certain medical conditions such as diabetes, polycystic ovary syndrome (PCOS), pregnancy, kidney disease, liver disease, or hypothyroidism.  · Not exercising regularly.  · Being overweight or obese with too much belly fat.  What are the signs or symptoms?  Dyslipidemia does not usually cause any symptoms.  Very high lipid levels can cause fatty bumps under the skin (xanthomas) or a white or gray ring around the black center (pupil) of the eye. Very high triglyceride levels can cause inflammation of the pancreas (pancreatitis).  How is this diagnosed?  Your health care provider may diagnose dyslipidemia based on a routine blood test (fasting blood test). Because most people do not have symptoms of the condition, this blood testing (lipid profile) is done on adults age 20 and older and is repeated every 5 years. This test checks:  · Total cholesterol. This is a measure of the total amount of cholesterol in your blood, including LDL cholesterol, HDL cholesterol, and  triglycerides. A healthy number is below 200.  · LDL cholesterol. The target number for LDL cholesterol is different for each person, depending on individual risk factors. For most people, a number below 100 is healthy. Ask your health care provider what your LDL cholesterol number should be.  · HDL cholesterol. An HDL level of 60 or higher is best because it helps to protect against heart disease. A number below 40 for men or below 50 for women increases the risk for heart disease.  · Triglycerides. A healthy triglyceride number is below 150.  If your lipid profile is abnormal, your health care provider may do other blood tests to get more information about your condition.  How is this treated?  Treatment depends on the type of dyslipidemia that you have and your other risk factors for heart disease and stroke. Your health care provider will have a target range for your lipid levels based on this information.  For many people, treatment starts with lifestyle changes, such as diet and exercise. Your health care provider may recommend that you:  · Get regular exercise.  · Make changes to your diet.  · Quit smoking if you smoke.  If diet changes and exercise do not help you reach your goals, your health care provider may also prescribe medicine to lower lipids. The most commonly prescribed type of medicine lowers your LDL cholesterol (statin drug). If you have a high triglyceride level, your provider may prescribe another type of drug (fibrate) or an omega-3 fish oil supplement, or both.  Follow these instructions at home:  · Take over-the-counter and prescription medicines only as told by your health care provider. This includes supplements.  · Get regular exercise. Start an aerobic exercise and strength training program as told by your health care provider. Ask your health care provider what activities are safe for you. Your health care provider may recommend:  ¨ 30 minutes of aerobic activity 4-6 days a week. Brisk  walking is an example of aerobic activity.  ¨ Strength training 2 days a week.  · Eat a healthy diet as told by your health care provider. This can help you reach and maintain a healthy weight, lower your LDL cholesterol, and raise your HDL cholesterol. It may help to work with a diet and nutrition specialist (dietitian) to make a plan that is right for you. Your dietitian or health care provider may recommend:  ¨ Limiting your calories, if you are overweight.  ¨ Eating more fruits, vegetables, whole grains, fish, and lean meats.  ¨ Limiting saturated fat, trans fat, and cholesterol.  · Follow instructions from your health care provider or dietitian about eating or drinking restrictions.  · Limit alcohol intake to no more than one drink per day for nonpregnant women and two drinks per day for men. One drink equals 12 oz of beer, 5 oz of wine, or 1½ oz of hard liquor.  · Do not use any products that contain nicotine or tobacco, such as cigarettes and e-cigarettes. If you need help quitting, ask your health care provider.  · Keep all follow-up visits as told by your health care provider. This is important.  Contact a health care provider if:  · You are having trouble sticking to your exercise or diet plan.  · You are struggling to quit smoking or control your use of alcohol.  Summary  · Dyslipidemia is an imbalance of waxy, fat-like substances (lipids) in the blood. The body needs lipids in small amounts. Dyslipidemia often involves a high level of cholesterol or triglycerides, which are types of lipids.  · Treatment depends on the type of dyslipidemia that you have and your other risk factors for heart disease and stroke.  · For many people, treatment starts with lifestyle changes, such as diet and exercise. Your health care provider may also prescribe medicine to lower lipids.  This information is not intended to replace advice given to you by your health care provider. Make sure you discuss any questions you have  "with your health care provider.  Document Released: 12/23/2014 Document Revised: 08/14/2017 Document Reviewed: 08/14/2017  [employer] Interactive Patient Education © 2017 [employer]    Fat and Cholesterol Restricted Diet  Getting too much fat and cholesterol in your diet may cause health problems. Following this diet helps keep your fat and cholesterol at normal levels. This can keep you from getting sick.  What types of fat should I choose?  · Choose monosaturated and polyunsaturated fats. These are found in foods such as olive oil, canola oil, flaxseeds, walnuts, almonds, and seeds.  · Eat more omega-3 fats. Good choices include salmon, mackerel, sardines, tuna, flaxseed oil, and ground flaxseeds.  · Limit saturated fats. These are in animal products such as meats, butter, and cream. They can also be in plant products such as palm oil, palm kernel oil, and coconut oil.  · Avoid foods with partially hydrogenated oils in them. These contain trans fats. Examples of foods that have trans fats are stick margarine, some tub margarines, cookies, crackers, and other baked goods.  What general guidelines do I need to follow?  · Check food labels. Look for the words \"trans fat\" and \"saturated fat.\"  · When preparing a meal:  ¨ Fill half of your plate with vegetables and green salads.  ¨ Fill one fourth of your plate with whole grains. Look for the word \"whole\" as the first word in the ingredient list.  ¨ Fill one fourth of your plate with lean protein foods.  · Eat more foods that have fiber, like apples, carrots, beans, peas, and barley.  · Eat more home-cooked foods. Eat less at restaurants and buffets.  · Limit or avoid alcohol.  · Limit foods high in starch and sugar.  · Limit fried foods.  · Cook foods without frying them. Baking, boiling, grilling, and broiling are all great options.  · Lose weight if you are overweight. Losing even a small amount of weight can help your overall health. It can also help prevent " diseases such as diabetes and heart disease.  What foods can I eat?  Grains   Whole grains, such as whole wheat or whole grain breads, crackers, cereals, and pasta. Unsweetened oatmeal, bulgur, barley, quinoa, or brown rice. Corn or whole wheat flour tortillas.  Vegetables   Fresh or frozen vegetables (raw, steamed, roasted, or grilled). Green salads.  Fruits   All fresh, canned (in natural juice), or frozen fruits.  Meat and Other Protein Products   Ground beef (85% or leaner), grass-fed beef, or beef trimmed of fat. Skinless chicken or turkey. Ground chicken or turkey. Pork trimmed of fat. All fish and seafood. Eggs. Dried beans, peas, or lentils. Unsalted nuts or seeds. Unsalted canned or dry beans.  Dairy   Low-fat dairy products, such as skim or 1% milk, 2% or reduced-fat cheeses, low-fat ricotta or cottage cheese, or plain low-fat yogurt.  Fats and Oils   Tub margarines without trans fats. Light or reduced-fat mayonnaise and salad dressings. Avocado. Olive, canola, sesame, or safflower oils. Natural peanut or almond butter (choose ones without added sugar and oil).  The items listed above may not be a complete list of recommended foods or beverages. Contact your dietitian for more options.   What foods are not recommended?  Grains   White bread. White pasta. White rice. Cornbread. Bagels, pastries, and croissants. Crackers that contain trans fat.  Vegetables   White potatoes. Corn. Creamed or fried vegetables. Vegetables in a cheese sauce.  Fruits   Dried fruits. Canned fruit in light or heavy syrup. Fruit juice.  Meat and Other Protein Products   Fatty cuts of meat. Ribs, chicken wings, maguire, sausage, bologna, salami, chitterlings, fatback, hot dogs, bratwurst, and packaged luncheon meats. Liver and organ meats.  Dairy   Whole or 2% milk, cream, half-and-half, and cream cheese. Whole milk cheeses. Whole-fat or sweetened yogurt. Full-fat cheeses. Nondairy creamers and whipped toppings. Processed cheese,  cheese spreads, or cheese curds.  Sweets and Desserts   Corn syrup, sugars, honey, and molasses. Candy. Jam and jelly. Syrup. Sweetened cereals. Cookies, pies, cakes, donuts, muffins, and ice cream.  Fats and Oils   Butter, stick margarine, lard, shortening, ghee, or maguire fat. Coconut, palm kernel, or palm oils.  Beverages   Alcohol. Sweetened drinks (such as sodas, lemonade, and fruit drinks or punches).  The items listed above may not be a complete list of foods and beverages to avoid. Contact your dietitian for more information.   This information is not intended to replace advice given to you by your health care provider. Make sure you discuss any questions you have with your health care provider.  Document Released: 06/18/2013 Document Revised: 08/24/2017 Document Reviewed: 03/19/2015  [employer] On[employer] Interactive Patient Education © 2017 [employer]    Heart Disease Prevention  Heart disease is a leading cause of death. There are many things you can do to help prevent heart disease.  Be physically active  Physical activity is good for your heart. It helps control your blood pressure, cholesterol levels, and weight. Try to be physically active every day. Ask your health care provider what activities are best for you.  Be a healthy weight  Extra weight can strain your heart and affect your blood pressure and cholesterol levels. Lose weight with diet and exercise if recommended by your health care provider.  Eat heart-healthy foods  Follow a healthy eating plan as recommended by your health care provider or dietitian. Heart-healthy foods include:  · High-fiber foods. These include oat bran, oatmeal, and whole-grain breads and cereals.  · Fruits and vegetables.  Avoid:  · Alcohol.  · Fried foods.  · Foods high in saturated fat. These include meats, butter, whole dairy products, shortening, and coconut or palm oil.  · Salty foods. These include canned food, luncheon meat, salty snacks, and fast food.  Keep your  cholesterol levels under control  Cholesterol is a substance that is used for many important functions. When your cholesterol levels are high, cholesterol can stick to the insides of your blood vessels, making them narrow or clog. This can lead to chest pain (angina) and a heart attack.  Keep your cholesterol levels under control as recommended by your health care provider. Have your cholesterol checked at least once a year. Target cholesterol levels (in mg/dL) for most people are:  · Total cholesterol below 200.  · LDL cholesterol below 100.  · HDL cholesterol above 40 in men and above 50 in women.  · Triglycerides below 150.  Keep your blood pressure under control  Having high blood pressure (hypertension) puts you at risk for stroke and other forms of heart disease. Keep your blood pressure under control as recommended by your health care provider. Ask your health care provider if you need treatment to lower your blood pressure. If you are 18-39 years of age, have your blood pressure checked every 3-5 years. If you are 40 years of age or older, have your blood pressure checked every year.  Do not use tobacco products  Tobacco smoke can damage your heart and blood vessels. Do not use any tobacco products including cigarettes, chewing tobacco, or electronic cigarettes. If you need help quitting, ask your health care provider.  Take medicines as directed  Take medicines only as directed by your health care provider. Ask your health care provider whether you should take an aspirin every day. Taking aspirin can help reduce your risk of heart disease and stroke.  Where to find more information:  To find out more about heart disease, visit the American Heart Association's website at www.americanheart.org  This information is not intended to replace advice given to you by your health care provider. Make sure you discuss any questions you have with your health care provider.  Document Released: 08/01/2005 Document  Revised: 05/17/2017 Document Reviewed: 02/11/2015  [employer] Interactive Patient Education © 2017 [employer]    Hypoglycemia   Hypoglycemia is when the sugar (glucose) level in the blood is too low. Symptoms of low blood sugar may include:  · Feeling:  ¨ Hungry.  ¨ Worried or nervous (anxious).  ¨ Sweaty and clammy.  ¨ Confused.  ¨ Dizzy.  ¨ Sleepy.  ¨ Sick to your stomach (nauseous).  · Having:  ¨ A fast heartbeat.  ¨ A headache.  ¨ A change in your vision.  ¨ Jerky movements that you cannot control (seizure).  ¨ Nightmares.  ¨ Tingling or no feeling (numbness) around the mouth, lips, or tongue.  · Having trouble with:  ¨ Talking.  ¨ Paying attention (concentrating).  ¨ Moving (coordination).  ¨ Sleeping.  · Shaking.  · Passing out (fainting).  · Getting upset easily (irritability).  Low blood sugar can happen to people who have diabetes and people who do not have diabetes. Low blood sugar can happen quickly, and it can be an emergency.  Treating Low Blood Sugar   Low blood sugar is often treated by eating or drinking something sugary right away. If you can think clearly and swallow safely, follow the 15:15 rule:  · Take 15 grams of a fast-acting carb (carbohydrate). Some fast-acting carbs are:  ¨ 1 tube of glucose gel.  ¨ 3 sugar tablets (glucose pills).  ¨ 6-8 pieces of hard candy.  ¨ 4 oz (120 mL) of fruit juice.  ¨ 4 oz (120 mL) of regular (not diet) soda.  · Check your blood sugar 15 minutes after you take the carb.  · If your blood sugar is still at or below 70 mg/dL (3.9 mmol/L), take 15 grams of a carb again.  · If your blood sugar does not go above 70 mg/dL (3.9 mmol/L) after 3 tries, get help right away.  · After your blood sugar goes back to normal, eat a meal or a snack within 1 hour.  Treating Very Low Blood Sugar   If your blood sugar is at or below 54 mg/dL (3 mmol/L), you have very low blood sugar (severe hypoglycemia). This is an emergency. Do not wait to see if the symptoms will go away. Get  medical help right away. Call your local emergency services (911 in the [place] Do not drive yourself to the hospital.  If you have very low blood sugar and you cannot eat or drink, you may need a glucagon shot (injection). A family member or friend should learn how to check your blood sugar and how to give you a glucagon shot. Ask your doctor if you need to have a glucagon shot kit at home.  Follow these instructions at home:  General instructions   · Avoid any diets that cause you to not eat enough food. Talk with your doctor before you start any new diet.  · Take over-the-counter and prescription medicines only as told by your doctor.  · Limit alcohol to no more than 1 drink per day for nonpregnant women and 2 drinks per day for men. One drink equals 12 oz of beer, 5 oz of wine, or 1½ oz of hard liquor.  · Keep all follow-up visits as told by your doctor. This is important.  If You Have Diabetes:     · Make sure you know the symptoms of low blood sugar.  · Always keep a source of sugar with you, such as:  ¨ Sugar.  ¨ Sugar tablets.  ¨ Glucose gel.  ¨ Fruit juice.  ¨ Regular soda (not diet soda).  ¨ Milk.  ¨ Hard candy.  ¨ Honey.  · Take your medicines as told.  · Follow your exercise and meal plan.  ¨ Eat on time. Do not skip meals.  ¨ Follow your sick day plan when you cannot eat or drink normally. Make this plan ahead of time with your doctor.  · Check your blood sugar as often as told by your doctor. Always check before and after exercise.  · Share your diabetes care plan with:  ¨ Your work or school.  ¨ People you live with.  · Check your pee (urine) for ketones:  ¨ When you are sick.  ¨ As told by your doctor.  · Carry a card or wear jewelry that says you have diabetes.  If You Have Low Blood Sugar From Other Causes:     · Check your blood sugar as often as told by your doctor.  · Follow instructions from your doctor about what you cannot eat or drink.  Contact a doctor if:  · You have trouble keeping your  blood sugar in your target range.  · You have low blood sugar often.  Get help right away if:  · You still have symptoms after you eat or drink something sugary.  · Your blood sugar is at or below 54 mg/dL (3 mmol/L).  · You have jerky movements that you cannot control.  · You pass out.  These symptoms may be an emergency. Do not wait to see if the symptoms will go away. Get medical help right away. Call your local emergency services (911 in the [place] Do not drive yourself to the hospital.  This information is not intended to replace advice given to you by your health care provider. Make sure you discuss any questions you have with your health care provider.  Document Released: 03/14/2011 Document Revised: 05/25/2017 Document Reviewed: 01/20/2017  [employer] Interactive Patient Education © 2017 [employer]  Lipid Profile Test  Why am I having this test?  The lipid profile test gives results that can help predict the likelihood of developing heart disease. The test is also used to monitor treatment for high cholesterol to see if you are reaching your goals. A lipid profile measures the following:  · Total cholesterol. Cholesterol is a waxy fat in your blood. If your total cholesterol is elevated, this can increase your risk of coronary heart disease.  · High-density lipoprotein (HDL). This is known as the good cholesterol. Having a high level of HDL is good. Your HDL level may be low if you smoke or do not get enough exercise.  · Low-density lipoprotein (LDL). This is known as the bad cholesterol and is responsible for the formation of plaque in the arteries. Having a low level of LDL is best.  · Cholesterol to HDL ratio. This is calculated by dividing the total cholesterol by the HDL cholesterol. The ratio is used by health care providers for determining your risk of heart disease. A low ratio is best.  · Triglycerides. These are a type of fat in the blood responsible for providing energy to your cells. Low levels  are best.  What kind of sample is taken?  A blood sample is required for this test. It is usually collected by inserting a needle into a vein.  How do I prepare for this test?  Do not eat or drink anything after midnight on the night before the test or as directed by your health care provider.  What are the reference ranges?  Reference ranges are considered healthy ranges established after testing a large group of healthy people. Reference ranges may vary among different people, labs, and hospitals. It is your responsibility to obtain your test results. Ask the lab or department performing the test when and how you will get your results.  Reference ranges for the lipid profile test are as follows:  Total Cholesterol   · Adult or elderly: less than 200 mg/dL or less than 5.20 mmol/L (SI units).  · Child: 120-200 mg/dL.  · Infant:  mg/dL.  · Newborns:  mg/dL.  HDL   · Male: greater than 45 mg/dL or greater than 0.75 mmol/L (SI units).  · Female: greater than 55 mg/dL or greater than 0.91 mmol/L (SI units).  HDL reference values based on risk of heart disease:  · For low risk of heart disease:  ¨ Male: 60 mg/dL or 1.55 mmol/L.  ¨ Female: 70 mg/dL or 1.81 mmol/L.  · For moderate risk of heart disease:  ¨ Male: 45 mg/dL or 1.17 mmol/L.  ¨ Female: 55 mg/dL or 1.42 mmol/L.  · For high risk of heart disease:  ¨ Male: 25 mg/dL or 0.65 mmol/L.  ¨ Female: 35 mg/dL or 0.90 mmol/L.  LDL   · Adult: less than 130 mg/dL.  · Children: less than 110 mg/dL.  Cholesterol to HDL Ratio   Reference values based on risk for coronary heart disease:  · Risk that is one half average:  ¨ Male: 3.4.  ¨ Female: 3.3.  · Average risk:  ¨ Male: 5.0.  ¨ Female: 4.4.  · Risk that is two times average (moderate risk):  ¨ Male: 10.0.  ¨ Female: 7.0.  · Risk that is three times average (high risk):  ¨   ¨ Female: 11.0.  Triglycerides   · Adult or elderly:  ¨ Male:  mg/dL or 0.45-1.81 mmol/L (SI units).  ¨ Female:  mg/dL or  0.40-1.52 mmol/L (SI units).  · Children 0-5 years old:  ¨ Male: 30-86 mg/dL.  ¨ Female: 32-99 mg/dL.  · Children 6-11 years old:  ¨ Male:  mg/dL.  ¨ Female:  mg/dL.  · Children 12-15 years old:  ¨ Male:  mg/dL.  ¨ Female:  mg/dL.  · Children 16-19 years old:  ¨ Male:  mg/dL.  ¨ Female:  mg/dL.  Triglycerides should be less than 400 mg/dL even when you are not fasting.  What do the results mean?  Talk with your health care provider to discuss your results, treatment options, and if necessary, the need for more tests. Talk with your health care provider if you have any questions about your results.  Talk with your health care provider to discuss your results, treatment options, and if necessary, the need for more tests. Talk with your health care provider if you have any questions about your results.  This information is not intended to replace advice given to you by your health care provider. Make sure you discuss any questions you have with your health care provider.  Document Released: 01/11/2006 Document Revised: 08/23/2017 Document Reviewed: 04/09/2015  [employer] Interactive Patient Education © 2017 [employer]    Type 2 Diabetes Mellitus, Diagnosis, Adult  Type 2 diabetes (type 2 diabetes mellitus) is a long-term (chronic) disease. It may be caused by one or both of these problems:  · Your body does not make enough of a hormone called insulin.  · Your body does not react in a normal way to insulin that it makes.  Insulin lets sugars (glucose) go into cells in the body. This gives you energy. If you have type 2 diabetes, sugars cannot get into cells. This causes high blood sugar (hyperglycemia).  Your doctor will set treatment goals for you. Generally, you should have these blood sugar levels:  · Before meals (preprandial):  mg/dL (4.4-7.2 mmol/L).  · After meals (postprandial): below 180 mg/dL (10 mmol/L).  · A1c (hemoglobin A1c) level: less than 7%.  Follow these  instructions at home:  Questions to Ask Your Doctor     You may want to ask these questions:  · Do I need to meet with a diabetes educator?  · Where can I find a support group for people with diabetes?  · What equipment will I need to care for myself at home?  · What diabetes medicines do I need? When should I take them?  · How often do I need to check my blood sugar?  · What number can I call if I have questions?  · When is my next doctor's visit?  General instructions   · Take over-the-counter and prescription medicines only as told by your doctor.  · Keep all follow-up visits as told by your doctor. This is important.  Contact a doctor if:  · Your blood sugar is at or above 240 mg/dL (13.3 mmol/L) for 2 days in a row.  · You have been sick or have had a fever for 2 days or more and you are not getting better.  · You have any of these problems for more than 6 hours:  ¨ You cannot eat or drink.  ¨ You feel sick to your stomach (nauseous).  ¨ You throw up (vomit).  ¨ You have watery poop (diarrhea).  Get help right away if:  · Your blood sugar is lower than 54 mg/dL (3 mmol/L).  · You get confused.  · You have trouble:  ¨ Thinking clearly.  ¨ Breathing.  · You have moderate or large ketone levels in your pee (urine).  This information is not intended to replace advice given to you by your health care provider. Make sure you discuss any questions you have with your health care provider.  Document Released: 09/26/2009 Document Revised: 05/25/2017 Document Reviewed: 01/20/2017  [employer] Interactive Patient Education © 2017 [employer] Inc.

## 2018-04-09 NOTE — PROGRESS NOTES
QUICK REFERENCE INFORMATION:  The ABCs of the Annual Wellness Visit    Initial Medicare Wellness Visit    HEALTH RISK ASSESSMENT    1975    Recent Hospitalizations:  No hospitalization(s) within the last year..no        Current Medical Providers:  Patient Care Team:  LEILANI Gallagher as PCP - General (Nurse Practitioner)  LEILANI Gallagher as PCP - Claims Attributed        Smoking Status:  History   Smoking Status   • Current Some Day Smoker   • Packs/day: 1.00   • Types: Electronic Cigarette, Cigarettes   Smokeless Tobacco   • Never Used     Comment: Does smoke occasionally uses ecig.        Alcohol Consumption:  History   Alcohol Use No       Depression Screen:   PHQ-2/PHQ-9 Depression Screening 4/2/2018   Little interest or pleasure in doing things 0   Feeling down, depressed, or hopeless 0   Trouble falling or staying asleep, or sleeping too much -   Feeling tired or having little energy -   Poor appetite or overeating -   Feeling bad about yourself - or that you are a failure or have let yourself or your family down -   Trouble concentrating on things, such as reading the newspaper or watching television -   Moving or speaking so slowly that other people could have noticed. Or the opposite - being so fidgety or restless that you have been moving around a lot more than usual -   Thoughts that you would be better off dead, or of hurting yourself in some way -   Total Score 0   If you checked off any problems, how difficult have these problems made it for you to do your work, take care of things at home, or get along with other people? -       Health Habits and Functional and Cognitive Screening:  No flowsheet data found.        Does the patient have evidence of cognitive impairment? Nono    Asiprin use counseling: Does not need ASA (and currently is not on it)no      Recent Lab Results:    Visual Acuity:  No exam data present    Age-appropriate Screening Schedule:  Refer to the list below for future  screening recommendations based on patient's age, sex and/or medical conditions. Orders for these recommended tests are listed in the plan section. The patient has been provided with a written plan.    Health Maintenance   Topic Date Due   • DIABETIC FOOT EXAM  08/22/2018 (Originally 1975)   • URINE MICROALBUMIN  08/22/2018 (Originally 1975)   • DIABETIC EYE EXAM  07/20/2018   • HEMOGLOBIN A1C  10/05/2018   • TDAP/TD VACCINES (2 - Td) 03/22/2019   • PAP SMEAR  09/19/2020   • PNEUMOCOCCAL VACCINE (19-64 MEDIUM RISK)  Addressed   • INFLUENZA VACCINE  Completed        Subjective   History of Present Illness    Jeanie Jones is a 42 y.o. female who presents for an Annual Wellness Visit.    The following portions of the patient's history were reviewed and updated as appropriate: allergies, current medications, past family history, past medical history, past social history, past surgical history and problem list.yes    Outpatient Medications Prior to Visit   Medication Sig Dispense Refill   • buprenorphine-naloxone (SUBOXONE) 8-2 MG per SL tablet   0   • Crisaborole (EUCRISA) 2 % ointment Apply 60 tubes topically 2 (Two) Times a Day. 60 tube 5   • famotidine (PEPCID) 40 MG tablet TAKE 1 TABLET BY MOUTH DAILY. 30 tablet 1   • fluconazole (DIFLUCAN) 150 MG tablet Take one if not better in 3 days take 2nd dose 2 tablet 0   • fluconazole (DIFLUCAN) 200 MG tablet   1   • hydrOXYzine (ATARAX) 50 MG tablet TAKE 1 TABLET BY MOUTH EVERY 6 (SIX) HOURS AS NEEDED FOR ITCHING OR ANXIETY. 60 tablet 3   • metFORMIN ER (GLUCOPHAGE-XR) 500 MG 24 hr tablet Take 2 tablets by mouth 2 (Two) Times a Day With Meals. 60 tablet 5   • predniSONE (DELTASONE) 5 MG tablet Take 1 tablet by mouth As Needed (rash). 30 tablet 0   • promethazine (PHENERGAN) 25 MG tablet Take 1 tablet by mouth Every 6 (Six) Hours As Needed for Nausea or Vomiting. 60 tablet 1     No facility-administered medications prior to visit.        Patient Active  "Problem List   Diagnosis   • Anxiety   • Arthralgia   • Rash   • GERD (gastroesophageal reflux disease)   • Type 2 diabetes mellitus without complication, without long-term current use of insulin       Advance Care Planning:  has NO advance directive - not interested in additional informationno    Identification of Risk Factors:  Risk factors include: weight , unhealthy diet, chronic pain and caretaker stress.    Review of Systems    Compared to one year ago, the patient feels her physical health is worse.worse  Compared to one year ago, the patient feels her mental health is the same.same    Objective     Physical Exam    Vitals:    04/09/18 1020   BP: 110/80   Resp: 16   Temp: 98.6 °F (37 °C)   TempSrc: Tympanic   SpO2: 97%   Weight: 81.6 kg (180 lb)   Height: 167.6 cm (66\")   PainSc: 0-No pain       Body mass index is 29.05 kg/m².  Discussed the patient's BMI with her. BMI is within normal parameters. No follow-up required.28.9    Assessment/Plan   Patient Self-Management and Personalized Health Advice  The patient has been provided with information about: diet, exercise and weight management and preventive services including:   · Advance directive, Diabetes screening, see lab orders, Exercise counseling provided.    Visit Diagnoses:  No diagnosis found.    No orders of the defined types were placed in this encounter.      Outpatient Encounter Prescriptions as of 4/9/2018   Medication Sig Dispense Refill   • buprenorphine-naloxone (SUBOXONE) 8-2 MG per SL tablet   0   • Crisaborole (EUCRISA) 2 % ointment Apply 60 tubes topically 2 (Two) Times a Day. 60 tube 5   • famotidine (PEPCID) 40 MG tablet TAKE 1 TABLET BY MOUTH DAILY. 30 tablet 1   • fluconazole (DIFLUCAN) 150 MG tablet Take one if not better in 3 days take 2nd dose 2 tablet 0   • fluconazole (DIFLUCAN) 200 MG tablet   1   • hydrOXYzine (ATARAX) 50 MG tablet TAKE 1 TABLET BY MOUTH EVERY 6 (SIX) HOURS AS NEEDED FOR ITCHING OR ANXIETY. 60 tablet 3   • " metFORMIN ER (GLUCOPHAGE-XR) 500 MG 24 hr tablet Take 2 tablets by mouth 2 (Two) Times a Day With Meals. 60 tablet 5   • predniSONE (DELTASONE) 5 MG tablet Take 1 tablet by mouth As Needed (rash). 30 tablet 0   • promethazine (PHENERGAN) 25 MG tablet Take 1 tablet by mouth Every 6 (Six) Hours As Needed for Nausea or Vomiting. 60 tablet 1     No facility-administered encounter medications on file as of 4/9/2018.        Reviewed use of high risk medication in the elderly: not applicable  Reviewed for potential of harmful drug interactions in the elderly: not applicable    Follow Up:  No Follow-up on file.     An After Visit Summary and PPPS with all of these plans were given to the patient.

## 2018-04-09 NOTE — PROGRESS NOTES
Chief Complaint   Patient presents with   • Diabetes     pt here to f/u on labs   • Follow-up     lab review       Subjective   Jeanie Jones is a 42 y.o. female presents to the office for evaluation of T2DM and review of labs.    PMH  T2DM- unstable; worsening   Failed metformin- made sick and states it made her FBG increase   Insulin free 57.5     Chronic nausea- secondary to metformin    GERD: stable; Managed with Pepcid    Gross hematuria- worsening; followed by Dr. Starr   Next appt 04/17/18    Anxiety: stable; Managed with atarax- well controlled. Takes PRN and sometimes daily    Rash- stable; prn prednisone    Suboxone clinic: patient goes to suboxone to prevent relapsing of opioid addiction    HPI   Patient presents for re-evaluation and review of labs. She continues to c/o gross hematuria- will be seeing Dr. Starr on 04/17/18.  Urine cultures are negative for bacterial growth. Will hold off on abx treatment for now. Patient states her elevated wbc is likely from her tooth abscess.     No new complaints today. She quit taking metformin several months ago because it made her nauseous, and she states it made her FBG increase. She denies heart healthy diet and daily/weekly exercise. She denies recent weight gain or loss. No polyuria or polydipsia.     Labs reviewed- worsening T2DM, elevated insulin level, dyslipidemia      Diabetes   She presents for her follow-up diabetic visit. She has type 2 diabetes mellitus. No MedicAlert identification noted. Her disease course has been worsening. There are no hypoglycemic associated symptoms. Pertinent negatives for hypoglycemia include no confusion, dizziness, headaches or nervousness/anxiousness. Associated symptoms include fatigue and weakness. Pertinent negatives for diabetes include no chest pain, no foot paresthesias, no foot ulcerations, no polydipsia and no polyphagia. There are no hypoglycemic complications. Symptoms are worsening. There are no  diabetic complications. Risk factors for coronary artery disease include family history, dyslipidemia, diabetes mellitus, hypertension, stress and tobacco exposure. When asked about current treatments, none were reported. She is compliant with treatment none of the time. Her weight is stable. She is following a generally unhealthy diet. When asked about meal planning, she reported none. She has not had a previous visit with a dietitian. She rarely participates in exercise. Her home blood glucose trend is fluctuating minimally. Her overall blood glucose range is 140-180 mg/dl. An ACE inhibitor/angiotensin II receptor blocker is being taken. She does not see a podiatrist.Eye exam is current.   Hyperlipidemia   This is a new problem. This is a new diagnosis. The problem is uncontrolled. Recent lipid tests were reviewed and are high. Exacerbating diseases include diabetes. Factors aggravating her hyperlipidemia include smoking and fatty foods. Associated symptoms include myalgias. Pertinent negatives include no chest pain or shortness of breath. She is currently on no antihyperlipidemic treatment. The current treatment provides no improvement of lipids. Compliance problems include adherence to diet, adherence to exercise and medication side effects.  Risk factors for coronary artery disease include dyslipidemia, family history, hypertension, stress and a sedentary lifestyle.     Family History   Problem Relation Age of Onset   • Rheum arthritis Mother    • Cancer Mother      lung cancer   • Heart disease Mother      Social History     Social History   • Marital status: Single     Spouse name: N/A   • Number of children: 1   • Years of education: N/A     Occupational History   • Not on file.     Social History Main Topics   • Smoking status: Current Some Day Smoker     Packs/day: 1.00     Types: Electronic Cigarette, Cigarettes   • Smokeless tobacco: Never Used      Comment: Does smoke occasionally uses ecig.    •  "Alcohol use No   • Drug use: No   • Sexual activity: No     Other Topics Concern   • Not on file     Social History Narrative   • No narrative on file       The following portions of the patient's history were reviewed and updated as appropriate: allergies, current medications, past family history, past medical history, past social history, past surgical history and problem list.    Review of Systems   Constitutional: Positive for fatigue. Negative for activity change, chills, fever and unexpected weight change.   HENT: Negative.  Negative for congestion, ear pain, postnasal drip, rhinorrhea, sinus pressure and sore throat.    Eyes: Negative.  Negative for pain and redness.   Respiratory: Negative.  Negative for cough, choking, chest tightness, shortness of breath and wheezing.    Cardiovascular: Negative.  Negative for chest pain, palpitations and leg swelling.   Gastrointestinal: Negative.  Negative for abdominal distention, abdominal pain, constipation, diarrhea, nausea and rectal pain.   Endocrine: Negative.  Negative for polydipsia and polyphagia.   Genitourinary: Negative.  Negative for decreased urine volume, difficulty urinating, dysuria, flank pain, hematuria and urgency.   Musculoskeletal: Positive for myalgias. Negative for gait problem and neck pain.   Skin: Negative.  Negative for rash and wound.   Allergic/Immunologic: Negative.    Neurological: Positive for weakness. Negative for dizziness, syncope, light-headedness, numbness and headaches.   Hematological: Negative.    Psychiatric/Behavioral: Negative.  Negative for agitation, behavioral problems, confusion, self-injury, sleep disturbance and suicidal ideas. The patient is not nervous/anxious.      14 Point ROS completed with pertinent positives discussed. All other systems reviewed and are negative.       Objective   Encounter Vitals  /80   Temp 98.6 °F (37 °C) (Tympanic)   Resp 16   Ht 167.6 cm (66\")   Wt 81.6 kg (180 lb)   SpO2 97%   " "BMI 29.05 kg/m²   Vitals:    04/09/18 1020   BP: 110/80   Resp: 16   Temp: 98.6 °F (37 °C)   TempSrc: Tympanic   SpO2: 97%   Weight: 81.6 kg (180 lb)   Height: 167.6 cm (66\")       Physical Exam   Constitutional: She is oriented to person, place, and time. Vital signs are normal. She appears well-developed and well-nourished.   HENT:   Head: Normocephalic and atraumatic.   Right Ear: Tympanic membrane, external ear and ear canal normal.   Left Ear: Tympanic membrane, external ear and ear canal normal.   Nose: Nose normal.   Mouth/Throat: Uvula is midline, oropharynx is clear and moist and mucous membranes are normal. No posterior oropharyngeal edema or posterior oropharyngeal erythema.   Eyes: Lids are normal. Pupils are equal, round, and reactive to light.   Neck: Trachea normal and normal range of motion. Neck supple. No thyroid mass present.   Cardiovascular: Normal rate, regular rhythm, S1 normal, S2 normal, normal heart sounds and intact distal pulses.  Exam reveals no gallop and no friction rub.    No murmur heard.  Pulmonary/Chest: Effort normal and breath sounds normal. No respiratory distress. She has no wheezes. She has no rales.   Abdominal: Soft. Normal appearance and bowel sounds are normal. She exhibits no mass. There is no tenderness. There is no rebound, no guarding and no CVA tenderness.   Musculoskeletal: Normal range of motion.   Lymphadenopathy:     She has no cervical adenopathy.   Neurological: She is alert and oriented to person, place, and time. She has normal strength. No cranial nerve deficit or sensory deficit. Gait normal.   Skin: Skin is warm and dry. Rash noted.   Psychiatric: She has a normal mood and affect. Her speech is normal and behavior is normal. Judgment and thought content normal. Cognition and memory are normal.   Nursing note and vitals reviewed.      Pertinent Labs  Office Visit on 04/02/2018   Component Date Value Ref Range Status   • Glucose 04/05/2018 107* 70 - 100 mg/dL " Final   • BUN 04/05/2018 10  8 - 25 mg/dL Final   • Creatinine 04/05/2018 0.60  0.40 - 1.30 mg/dL Final   • Sodium 04/05/2018 137  134 - 146 mmol/L Final   • Potassium 04/05/2018 3.7  3.4 - 5.4 mmol/L Final   • Chloride 04/05/2018 102  100 - 112 mmol/L Final   • CO2 04/05/2018 27.0  20.0 - 32.0 mmol/L Final   • Calcium 04/05/2018 9.1  8.4 - 10.8 mg/dL Final   • Total Protein 04/05/2018 7.5  6.7 - 8.2 g/dL Final   • Albumin 04/05/2018 4.20  3.20 - 5.50 g/dL Final   • ALT (SGPT) 04/05/2018 21  10 - 60 U/L Final   • AST (SGOT) 04/05/2018 21  10 - 60 U/L Final   • Alkaline Phosphatase 04/05/2018 56  15 - 121 U/L Final   • Total Bilirubin 04/05/2018 0.3  0.2 - 1.0 mg/dL Final   • eGFR Non African Amer 04/05/2018 111  55 - 135 mL/min/1.73 Final   • eGFR  African Amer 04/05/2018 134  58 - 135 mL/min/1.73 Final   • Globulin 04/05/2018 3.3  2.5 - 4.6 gm/dL Final   • A/G Ratio 04/05/2018 1.3  1.0 - 3.0 g/dL Final   • BUN/Creatinine Ratio 04/05/2018 16.7  7.0 - 25.0 Final   • Anion Gap 04/05/2018 8.0  5.0 - 15.0 mmol/L Final   • Hemoglobin A1C 04/05/2018 6.6* 4 - 5.6 % Final   • Total Cholesterol 04/05/2018 290* 150 - 200 mg/dL Final   • Triglycerides 04/05/2018 299* 35 - 160 mg/dL Final   • HDL Cholesterol 04/05/2018 31* 35 - 100 mg/dL Final   • LDL Cholesterol  04/05/2018 199  mg/dL Final   • VLDL Cholesterol 04/05/2018 59.8  mg/dL Final   • LDL/HDL Ratio 04/05/2018 6.43   Final   • TSH 04/05/2018 1.420  0.460 - 4.680 mIU/mL Final   • Free T4 04/05/2018 0.86  0.78 - 2.19 ng/dL Final   • 25 Hydroxy, Vitamin D 04/05/2018 13.4* 30.0 - 100.0 ng/ml Final   • Vitamin B-12 04/05/2018 554  239 - 931 pg/mL Final   • Urine Culture 04/06/2018 No growth at 24 hours   Final   • Insulin 04/06/2018 57.5* 2.6 - 24.9 uIU/mL Final   • Color 04/02/2018 Dark Yellow  Yellow, Straw, Dark Yellow, Maureen Final   • Clarity, UA 04/02/2018 Clear  Clear Final   • Glucose, UA 04/02/2018 Negative  Negative, 1000 mg/dL (3+) mg/dL Final   • Bilirubin  04/02/2018 Small (1+)* Negative Final   • Ketones, UA 04/02/2018 Negative  Negative Final   • Specific Gravity  04/02/2018 1.015  1.005 - 1.030 Final   • Blood, UA 04/02/2018 2+* Negative Final   • pH, Urine 04/02/2018 5.5  5.0 - 8.0 Final   • Protein, POC 04/02/2018 1+* Negative mg/dL Final   • Urobilinogen, UA 04/02/2018 Normal  Normal Final   • Leukocytes 04/02/2018 Trace* Negative Final   • Nitrite, UA 04/02/2018 Negative  Negative Final   • Color, UA 04/05/2018 Yellow  Yellow, Straw Final   • Appearance, UA 04/05/2018 Clear  Clear Final   • pH, UA 04/05/2018 5.5  5.5 - 8.0 Final   • Specific Gravity, UA 04/05/2018 >=1.030  1.005 - 1.030 Final   • Glucose, UA 04/05/2018 Negative  Negative Final   • Ketones, UA 04/05/2018 Negative  Negative Final   • Bilirubin, UA 04/05/2018 Negative  Negative Final   • Blood, UA 04/05/2018 Small (1+)* Negative Final   • Protein, UA 04/05/2018 Negative  Negative Final   • Leuk Esterase, UA 04/05/2018 Negative  Negative Final   • Nitrite, UA 04/05/2018 Negative  Negative Final   • Urobilinogen, UA 04/05/2018 0.2 E.U./dL  0.2 - 1.0 E.U./dL Final   • RBC, UA 04/05/2018 3-5* None Seen /HPF Final   • WBC, UA 04/05/2018 0-2* None Seen /HPF Final   • Bacteria, UA 04/05/2018 Trace* None Seen /HPF Final   • Squamous Epithelial Cells, UA 04/05/2018 3-6* None Seen, 0-2 /HPF Final   • Hyaline Casts, UA 04/05/2018 None Seen  None Seen /LPF Final   • Mucus, UA 04/05/2018 Small/1+* None Seen, Trace /HPF Final   • Methodology 04/05/2018 Manual Light Microscopy   Final   • WBC 04/05/2018 12.97* 3.20 - 9.80 10*3/mm3 Final   • RBC 04/05/2018 5.12  3.77 - 5.16 10*6/mm3 Final   • Hemoglobin 04/05/2018 15.5  12.0 - 15.5 g/dL Final   • Hematocrit 04/05/2018 45.1* 35.0 - 45.0 % Final   • MCV 04/05/2018 88.1  80.0 - 98.0 fL Final   • MCH 04/05/2018 30.3  26.5 - 34.0 pg Final   • MCHC 04/05/2018 34.4  31.4 - 36.0 g/dL Final   • RDW 04/05/2018 12.5  11.5 - 14.5 % Final   • RDW-SD 04/05/2018 39.1  36.4 -  46.3 fl Final   • MPV 04/05/2018 9.2  8.0 - 12.0 fL Final   • Platelets 04/05/2018 394  150 - 450 10*3/mm3 Final   • Neutrophil % 04/05/2018 59.3  37.0 - 80.0 % Final   • Lymphocyte % 04/05/2018 32.9  10.0 - 50.0 % Final   • Monocyte % 04/05/2018 4.9  0.0 - 12.0 % Final   • Eosinophil % 04/05/2018 2.2  0.0 - 7.0 % Final   • Basophil % 04/05/2018 0.7  0.0 - 2.0 % Final   • Neutrophils, Absolute 04/05/2018 7.68  2.00 - 8.60 10*3/mm3 Final   • Lymphocytes, Absolute 04/05/2018 4.27* 0.60 - 4.20 10*3/mm3 Final   • Monocytes, Absolute 04/05/2018 0.64  0.00 - 0.90 10*3/mm3 Final   • Eosinophils, Absolute 04/05/2018 0.29  0.00 - 0.70 10*3/mm3 Final   • Basophils, Absolute 04/05/2018 0.09  0.00 - 0.20 10*3/mm3 Final     Labs have been independently reviewed    Key Imaging/Tracings/POC Testing    Assessment and Medications  Problems Addressed this Visit        Digestive    Vitamin D deficiency     Relevant Medications    ergocalciferol (ERGOCALCIFEROL) 39203 units capsule    Other Relevant Orders    Vitamin D 25 Hydroxy       Endocrine    Type 2 diabetes mellitus with complication, without long-term current use of insulin - Primary    Relevant Medications    ergocalciferol (ERGOCALCIFEROL) 56078 units capsule    Insulin Pen Needle (PEN NEEDLES) 32G X 4 MM misc    Liraglutide (VICTOZA) 18 MG/3ML solution pen-injector injection    Other Relevant Orders    Hemoglobin A1c    Comprehensive Metabolic Panel    CBC & Differential    Lipid Panel    Microalbumin / Creatinine Urine Ratio - Urine, Clean Catch    Vitamin D 25 Hydroxy    TSH    T4, Free    Insulin, Total    Hyperinsulinemia    Relevant Medications    ergocalciferol (ERGOCALCIFEROL) 36608 units capsule    Liraglutide (VICTOZA) 18 MG/3ML solution pen-injector injection       Musculoskeletal and Integument    Rash    Relevant Medications    predniSONE (DELTASONE) 5 MG tablet       Other    Dyslipidemia (high LDL; low HDL)    Relevant Medications    rosuvastatin (CRESTOR) 5 MG  tablet      Other Visit Diagnoses    None.       Side effects of ordered medications discussed with patient.     Plan/Additional Notes/Instructions  Plan   1. T2DM- poorly controlled. Start victoza. F/u in 6 months with labs  2. Dyslipidemia- new diagnoses- start crestor, will titrate up after lab review in 6 months  3. Refill of prednisone for arthralgia and rash  4. Patient MUST call to be seen by rheumatology. If she can not get into previous referral, may consider referring to [facility]  5. Vit d supplementation ordered  6. labwork in 6 months    Follow-Up  Return in about 6 months (around 10/9/2018), or if symptoms worsen or fail to improve, for With full labs.    Patient/caregiver verbalizes understanding of all orders and instructions in this plan of care.           This document has been electronically signed by LEILANI Gallagher on April 9, 2018 12:04 PM

## 2018-04-16 DIAGNOSIS — R11.0 NAUSEA: ICD-10-CM

## 2018-04-16 RX ORDER — PROMETHAZINE HYDROCHLORIDE 25 MG/1
TABLET ORAL
Qty: 20 TABLET | Refills: 0 | Status: SHIPPED | OUTPATIENT
Start: 2018-04-16 | End: 2018-05-11 | Stop reason: SDUPTHER

## 2018-04-23 ENCOUNTER — OFFICE VISIT (OUTPATIENT)
Dept: FAMILY MEDICINE CLINIC | Facility: CLINIC | Age: 43
End: 2018-04-23

## 2018-04-23 VITALS
BODY MASS INDEX: 28.61 KG/M2 | RESPIRATION RATE: 20 BRPM | TEMPERATURE: 98.3 F | HEART RATE: 78 BPM | HEIGHT: 66 IN | DIASTOLIC BLOOD PRESSURE: 78 MMHG | SYSTOLIC BLOOD PRESSURE: 110 MMHG | WEIGHT: 178 LBS | OXYGEN SATURATION: 98 %

## 2018-04-23 DIAGNOSIS — J01.00 SUBACUTE MAXILLARY SINUSITIS: ICD-10-CM

## 2018-04-23 DIAGNOSIS — J06.9 ACUTE URI: Primary | ICD-10-CM

## 2018-04-23 PROCEDURE — 99213 OFFICE O/P EST LOW 20 MIN: CPT | Performed by: NURSE PRACTITIONER

## 2018-04-23 RX ORDER — METHYLPREDNISOLONE ACETATE 80 MG/ML
80 INJECTION, SUSPENSION INTRA-ARTICULAR; INTRALESIONAL; INTRAMUSCULAR; SOFT TISSUE ONCE
Status: COMPLETED | OUTPATIENT
Start: 2018-04-23 | End: 2018-04-24

## 2018-04-23 RX ORDER — CEFTRIAXONE 1 G/1
1 INJECTION, POWDER, FOR SOLUTION INTRAMUSCULAR; INTRAVENOUS ONCE
Status: COMPLETED | OUTPATIENT
Start: 2018-04-23 | End: 2018-04-24

## 2018-04-23 NOTE — PROGRESS NOTES
"Chief Complaint   Patient presents with   • Sinusitis     pt states \"having sinus drainage on left side of face and neck.\"       Subjective   Jeanie Jones is a 42 y.o. female presents to the office for evaluation of sinusitis X 2 days.    PMH  T2DM- unstable; worsening   Failed metformin- made sick and states it made her FBG increase   Insulin free 57.5     Chronic nausea- secondary to metformin    GERD: stable; Managed with Pepcid    Gross hematuria- worsening; followed by Dr. Starr   Next appt 04/17/18    Anxiety: stable; Managed with atarax- well controlled. Takes PRN and sometimes daily    Rash- stable; prn prednisone    Suboxone clinic: patient goes to suboxone to prevent relapsing of opioid addiction    HPI   Patient presents with acute c/o sinus pressure and left jaw pain. Associated symptoms include throat pain, voice changes,  and low grade fever. Tmax 100. She has taken otc sinus medication IBU with mild relief of symptoms. No body aches. No colored drainage.       Sinusitis   This is a recurrent problem. The current episode started yesterday. The problem has been gradually worsening since onset. There has been no fever. The fever has been present for less than 1 day. Her pain is at a severity of 6/10. Associated symptoms include congestion, coughing, neck pain, sinus pressure, a sore throat and swollen glands. Pertinent negatives include no chills, diaphoresis, ear pain, headaches, hoarse voice, shortness of breath or sneezing. Past treatments include nothing. The treatment provided no relief.   URI    This is a new problem. The current episode started yesterday. The problem has been gradually worsening. The maximum temperature recorded prior to her arrival was 100.4 - 100.9 F. Associated symptoms include congestion, coughing, neck pain, a sore throat and swollen glands. Pertinent negatives include no abdominal pain, chest pain, diarrhea, dysuria, ear pain, headaches, nausea, rash, rhinorrhea, " sinus pain, sneezing or wheezing. She has tried NSAIDs for the symptoms. The treatment provided mild relief.     Family History   Problem Relation Age of Onset   • Rheum arthritis Mother    • Cancer Mother      lung cancer   • Heart disease Mother      Social History     Social History   • Marital status: Single     Spouse name: N/A   • Number of children: 1   • Years of education: N/A     Occupational History   • Not on file.     Social History Main Topics   • Smoking status: Current Some Day Smoker     Packs/day: 1.00     Types: Electronic Cigarette, Cigarettes   • Smokeless tobacco: Never Used      Comment: Does smoke occasionally uses ecig.    • Alcohol use No   • Drug use: No   • Sexual activity: No     Other Topics Concern   • Not on file     Social History Narrative   • No narrative on file       The following portions of the patient's history were reviewed and updated as appropriate: allergies, current medications, past family history, past medical history, past social history, past surgical history and problem list.    Review of Systems   Constitutional: Negative.  Negative for activity change, chills, diaphoresis, fatigue, fever and unexpected weight change.   HENT: Positive for congestion, postnasal drip, sinus pressure and sore throat. Negative for ear pain, hoarse voice, rhinorrhea, sinus pain and sneezing.    Eyes: Negative.  Negative for pain and redness.   Respiratory: Positive for cough. Negative for choking, chest tightness, shortness of breath and wheezing.    Cardiovascular: Negative.  Negative for chest pain, palpitations and leg swelling.   Gastrointestinal: Negative.  Negative for abdominal distention, abdominal pain, constipation, diarrhea, nausea and rectal pain.   Endocrine: Negative.    Genitourinary: Negative.  Negative for decreased urine volume, difficulty urinating, dysuria, flank pain, hematuria and urgency.   Musculoskeletal: Positive for neck pain. Negative for gait problem.   Skin:  "Negative.  Negative for rash and wound.   Allergic/Immunologic: Negative.    Neurological: Negative.  Negative for dizziness, syncope, weakness, light-headedness, numbness and headaches.   Hematological: Negative.    Psychiatric/Behavioral: Negative.  Negative for agitation, behavioral problems, confusion, self-injury, sleep disturbance and suicidal ideas. The patient is not nervous/anxious.      14 Point ROS completed with pertinent positives discussed. All other systems reviewed and are negative.       Objective   Encounter Vitals  /78   Pulse 78   Temp 98.3 °F (36.8 °C) (Tympanic)   Resp 20   Ht 167.6 cm (66\")   Wt 80.7 kg (178 lb)   SpO2 98%   BMI 28.73 kg/m²   Vitals:    04/23/18 1608   BP: 110/78   Pulse: 78   Resp: 20   Temp: 98.3 °F (36.8 °C)   TempSrc: Tympanic   SpO2: 98%   Weight: 80.7 kg (178 lb)   Height: 167.6 cm (66\")       Physical Exam   Constitutional: She is oriented to person, place, and time. Vital signs are normal. She appears well-developed and well-nourished. She appears ill.   HENT:   Head: Normocephalic and atraumatic.   Right Ear: Tympanic membrane, external ear and ear canal normal.   Left Ear: Tympanic membrane, external ear and ear canal normal.   Nose: Nose normal. Right sinus exhibits no maxillary sinus tenderness and no frontal sinus tenderness. Left sinus exhibits no maxillary sinus tenderness and no frontal sinus tenderness.   Mouth/Throat: Uvula is midline and mucous membranes are normal. Posterior oropharyngeal erythema present. No posterior oropharyngeal edema.   Scant clear post nasal drip   Eyes: Lids are normal. Pupils are equal, round, and reactive to light.   Neck: Trachea normal and normal range of motion. Neck supple. No thyroid mass present.   Cardiovascular: Normal rate, regular rhythm, S1 normal, S2 normal, normal heart sounds and intact distal pulses.  Exam reveals no gallop and no friction rub.    No murmur heard.  Pulmonary/Chest: Effort normal. No " respiratory distress. She has wheezes in the right upper field, the right lower field, the left upper field and the left lower field. She has rhonchi in the right lower field and the left lower field. She has no rales.   Abdominal: Soft. Normal appearance and bowel sounds are normal. She exhibits no mass. There is no rebound and no guarding.   Musculoskeletal: Normal range of motion.   Lymphadenopathy:     She has no cervical adenopathy.   Neurological: She is alert and oriented to person, place, and time. She has normal strength. No cranial nerve deficit or sensory deficit. Gait normal.   Skin: Skin is warm and dry. No rash noted.   Psychiatric: She has a normal mood and affect. Her speech is normal and behavior is normal. Judgment and thought content normal. Cognition and memory are normal.   Nursing note and vitals reviewed.      Pertinent Labs  Office Visit on 04/02/2018   Component Date Value Ref Range Status   • Glucose 04/05/2018 107* 70 - 100 mg/dL Final   • BUN 04/05/2018 10  8 - 25 mg/dL Final   • Creatinine 04/05/2018 0.60  0.40 - 1.30 mg/dL Final   • Sodium 04/05/2018 137  134 - 146 mmol/L Final   • Potassium 04/05/2018 3.7  3.4 - 5.4 mmol/L Final   • Chloride 04/05/2018 102  100 - 112 mmol/L Final   • CO2 04/05/2018 27.0  20.0 - 32.0 mmol/L Final   • Calcium 04/05/2018 9.1  8.4 - 10.8 mg/dL Final   • Total Protein 04/05/2018 7.5  6.7 - 8.2 g/dL Final   • Albumin 04/05/2018 4.20  3.20 - 5.50 g/dL Final   • ALT (SGPT) 04/05/2018 21  10 - 60 U/L Final   • AST (SGOT) 04/05/2018 21  10 - 60 U/L Final   • Alkaline Phosphatase 04/05/2018 56  15 - 121 U/L Final   • Total Bilirubin 04/05/2018 0.3  0.2 - 1.0 mg/dL Final   • eGFR Non African Amer 04/05/2018 111  55 - 135 mL/min/1.73 Final   • eGFR  African Amer 04/05/2018 134  58 - 135 mL/min/1.73 Final   • Globulin 04/05/2018 3.3  2.5 - 4.6 gm/dL Final   • A/G Ratio 04/05/2018 1.3  1.0 - 3.0 g/dL Final   • BUN/Creatinine Ratio 04/05/2018 16.7  7.0 - 25.0 Final    • Anion Gap 04/05/2018 8.0  5.0 - 15.0 mmol/L Final   • Hemoglobin A1C 04/05/2018 6.6* 4 - 5.6 % Final   • Total Cholesterol 04/05/2018 290* 150 - 200 mg/dL Final   • Triglycerides 04/05/2018 299* 35 - 160 mg/dL Final   • HDL Cholesterol 04/05/2018 31* 35 - 100 mg/dL Final   • LDL Cholesterol  04/05/2018 199  mg/dL Final   • VLDL Cholesterol 04/05/2018 59.8  mg/dL Final   • LDL/HDL Ratio 04/05/2018 6.43   Final   • TSH 04/05/2018 1.420  0.460 - 4.680 mIU/mL Final   • Free T4 04/05/2018 0.86  0.78 - 2.19 ng/dL Final   • 25 Hydroxy, Vitamin D 04/05/2018 13.4* 30.0 - 100.0 ng/ml Final   • Vitamin B-12 04/05/2018 554  239 - 931 pg/mL Final   • Urine Culture 04/06/2018 No growth at 24 hours   Final   • Insulin 04/06/2018 57.5* 2.6 - 24.9 uIU/mL Final   • Color 04/02/2018 Dark Yellow  Yellow, Straw, Dark Yellow, Maureen Final   • Clarity, UA 04/02/2018 Clear  Clear Final   • Glucose, UA 04/02/2018 Negative  Negative, 1000 mg/dL (3+) mg/dL Final   • Bilirubin 04/02/2018 Small (1+)* Negative Final   • Ketones, UA 04/02/2018 Negative  Negative Final   • Specific Gravity  04/02/2018 1.015  1.005 - 1.030 Final   • Blood, UA 04/02/2018 2+* Negative Final   • pH, Urine 04/02/2018 5.5  5.0 - 8.0 Final   • Protein, POC 04/02/2018 1+* Negative mg/dL Final   • Urobilinogen, UA 04/02/2018 Normal  Normal Final   • Leukocytes 04/02/2018 Trace* Negative Final   • Nitrite, UA 04/02/2018 Negative  Negative Final   • Color, UA 04/05/2018 Yellow  Yellow, Straw Final   • Appearance, UA 04/05/2018 Clear  Clear Final   • pH, UA 04/05/2018 5.5  5.5 - 8.0 Final   • Specific Gravity, UA 04/05/2018 >=1.030  1.005 - 1.030 Final   • Glucose, UA 04/05/2018 Negative  Negative Final   • Ketones, UA 04/05/2018 Negative  Negative Final   • Bilirubin, UA 04/05/2018 Negative  Negative Final   • Blood, UA 04/05/2018 Small (1+)* Negative Final   • Protein, UA 04/05/2018 Negative  Negative Final   • Leuk Esterase, UA 04/05/2018 Negative  Negative Final   •  Nitrite, UA 04/05/2018 Negative  Negative Final   • Urobilinogen, UA 04/05/2018 0.2 E.U./dL  0.2 - 1.0 E.U./dL Final   • RBC, UA 04/05/2018 3-5* None Seen /HPF Final   • WBC, UA 04/05/2018 0-2* None Seen /HPF Final   • Bacteria, UA 04/05/2018 Trace* None Seen /HPF Final   • Squamous Epithelial Cells, UA 04/05/2018 3-6* None Seen, 0-2 /HPF Final   • Hyaline Casts, UA 04/05/2018 None Seen  None Seen /LPF Final   • Mucus, UA 04/05/2018 Small/1+* None Seen, Trace /HPF Final   • Methodology 04/05/2018 Manual Light Microscopy   Final   • WBC 04/05/2018 12.97* 3.20 - 9.80 10*3/mm3 Final   • RBC 04/05/2018 5.12  3.77 - 5.16 10*6/mm3 Final   • Hemoglobin 04/05/2018 15.5  12.0 - 15.5 g/dL Final   • Hematocrit 04/05/2018 45.1* 35.0 - 45.0 % Final   • MCV 04/05/2018 88.1  80.0 - 98.0 fL Final   • MCH 04/05/2018 30.3  26.5 - 34.0 pg Final   • MCHC 04/05/2018 34.4  31.4 - 36.0 g/dL Final   • RDW 04/05/2018 12.5  11.5 - 14.5 % Final   • RDW-SD 04/05/2018 39.1  36.4 - 46.3 fl Final   • MPV 04/05/2018 9.2  8.0 - 12.0 fL Final   • Platelets 04/05/2018 394  150 - 450 10*3/mm3 Final   • Neutrophil % 04/05/2018 59.3  37.0 - 80.0 % Final   • Lymphocyte % 04/05/2018 32.9  10.0 - 50.0 % Final   • Monocyte % 04/05/2018 4.9  0.0 - 12.0 % Final   • Eosinophil % 04/05/2018 2.2  0.0 - 7.0 % Final   • Basophil % 04/05/2018 0.7  0.0 - 2.0 % Final   • Neutrophils, Absolute 04/05/2018 7.68  2.00 - 8.60 10*3/mm3 Final   • Lymphocytes, Absolute 04/05/2018 4.27* 0.60 - 4.20 10*3/mm3 Final   • Monocytes, Absolute 04/05/2018 0.64  0.00 - 0.90 10*3/mm3 Final   • Eosinophils, Absolute 04/05/2018 0.29  0.00 - 0.70 10*3/mm3 Final   • Basophils, Absolute 04/05/2018 0.09  0.00 - 0.20 10*3/mm3 Final     Labs have been independently reviewed    Key Imaging/Tracings/POC Testing    Assessment and Medications  Problems Addressed this Visit     None      Visit Diagnoses     Acute URI    -  Primary    Relevant Medications    cefTRIAXone (ROCEPHIN) injection 1 g  (Start on 4/23/2018  5:30 PM)    methylPREDNISolone acetate (DEPO-medrol) injection 80 mg (Start on 4/23/2018  5:30 PM)    Subacute maxillary sinusitis        Relevant Medications    cefTRIAXone (ROCEPHIN) injection 1 g (Start on 4/23/2018  5:30 PM)    methylPREDNISolone acetate (DEPO-medrol) injection 80 mg (Start on 4/23/2018  5:30 PM)        Side effects of ordered medications discussed with patient.     Plan/Additional Notes/Instructions  Plan   1. Rocephin and steroid injection today for URI  2. Ok to continue IBU for pain and fever  3. RTC if no better after completion of above ordered POC, or if symptoms worsen.  4. If no better, may call to report s/sx and if they are same as today, will call in PO ABX  5. If you experience respiratory distress, chest pain, confusion, syncope, lethargy, or feelings of impending doom, call 911 or have someone drive you to the nearest ER.    Follow-Up  Return if symptoms worsen or fail to improve.    Patient/caregiver verbalizes understanding of all orders and instructions in this plan of care.           This document has been electronically signed by LEILANI Gallagher on April 23, 2018 4:52 PM

## 2018-04-24 RX ADMIN — CEFTRIAXONE 1 G: 1 INJECTION, POWDER, FOR SOLUTION INTRAMUSCULAR; INTRAVENOUS at 16:33

## 2018-04-24 RX ADMIN — METHYLPREDNISOLONE ACETATE 80 MG: 80 INJECTION, SUSPENSION INTRA-ARTICULAR; INTRALESIONAL; INTRAMUSCULAR; SOFT TISSUE at 16:34

## 2018-04-26 RX ORDER — AMOXICILLIN AND CLAVULANATE POTASSIUM 875; 125 MG/1; MG/1
1 TABLET, FILM COATED ORAL 2 TIMES DAILY
Qty: 20 TABLET | Refills: 0 | Status: SHIPPED | OUTPATIENT
Start: 2018-04-26 | End: 2018-06-05

## 2018-05-11 DIAGNOSIS — R11.0 NAUSEA: ICD-10-CM

## 2018-05-11 RX ORDER — PROMETHAZINE HYDROCHLORIDE 25 MG/1
TABLET ORAL
Qty: 20 TABLET | Refills: 0 | Status: SHIPPED | OUTPATIENT
Start: 2018-05-11 | End: 2018-05-30 | Stop reason: SDUPTHER

## 2018-05-30 DIAGNOSIS — R11.0 NAUSEA: ICD-10-CM

## 2018-05-30 RX ORDER — PROMETHAZINE HYDROCHLORIDE 25 MG/1
TABLET ORAL
Qty: 20 TABLET | Refills: 0 | Status: SHIPPED | OUTPATIENT
Start: 2018-05-30 | End: 2018-07-02 | Stop reason: SDUPTHER

## 2018-06-05 ENCOUNTER — OFFICE VISIT (OUTPATIENT)
Dept: FAMILY MEDICINE CLINIC | Facility: CLINIC | Age: 43
End: 2018-06-05

## 2018-06-05 VITALS
DIASTOLIC BLOOD PRESSURE: 72 MMHG | WEIGHT: 163.25 LBS | HEART RATE: 84 BPM | SYSTOLIC BLOOD PRESSURE: 114 MMHG | OXYGEN SATURATION: 97 % | TEMPERATURE: 96.2 F | RESPIRATION RATE: 16 BRPM | HEIGHT: 66 IN | BODY MASS INDEX: 26.24 KG/M2

## 2018-06-05 DIAGNOSIS — R21 RASH: Primary | ICD-10-CM

## 2018-06-05 DIAGNOSIS — M25.50 ARTHRALGIA, UNSPECIFIED JOINT: ICD-10-CM

## 2018-06-05 PROCEDURE — 96372 THER/PROPH/DIAG INJ SC/IM: CPT | Performed by: NURSE PRACTITIONER

## 2018-06-05 PROCEDURE — 99214 OFFICE O/P EST MOD 30 MIN: CPT | Performed by: NURSE PRACTITIONER

## 2018-06-05 RX ORDER — METHYLPREDNISOLONE ACETATE 80 MG/ML
80 INJECTION, SUSPENSION INTRA-ARTICULAR; INTRALESIONAL; INTRAMUSCULAR; SOFT TISSUE ONCE
Status: COMPLETED | OUTPATIENT
Start: 2018-06-05 | End: 2018-06-05

## 2018-06-05 RX ORDER — KETOROLAC TROMETHAMINE 30 MG/ML
60 INJECTION, SOLUTION INTRAMUSCULAR; INTRAVENOUS ONCE
Status: COMPLETED | OUTPATIENT
Start: 2018-06-05 | End: 2018-06-05

## 2018-06-05 RX ADMIN — KETOROLAC TROMETHAMINE 60 MG: 30 INJECTION, SOLUTION INTRAMUSCULAR; INTRAVENOUS at 11:47

## 2018-06-05 RX ADMIN — METHYLPREDNISOLONE ACETATE 80 MG: 80 INJECTION, SUSPENSION INTRA-ARTICULAR; INTRALESIONAL; INTRAMUSCULAR; SOFT TISSUE at 11:48

## 2018-06-05 NOTE — PROGRESS NOTES
"Chief Complaint   Patient presents with   • Blood in Urine     Pt says \"she says she feels like she is having period cramps.\" Chronic issue with pt. States she would like a toradol shot and steroid shot       Subjective   Jeanie Jones is a 42 y.o. female presents to the office for evaluation of hematuria and sun sensitivity rash.    PMH  T2DM- unstable; worsening   Failed metformin- made sick and states it made her FBG increase   Insulin free 57.5     Chronic nausea- secondary to metformin    GERD: stable; Managed with Pepcid    Gross hematuria- worsening; followed by Dr. Starr   Next appt 04/17/18    Anxiety: stable; Managed with atarax- well controlled. Takes PRN and sometimes daily    Rash- stable; prn prednisone    Suboxone clinic: patient goes to suboxone to prevent relapsing of opioid addiction    HPI   Patient presents for re-evaluation of hematuria and rash. These are both re-current problems. Bloody urine started a few days ago. She denies UTI like symptoms- no fever or back pain. No blood clots. She is having lower abdominal cramps, but attributes this to possible constipation. 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     She was in the sun for several hours 1-2 days prior. Sun sensitivity rash has developed. She is requesting a steroid injection and toradol injection for rash and lower abd cramping.     No chest pain, palpitations, leg edema, orthopnea, and PND. No unexplained weight loss or weight gain.     HPI  Family History   Problem Relation Age of Onset   • Rheum arthritis Mother    • Cancer Mother         lung cancer   • Heart disease Mother      Social History     Social History   • Marital status: Single     Spouse name: N/A   • Number of children: 1   • Years of education: N/A     Occupational History   • Not on file.     Social History Main Topics   • Smoking status: Current Some Day Smoker     Packs/day: 1.00     Types: " Electronic Cigarette, Cigarettes   • Smokeless tobacco: Never Used      Comment: Does smoke occasionally uses ecig.    • Alcohol use No   • Drug use: No   • Sexual activity: No     Other Topics Concern   • Not on file     Social History Narrative   • No narrative on file       The following portions of the patient's history were reviewed and updated as appropriate: allergies, current medications, past family history, past medical history, past social history, past surgical history and problem list.    Review of Systems   Constitutional: Negative.  Negative for activity change, chills, fatigue, fever and unexpected weight change.   HENT: Negative.  Negative for congestion, ear pain, postnasal drip, rhinorrhea, sinus pressure and sore throat.    Eyes: Negative.  Negative for pain and redness.   Respiratory: Negative.  Negative for cough, choking, chest tightness, shortness of breath and wheezing.    Cardiovascular: Negative.  Negative for chest pain, palpitations and leg swelling.   Gastrointestinal: Positive for constipation. Negative for abdominal distention, abdominal pain, diarrhea, nausea and rectal pain.   Endocrine: Negative.    Genitourinary: Negative.  Negative for decreased urine volume, difficulty urinating, dysuria, flank pain, hematuria and urgency.   Musculoskeletal: Negative.  Negative for gait problem and neck pain.   Skin: Positive for rash. Negative for wound.   Allergic/Immunologic: Negative.    Neurological: Negative.  Negative for dizziness, syncope, weakness, light-headedness, numbness and headaches.   Hematological: Negative.    Psychiatric/Behavioral: Negative.  Negative for agitation, behavioral problems, confusion, self-injury, sleep disturbance and suicidal ideas. The patient is not nervous/anxious.      14 Point ROS completed with pertinent positives discussed. All other systems reviewed and are negative.       Objective   Encounter Vitals  /72   Pulse 84   Temp 96.2 °F (35.7 °C)  "(Tympanic)   Resp 16   Ht 167.6 cm (66\")   Wt 74 kg (163 lb 4 oz)   SpO2 97%   BMI 26.35 kg/m²   Vitals:    06/05/18 1102   BP: 114/72   Pulse: 84   Resp: 16   Temp: 96.2 °F (35.7 °C)   TempSrc: Tympanic   SpO2: 97%   Weight: 74 kg (163 lb 4 oz)   Height: 167.6 cm (66\")       Physical Exam   Constitutional: She is oriented to person, place, and time. Vital signs are normal. She appears well-developed and well-nourished.   HENT:   Head: Normocephalic and atraumatic.   Right Ear: Tympanic membrane, external ear and ear canal normal.   Left Ear: Tympanic membrane, external ear and ear canal normal.   Nose: Nose normal.   Mouth/Throat: Uvula is midline, oropharynx is clear and moist and mucous membranes are normal. No posterior oropharyngeal edema or posterior oropharyngeal erythema.   Eyes: Lids are normal. Pupils are equal, round, and reactive to light.   Neck: Trachea normal and normal range of motion. Neck supple. No thyroid mass present.   Cardiovascular: Normal rate, regular rhythm, S1 normal, S2 normal, normal heart sounds and intact distal pulses.  Exam reveals no gallop and no friction rub.    No murmur heard.  Pulmonary/Chest: Effort normal and breath sounds normal. No respiratory distress. She has no wheezes. She has no rales.   Abdominal: Soft. Normal appearance and bowel sounds are normal. She exhibits no mass. There is no rebound and no guarding.   Musculoskeletal: Normal range of motion.   Lymphadenopathy:     She has no cervical adenopathy.   Neurological: She is alert and oriented to person, place, and time. She has normal strength. No cranial nerve deficit or sensory deficit. Gait normal.   Skin: Skin is warm and dry. Rash noted.        Erythematous rash to sun exposed areas.    Psychiatric: She has a normal mood and affect. Her speech is normal and behavior is normal. Judgment and thought content normal. Cognition and memory are normal.   Nursing note and vitals reviewed.      Pertinent " Labs  Office Visit on 04/02/2018   Component Date Value Ref Range Status   • Glucose 04/05/2018 107* 70 - 100 mg/dL Final   • BUN 04/05/2018 10  8 - 25 mg/dL Final   • Creatinine 04/05/2018 0.60  0.40 - 1.30 mg/dL Final   • Sodium 04/05/2018 137  134 - 146 mmol/L Final   • Potassium 04/05/2018 3.7  3.4 - 5.4 mmol/L Final   • Chloride 04/05/2018 102  100 - 112 mmol/L Final   • CO2 04/05/2018 27.0  20.0 - 32.0 mmol/L Final   • Calcium 04/05/2018 9.1  8.4 - 10.8 mg/dL Final   • Total Protein 04/05/2018 7.5  6.7 - 8.2 g/dL Final   • Albumin 04/05/2018 4.20  3.20 - 5.50 g/dL Final   • ALT (SGPT) 04/05/2018 21  10 - 60 U/L Final   • AST (SGOT) 04/05/2018 21  10 - 60 U/L Final   • Alkaline Phosphatase 04/05/2018 56  15 - 121 U/L Final   • Total Bilirubin 04/05/2018 0.3  0.2 - 1.0 mg/dL Final   • eGFR Non African Amer 04/05/2018 111  55 - 135 mL/min/1.73 Final   • eGFR  African Amer 04/05/2018 134  58 - 135 mL/min/1.73 Final   • Globulin 04/05/2018 3.3  2.5 - 4.6 gm/dL Final   • A/G Ratio 04/05/2018 1.3  1.0 - 3.0 g/dL Final   • BUN/Creatinine Ratio 04/05/2018 16.7  7.0 - 25.0 Final   • Anion Gap 04/05/2018 8.0  5.0 - 15.0 mmol/L Final   • Hemoglobin A1C 04/05/2018 6.6* 4 - 5.6 % Final   • Total Cholesterol 04/05/2018 290* 150 - 200 mg/dL Final   • Triglycerides 04/05/2018 299* 35 - 160 mg/dL Final   • HDL Cholesterol 04/05/2018 31* 35 - 100 mg/dL Final   • LDL Cholesterol  04/05/2018 199  mg/dL Final   • VLDL Cholesterol 04/05/2018 59.8  mg/dL Final   • LDL/HDL Ratio 04/05/2018 6.43   Final   • TSH 04/05/2018 1.420  0.460 - 4.680 mIU/mL Final   • Free T4 04/05/2018 0.86  0.78 - 2.19 ng/dL Final   • 25 Hydroxy, Vitamin D 04/05/2018 13.4* 30.0 - 100.0 ng/ml Final   • Vitamin B-12 04/05/2018 554  239 - 931 pg/mL Final   • Urine Culture 04/05/2018 No growth at 24 hours   Final   • Insulin 04/05/2018 57.5* 2.6 - 24.9 uIU/mL Final   • Color 04/02/2018 Dark Yellow  Yellow, Straw, Dark Yellow, Maureen Final   • Clarity, UA  04/02/2018 Clear  Clear Final   • Glucose, UA 04/02/2018 Negative  Negative, 1000 mg/dL (3+) mg/dL Final   • Bilirubin 04/02/2018 Small (1+)* Negative Final   • Ketones, UA 04/02/2018 Negative  Negative Final   • Specific Gravity  04/02/2018 1.015  1.005 - 1.030 Final   • Blood, UA 04/02/2018 2+* Negative Final   • pH, Urine 04/02/2018 5.5  5.0 - 8.0 Final   • Protein, POC 04/02/2018 1+* Negative mg/dL Final   • Urobilinogen, UA 04/02/2018 Normal  Normal Final   • Leukocytes 04/02/2018 Trace* Negative Final   • Nitrite, UA 04/02/2018 Negative  Negative Final   • Color, UA 04/05/2018 Yellow  Yellow, Straw Final   • Appearance, UA 04/05/2018 Clear  Clear Final   • pH, UA 04/05/2018 5.5  5.5 - 8.0 Final   • Specific Gravity, UA 04/05/2018 >=1.030  1.005 - 1.030 Final   • Glucose, UA 04/05/2018 Negative  Negative Final   • Ketones, UA 04/05/2018 Negative  Negative Final   • Bilirubin, UA 04/05/2018 Negative  Negative Final   • Blood, UA 04/05/2018 Small (1+)* Negative Final   • Protein, UA 04/05/2018 Negative  Negative Final   • Leuk Esterase, UA 04/05/2018 Negative  Negative Final   • Nitrite, UA 04/05/2018 Negative  Negative Final   • Urobilinogen, UA 04/05/2018 0.2 E.U./dL  0.2 - 1.0 E.U./dL Final   • RBC, UA 04/05/2018 3-5* None Seen /HPF Final   • WBC, UA 04/05/2018 0-2* None Seen /HPF Final   • Bacteria, UA 04/05/2018 Trace* None Seen /HPF Final   • Squamous Epithelial Cells, UA 04/05/2018 3-6* None Seen, 0-2 /HPF Final   • Hyaline Casts, UA 04/05/2018 None Seen  None Seen /LPF Final   • Mucus, UA 04/05/2018 Small/1+* None Seen, Trace /HPF Final   • Methodology 04/05/2018 Manual Light Microscopy   Final   • WBC 04/05/2018 12.97* 3.20 - 9.80 10*3/mm3 Final   • RBC 04/05/2018 5.12  3.77 - 5.16 10*6/mm3 Final   • Hemoglobin 04/05/2018 15.5  12.0 - 15.5 g/dL Final   • Hematocrit 04/05/2018 45.1* 35.0 - 45.0 % Final   • MCV 04/05/2018 88.1  80.0 - 98.0 fL Final   • MCH 04/05/2018 30.3  26.5 - 34.0 pg Final   • MCHC  04/05/2018 34.4  31.4 - 36.0 g/dL Final   • RDW 04/05/2018 12.5  11.5 - 14.5 % Final   • RDW-SD 04/05/2018 39.1  36.4 - 46.3 fl Final   • MPV 04/05/2018 9.2  8.0 - 12.0 fL Final   • Platelets 04/05/2018 394  150 - 450 10*3/mm3 Final   • Neutrophil % 04/05/2018 59.3  37.0 - 80.0 % Final   • Lymphocyte % 04/05/2018 32.9  10.0 - 50.0 % Final   • Monocyte % 04/05/2018 4.9  0.0 - 12.0 % Final   • Eosinophil % 04/05/2018 2.2  0.0 - 7.0 % Final   • Basophil % 04/05/2018 0.7  0.0 - 2.0 % Final   • Neutrophils, Absolute 04/05/2018 7.68  2.00 - 8.60 10*3/mm3 Final   • Lymphocytes, Absolute 04/05/2018 4.27* 0.60 - 4.20 10*3/mm3 Final   • Monocytes, Absolute 04/05/2018 0.64  0.00 - 0.90 10*3/mm3 Final   • Eosinophils, Absolute 04/05/2018 0.29  0.00 - 0.70 10*3/mm3 Final   • Basophils, Absolute 04/05/2018 0.09  0.00 - 0.20 10*3/mm3 Final     Labs have been independently reviewed    Key Imaging/Tracings/POC Testing    Assessment and Medications  Problems Addressed this Visit        Nervous and Auditory    Arthralgia    Relevant Medications    methylPREDNISolone acetate (DEPO-medrol) injection 80 mg (Completed)    ketorolac (TORADOL) injection 60 mg (Completed)       Musculoskeletal and Integument    Rash - Primary    Relevant Medications    methylPREDNISolone acetate (DEPO-medrol) injection 80 mg (Completed)        Side effects of ordered medications discussed with patient.     Plan/Additional Notes/Instructions  Plan   1. depomedrol for rash  2. toradol for cramps   3. Patient refuses U/A today  4. Stay out of sun  5. Rest, increase PO water intake  6. RTC if no better after completion of above ordered POC, or if symptoms worsen.    Follow-Up  Return if symptoms worsen or fail to improve.    Patient/caregiver verbalizes understanding of all orders and instructions in this plan of care.           This document has been electronically signed by LEILANI Gallagher on June 7, 2018 8:05 AM

## 2018-06-20 ENCOUNTER — LAB (OUTPATIENT)
Dept: LAB | Facility: OTHER | Age: 43
End: 2018-06-20

## 2018-06-20 ENCOUNTER — OFFICE VISIT (OUTPATIENT)
Dept: FAMILY MEDICINE CLINIC | Facility: CLINIC | Age: 43
End: 2018-06-20

## 2018-06-20 VITALS
HEART RATE: 96 BPM | DIASTOLIC BLOOD PRESSURE: 78 MMHG | WEIGHT: 163.25 LBS | BODY MASS INDEX: 26.24 KG/M2 | OXYGEN SATURATION: 96 % | TEMPERATURE: 97.8 F | RESPIRATION RATE: 16 BRPM | HEIGHT: 66 IN | SYSTOLIC BLOOD PRESSURE: 128 MMHG

## 2018-06-20 DIAGNOSIS — M25.50 ARTHRALGIA, UNSPECIFIED JOINT: Primary | ICD-10-CM

## 2018-06-20 DIAGNOSIS — R21 RASH: ICD-10-CM

## 2018-06-20 DIAGNOSIS — N30.01 ACUTE CYSTITIS WITH HEMATURIA: ICD-10-CM

## 2018-06-20 DIAGNOSIS — R30.0 DYSURIA: ICD-10-CM

## 2018-06-20 LAB
BILIRUB BLD-MCNC: ABNORMAL MG/DL
CLARITY, POC: CLEAR
COLOR UR: ABNORMAL
GLUCOSE UR STRIP-MCNC: ABNORMAL MG/DL
KETONES UR QL: ABNORMAL
LEUKOCYTE EST, POC: ABNORMAL
NITRITE UR-MCNC: POSITIVE MG/ML
PH UR: 5 [PH] (ref 5–8)
PROT UR STRIP-MCNC: ABNORMAL MG/DL
RBC # UR STRIP: ABNORMAL /UL
SP GR UR: 1.01 (ref 1–1.03)
UROBILINOGEN UR QL: ABNORMAL

## 2018-06-20 PROCEDURE — 87086 URINE CULTURE/COLONY COUNT: CPT | Performed by: NURSE PRACTITIONER

## 2018-06-20 PROCEDURE — 96372 THER/PROPH/DIAG INJ SC/IM: CPT | Performed by: NURSE PRACTITIONER

## 2018-06-20 PROCEDURE — 99214 OFFICE O/P EST MOD 30 MIN: CPT | Performed by: NURSE PRACTITIONER

## 2018-06-20 PROCEDURE — 81002 URINALYSIS NONAUTO W/O SCOPE: CPT | Performed by: NURSE PRACTITIONER

## 2018-06-20 RX ORDER — CEFTRIAXONE 1 G/1
1 INJECTION, POWDER, FOR SOLUTION INTRAMUSCULAR; INTRAVENOUS ONCE
Status: COMPLETED | OUTPATIENT
Start: 2018-06-20 | End: 2018-06-20

## 2018-06-20 RX ORDER — NITROFURANTOIN 25; 75 MG/1; MG/1
100 CAPSULE ORAL 2 TIMES DAILY
Qty: 20 CAPSULE | Refills: 0 | Status: SHIPPED | OUTPATIENT
Start: 2018-06-20 | End: 2020-03-20

## 2018-06-20 RX ORDER — PREDNISONE 1 MG/1
5 TABLET ORAL AS NEEDED
Qty: 30 TABLET | Refills: 0 | Status: SHIPPED | OUTPATIENT
Start: 2018-06-20 | End: 2020-03-20

## 2018-06-20 RX ADMIN — CEFTRIAXONE 1 G: 1 INJECTION, POWDER, FOR SOLUTION INTRAMUSCULAR; INTRAVENOUS at 17:00

## 2018-06-20 NOTE — PROGRESS NOTES
"Chief Complaint   Patient presents with   • itching on arms     Pt states \"she is itching from her arms on up to shoulders since last visit, went away but came back.\"       Subjective   Jeanie Jones is a 42 y.o. female presents to the office for evaluation of rash to BUE and face, and UTI like symptoms.    PMH  T2DM- unstable; worsening   Failed metformin- made sick and states it made her FBG increase   Insulin free 57.5     Chronic nausea- secondary to metformin    GERD: stable; Managed with Pepcid    Gross hematuria- worsening; followed by Dr. Starr   Next appt 04/17/18    Anxiety: stable; Managed with atarax- well controlled. Takes PRN and sometimes daily    Rash- stable; prn prednisone    Suboxone clinic: patient goes to suboxone to prevent relapsing of opioid addiction    HPI   Patient presents for evaluation of itchy, red rash to BUE and face X 3-4 days. She was outside with her daughter a few days ago and skin was exposed to the sun. This is a recurrent problem. She has been out of daily prednisone for 4-5 weeks and attributes the flare to this. She is requesting a refill of 5 mg prednisone today. She was previously referred to rheumatology for suspected autoimmune disorder, but missed her appt due to mothers abrupt decline in health. She would like to be re-referred today.     She also c/u bilateral flank pain, hematuria, and dysuria X 3-4 days. She has been achy all over and would like to be tested for UTI today. No fever. Associated symptoms include urgency, frequency, and hesitancy. She has been taking otc AZO with mild relief of pain.       HPI  Family History   Problem Relation Age of Onset   • Rheum arthritis Mother    • Cancer Mother         lung cancer   • Heart disease Mother      Social History     Social History   • Marital status: Single     Spouse name: N/A   • Number of children: 1   • Years of education: N/A     Occupational History   • Not on file.     Social History Main Topics   • " Smoking status: Current Some Day Smoker     Packs/day: 1.00     Types: Electronic Cigarette, Cigarettes   • Smokeless tobacco: Never Used      Comment: Does smoke occasionally uses ecig.    • Alcohol use No   • Drug use: No   • Sexual activity: No     Other Topics Concern   • Not on file     Social History Narrative   • No narrative on file       The following portions of the patient's history were reviewed and updated as appropriate: allergies, current medications, past family history, past medical history, past social history, past surgical history and problem list.    Review of Systems   Constitutional: Negative.  Negative for activity change, chills, fatigue, fever and unexpected weight change.   HENT: Negative.  Negative for congestion, ear pain, postnasal drip, rhinorrhea, sinus pressure and sore throat.    Eyes: Negative.  Negative for pain and redness.   Respiratory: Negative.  Negative for cough, choking, chest tightness, shortness of breath and wheezing.    Cardiovascular: Negative.  Negative for chest pain, palpitations and leg swelling.   Gastrointestinal: Negative for abdominal distention, abdominal pain, constipation, diarrhea, nausea and rectal pain.   Endocrine: Negative.    Genitourinary: Positive for flank pain, frequency, hematuria and urgency. Negative for decreased urine volume, difficulty urinating and dysuria.   Musculoskeletal: Negative for gait problem and neck pain.   Skin: Positive for rash. Negative for wound.   Allergic/Immunologic: Negative.    Neurological: Negative.  Negative for dizziness, syncope, weakness, light-headedness, numbness and headaches.   Hematological: Negative.    Psychiatric/Behavioral: Negative.  Negative for agitation, behavioral problems, confusion, self-injury, sleep disturbance and suicidal ideas. The patient is not nervous/anxious.      14 Point ROS completed with pertinent positives discussed. All other systems reviewed and are negative.       Objective  "  Encounter Vitals  /78   Pulse 96   Temp 97.8 °F (36.6 °C) (Tympanic)   Resp 16   Ht 167.6 cm (66\")   Wt 74 kg (163 lb 4 oz)   SpO2 96%   BMI 26.35 kg/m²   Vitals:    06/20/18 1549   BP: 128/78   Pulse: 96   Resp: 16   Temp: 97.8 °F (36.6 °C)   TempSrc: Tympanic   SpO2: 96%   Weight: 74 kg (163 lb 4 oz)   Height: 167.6 cm (66\")       Physical Exam   Constitutional: She is oriented to person, place, and time. Vital signs are normal. She appears well-developed and well-nourished.   HENT:   Head: Normocephalic and atraumatic.   Right Ear: Tympanic membrane, external ear and ear canal normal.   Left Ear: Tympanic membrane, external ear and ear canal normal.   Nose: Nose normal.   Mouth/Throat: Uvula is midline, oropharynx is clear and moist and mucous membranes are normal. No posterior oropharyngeal edema or posterior oropharyngeal erythema.   Eyes: Lids are normal. Pupils are equal, round, and reactive to light.   Neck: Trachea normal and normal range of motion. Neck supple. No thyroid mass present.   Cardiovascular: Normal rate, regular rhythm, S1 normal, S2 normal, normal heart sounds and intact distal pulses.  Exam reveals no gallop and no friction rub.    No murmur heard.  Pulmonary/Chest: Effort normal and breath sounds normal. No respiratory distress. She has no wheezes. She has no rales.   Abdominal: Soft. Normal appearance and bowel sounds are normal. She exhibits no mass. There is no rebound and no guarding.   Musculoskeletal: Normal range of motion.   Lymphadenopathy:     She has no cervical adenopathy.   Neurological: She is alert and oriented to person, place, and time. She has normal strength. No cranial nerve deficit or sensory deficit. Gait normal.   Skin: Skin is warm and dry. Rash noted.        Erythematous rash to sun exposed areas.    Psychiatric: She has a normal mood and affect. Her speech is normal and behavior is normal. Judgment and thought content normal. Cognition and memory are " normal.   Nursing note and vitals reviewed.      Pertinent Labs  Lab on 06/20/2018   Component Date Value Ref Range Status   • Urine Culture 06/20/2018 Culture in progress   Preliminary   Office Visit on 06/20/2018   Component Date Value Ref Range Status   • Color 06/20/2018 Dark Yellow  Yellow, Straw, Dark Yellow, Maureen Final   • Clarity, UA 06/20/2018 Clear  Clear Final   • Glucose, UA 06/20/2018 1+* Negative, 1000 mg/dL (3+) mg/dL Final   • Bilirubin 06/20/2018 3 mg/dL* Negative Final   • Ketones, UA 06/20/2018 1+* Negative Final   • Specific Gravity  06/20/2018 1.015  1.005 - 1.030 Final   • Blood, UA 06/20/2018 3+* Negative Final   • pH, Urine 06/20/2018 5.0  5.0 - 8.0 Final   • Protein, POC 06/20/2018 3+* Negative mg/dL Final   • Urobilinogen, UA 06/20/2018 8 E.U./dL * Normal Final   • Leukocytes 06/20/2018 Large (3+)* Negative Final   • Nitrite, UA 06/20/2018 Positive* Negative Final   Office Visit on 04/02/2018   Component Date Value Ref Range Status   • Glucose 04/05/2018 107* 70 - 100 mg/dL Final   • BUN 04/05/2018 10  8 - 25 mg/dL Final   • Creatinine 04/05/2018 0.60  0.40 - 1.30 mg/dL Final   • Sodium 04/05/2018 137  134 - 146 mmol/L Final   • Potassium 04/05/2018 3.7  3.4 - 5.4 mmol/L Final   • Chloride 04/05/2018 102  100 - 112 mmol/L Final   • CO2 04/05/2018 27.0  20.0 - 32.0 mmol/L Final   • Calcium 04/05/2018 9.1  8.4 - 10.8 mg/dL Final   • Total Protein 04/05/2018 7.5  6.7 - 8.2 g/dL Final   • Albumin 04/05/2018 4.20  3.20 - 5.50 g/dL Final   • ALT (SGPT) 04/05/2018 21  10 - 60 U/L Final   • AST (SGOT) 04/05/2018 21  10 - 60 U/L Final   • Alkaline Phosphatase 04/05/2018 56  15 - 121 U/L Final   • Total Bilirubin 04/05/2018 0.3  0.2 - 1.0 mg/dL Final   • eGFR Non African Amer 04/05/2018 111  55 - 135 mL/min/1.73 Final   • eGFR  African Amer 04/05/2018 134  58 - 135 mL/min/1.73 Final   • Globulin 04/05/2018 3.3  2.5 - 4.6 gm/dL Final   • A/G Ratio 04/05/2018 1.3  1.0 - 3.0 g/dL Final   •  BUN/Creatinine Ratio 04/05/2018 16.7  7.0 - 25.0 Final   • Anion Gap 04/05/2018 8.0  5.0 - 15.0 mmol/L Final   • Hemoglobin A1C 04/05/2018 6.6* 4 - 5.6 % Final   • Total Cholesterol 04/05/2018 290* 150 - 200 mg/dL Final   • Triglycerides 04/05/2018 299* 35 - 160 mg/dL Final   • HDL Cholesterol 04/05/2018 31* 35 - 100 mg/dL Final   • LDL Cholesterol  04/05/2018 199  mg/dL Final   • VLDL Cholesterol 04/05/2018 59.8  mg/dL Final   • LDL/HDL Ratio 04/05/2018 6.43   Final   • TSH 04/05/2018 1.420  0.460 - 4.680 mIU/mL Final   • Free T4 04/05/2018 0.86  0.78 - 2.19 ng/dL Final   • 25 Hydroxy, Vitamin D 04/05/2018 13.4* 30.0 - 100.0 ng/ml Final   • Vitamin B-12 04/05/2018 554  239 - 931 pg/mL Final   • Urine Culture 04/05/2018 No growth at 24 hours   Final   • Insulin 04/05/2018 57.5* 2.6 - 24.9 uIU/mL Final   • Color 04/02/2018 Dark Yellow  Yellow, Straw, Dark Yellow, Maureen Final   • Clarity, UA 04/02/2018 Clear  Clear Final   • Glucose, UA 04/02/2018 Negative  Negative, 1000 mg/dL (3+) mg/dL Final   • Bilirubin 04/02/2018 Small (1+)* Negative Final   • Ketones, UA 04/02/2018 Negative  Negative Final   • Specific Gravity  04/02/2018 1.015  1.005 - 1.030 Final   • Blood, UA 04/02/2018 2+* Negative Final   • pH, Urine 04/02/2018 5.5  5.0 - 8.0 Final   • Protein, POC 04/02/2018 1+* Negative mg/dL Final   • Urobilinogen, UA 04/02/2018 Normal  Normal Final   • Leukocytes 04/02/2018 Trace* Negative Final   • Nitrite, UA 04/02/2018 Negative  Negative Final   • Color, UA 04/05/2018 Yellow  Yellow, Straw Final   • Appearance, UA 04/05/2018 Clear  Clear Final   • pH, UA 04/05/2018 5.5  5.5 - 8.0 Final   • Specific Gravity, UA 04/05/2018 >=1.030  1.005 - 1.030 Final   • Glucose, UA 04/05/2018 Negative  Negative Final   • Ketones, UA 04/05/2018 Negative  Negative Final   • Bilirubin, UA 04/05/2018 Negative  Negative Final   • Blood, UA 04/05/2018 Small (1+)* Negative Final   • Protein, UA 04/05/2018 Negative  Negative Final   • Leuk  Esterase, UA 04/05/2018 Negative  Negative Final   • Nitrite, UA 04/05/2018 Negative  Negative Final   • Urobilinogen, UA 04/05/2018 0.2 E.U./dL  0.2 - 1.0 E.U./dL Final   • RBC, UA 04/05/2018 3-5* None Seen /HPF Final   • WBC, UA 04/05/2018 0-2* None Seen /HPF Final   • Bacteria, UA 04/05/2018 Trace* None Seen /HPF Final   • Squamous Epithelial Cells, UA 04/05/2018 3-6* None Seen, 0-2 /HPF Final   • Hyaline Casts, UA 04/05/2018 None Seen  None Seen /LPF Final   • Mucus, UA 04/05/2018 Small/1+* None Seen, Trace /HPF Final   • Methodology 04/05/2018 Manual Light Microscopy   Final   • WBC 04/05/2018 12.97* 3.20 - 9.80 10*3/mm3 Final   • RBC 04/05/2018 5.12  3.77 - 5.16 10*6/mm3 Final   • Hemoglobin 04/05/2018 15.5  12.0 - 15.5 g/dL Final   • Hematocrit 04/05/2018 45.1* 35.0 - 45.0 % Final   • MCV 04/05/2018 88.1  80.0 - 98.0 fL Final   • MCH 04/05/2018 30.3  26.5 - 34.0 pg Final   • MCHC 04/05/2018 34.4  31.4 - 36.0 g/dL Final   • RDW 04/05/2018 12.5  11.5 - 14.5 % Final   • RDW-SD 04/05/2018 39.1  36.4 - 46.3 fl Final   • MPV 04/05/2018 9.2  8.0 - 12.0 fL Final   • Platelets 04/05/2018 394  150 - 450 10*3/mm3 Final   • Neutrophil % 04/05/2018 59.3  37.0 - 80.0 % Final   • Lymphocyte % 04/05/2018 32.9  10.0 - 50.0 % Final   • Monocyte % 04/05/2018 4.9  0.0 - 12.0 % Final   • Eosinophil % 04/05/2018 2.2  0.0 - 7.0 % Final   • Basophil % 04/05/2018 0.7  0.0 - 2.0 % Final   • Neutrophils, Absolute 04/05/2018 7.68  2.00 - 8.60 10*3/mm3 Final   • Lymphocytes, Absolute 04/05/2018 4.27* 0.60 - 4.20 10*3/mm3 Final   • Monocytes, Absolute 04/05/2018 0.64  0.00 - 0.90 10*3/mm3 Final   • Eosinophils, Absolute 04/05/2018 0.29  0.00 - 0.70 10*3/mm3 Final   • Basophils, Absolute 04/05/2018 0.09  0.00 - 0.20 10*3/mm3 Final     Labs have been independently reviewed    Key Imaging/Tracings/POC Testing  U/a positive     Assessment and Medications  Problems Addressed this Visit        Nervous and Auditory    Arthralgia - Primary     Relevant Orders    Ambulatory Referral to Rheumatology (Completed)       Musculoskeletal and Integument    Rash    Relevant Medications    predniSONE (DELTASONE) 5 MG tablet    Other Relevant Orders    Ambulatory Referral to Rheumatology (Completed)      Other Visit Diagnoses     Dysuria        Relevant Orders    POCT urinalysis dipstick, manual (Completed)    Acute cystitis with hematuria        Relevant Medications    cefTRIAXone (ROCEPHIN) injection 1 g (Completed)    nitrofurantoin, macrocrystal-monohydrate, (MACROBID) 100 MG capsule    Other Relevant Orders    Urine Culture - Urine, Urine, Clean Catch (Completed)        Side effects of ordered medications discussed with patient.     Plan/Additional Notes/Instructions  Plan   1. Rocephin inj for UTI-   2. macrobid for UTI  3. Refill of prednisone today  4. Will refer back to rheumatology patient understands she can not miss this appt  5. Urine culture sent  6. F/u next week to re-test urine and re-evaluation of symptoms  7. If you experience any acute worsening of symptoms,respiratory distress, chest pain, confusion, syncope, lethargy, or feelings of impending doom, call 911 or have someone drive you to the nearest ER.    Follow-Up  Return in about 1 week (around 6/27/2018).    Patient/caregiver verbalizes understanding of all orders and instructions in this plan of care.           This document has been electronically signed by LEILANI Gallagher on June 21, 2018 8:18 AM

## 2018-06-22 LAB — BACTERIA SPEC AEROBE CULT: NORMAL

## 2018-06-27 ENCOUNTER — TELEPHONE (OUTPATIENT)
Dept: FAMILY MEDICINE CLINIC | Facility: CLINIC | Age: 43
End: 2018-06-27

## 2018-06-27 NOTE — TELEPHONE ENCOUNTER
----- Message from LEILANI Gallagher sent at 6/22/2018  1:17 PM CDT -----  No bacteria- mixed david. Please call to see how she is feeling.

## 2018-07-02 DIAGNOSIS — R11.0 NAUSEA: ICD-10-CM

## 2018-07-02 RX ORDER — PROMETHAZINE HYDROCHLORIDE 25 MG/1
TABLET ORAL
Qty: 20 TABLET | Refills: 0 | Status: SHIPPED | OUTPATIENT
Start: 2018-07-02 | End: 2018-07-24 | Stop reason: SDUPTHER

## 2018-07-02 RX ORDER — PROMETHAZINE HYDROCHLORIDE 25 MG/1
TABLET ORAL
Qty: 20 TABLET | Refills: 0 | Status: SHIPPED | OUTPATIENT
Start: 2018-07-02 | End: 2020-03-20

## 2018-07-24 DIAGNOSIS — R11.0 NAUSEA: ICD-10-CM

## 2018-07-25 RX ORDER — PROMETHAZINE HYDROCHLORIDE 25 MG/1
TABLET ORAL
Qty: 20 TABLET | Refills: 0 | Status: SHIPPED | OUTPATIENT
Start: 2018-07-25 | End: 2020-08-18

## 2020-03-11 ENCOUNTER — PREP FOR SURGERY (OUTPATIENT)
Dept: OTHER | Facility: HOSPITAL | Age: 45
End: 2020-03-11

## 2020-03-11 DIAGNOSIS — N20.0 CALCULUS OF KIDNEY: Primary | ICD-10-CM

## 2020-03-20 ENCOUNTER — APPOINTMENT (OUTPATIENT)
Dept: PREADMISSION TESTING | Facility: HOSPITAL | Age: 45
End: 2020-03-20

## 2020-03-20 VITALS
SYSTOLIC BLOOD PRESSURE: 104 MMHG | HEIGHT: 66 IN | BODY MASS INDEX: 25.71 KG/M2 | WEIGHT: 160 LBS | DIASTOLIC BLOOD PRESSURE: 70 MMHG | HEART RATE: 94 BPM | RESPIRATION RATE: 12 BRPM | OXYGEN SATURATION: 94 %

## 2020-03-20 DIAGNOSIS — N20.0 CALCULUS OF KIDNEY: ICD-10-CM

## 2020-03-20 LAB
ANION GAP SERPL CALCULATED.3IONS-SCNC: 12 MMOL/L (ref 5–15)
BILIRUB UR QL STRIP: ABNORMAL
BUN BLD-MCNC: 13 MG/DL (ref 6–20)
BUN/CREAT SERPL: 17.8 (ref 7–25)
CALCIUM SPEC-SCNC: 9.4 MG/DL (ref 8.6–10.5)
CHLORIDE SERPL-SCNC: 103 MMOL/L (ref 98–107)
CLARITY UR: ABNORMAL
CO2 SERPL-SCNC: 26 MMOL/L (ref 22–29)
COLOR UR: YELLOW
CREAT BLD-MCNC: 0.73 MG/DL (ref 0.57–1)
GFR SERPL CREATININE-BSD FRML MDRD: 106 ML/MIN/1.73
GFR SERPL CREATININE-BSD FRML MDRD: 87 ML/MIN/1.73
GLUCOSE BLD-MCNC: 95 MG/DL (ref 65–99)
GLUCOSE UR STRIP-MCNC: NEGATIVE MG/DL
HGB UR QL STRIP.AUTO: ABNORMAL
KETONES UR QL STRIP: NEGATIVE
LEUKOCYTE ESTERASE UR QL STRIP.AUTO: ABNORMAL
NITRITE UR QL STRIP: NEGATIVE
PH UR STRIP.AUTO: 5.5 [PH] (ref 5–9)
POTASSIUM BLD-SCNC: 3.8 MMOL/L (ref 3.5–5.2)
PROT UR QL STRIP: ABNORMAL
SODIUM BLD-SCNC: 141 MMOL/L (ref 136–145)
SP GR UR STRIP: 1.02 (ref 1–1.03)
UROBILINOGEN UR QL STRIP: ABNORMAL

## 2020-03-20 PROCEDURE — 36415 COLL VENOUS BLD VENIPUNCTURE: CPT

## 2020-03-20 PROCEDURE — 93005 ELECTROCARDIOGRAM TRACING: CPT

## 2020-03-20 PROCEDURE — 93010 ELECTROCARDIOGRAM REPORT: CPT | Performed by: INTERNAL MEDICINE

## 2020-03-20 PROCEDURE — 80048 BASIC METABOLIC PNL TOTAL CA: CPT | Performed by: ANESTHESIOLOGY

## 2020-03-20 PROCEDURE — 81003 URINALYSIS AUTO W/O SCOPE: CPT | Performed by: UROLOGY

## 2020-03-20 RX ORDER — LISINOPRIL 2.5 MG/1
2.5 TABLET ORAL DAILY
COMMUNITY

## 2020-03-20 RX ORDER — TIZANIDINE 4 MG/1
4 TABLET ORAL EVERY 8 HOURS PRN
COMMUNITY
End: 2023-03-24

## 2020-03-20 RX ORDER — UBIDECARENONE 50 MG
50 CAPSULE ORAL DAILY
COMMUNITY
End: 2021-04-01

## 2020-03-20 RX ORDER — SODIUM CHLORIDE 9 MG/ML
1000 INJECTION, SOLUTION INTRAVENOUS CONTINUOUS
Status: CANCELLED | OUTPATIENT
Start: 2020-03-26

## 2020-03-20 NOTE — DISCHARGE INSTRUCTIONS
[facility]  Pre-op Information and Guidelines    You will be called after 2 p.m. the day before your surgery (Friday for Monday surgery) and notified of your time for arrival and approximate surgery time.  If you have not received a call by 4P.M., please contact Same Day Surgery at [phone] of if outside [facility] call [phone].    Please Follow these Important Safety Guidelines:    • The morning of your procedure, take only the medications listed below with   A sip of water:_____________________________________________       ____ATARAX, SUBOXONE______________________    • DO NOT eat or drink anything after 12:00 midnight the night before surgery  Specific instructions concerning drinking clear liquids will be discussed during  the pre-surgery instruction call the day before your surgery.    • If you take a blood thinner (ex. Plavix, Coumadin, aspirin), ask your doctor when to stop it before surgery  STOP DATE: _________________    • Only 2 visitors are allowed in patient rooms at a time  Your visitors will be asked to wait in the lobby until the admission process is complete with the exception of a parent with a child and patients in need of special assistance.    • YOU CANNOT DRIVE YOURSELF HOME  You must be accompanied by someone who will be responsible for driving you home after surgery and for your care at home.    • DO NOT chew gum, use breath mints, hard candy, or smoke the day of surgery  • DO NOT drink alcohol for at least 24 hours before your surgery  • DO NOT wear any jewelry and remove all body piercing before coming to the hospital  • DO NOT wear make-up to the hospital  • If you are having surgery on an extremity (arm/leg/foot) remove nail polish/artificial nails on the surgical side  • Clothing, glasses, contacts, dentures, and hairpieces must be removed before surgery  • Bathe the night before or the morning of your surgery and do not use powders/lotions on  skin.

## 2020-04-16 ENCOUNTER — APPOINTMENT (OUTPATIENT)
Dept: GENERAL RADIOLOGY | Facility: HOSPITAL | Age: 45
End: 2020-04-16

## 2020-04-16 ENCOUNTER — ANESTHESIA (OUTPATIENT)
Dept: PERIOP | Facility: HOSPITAL | Age: 45
End: 2020-04-16

## 2020-04-16 ENCOUNTER — HOSPITAL ENCOUNTER (OUTPATIENT)
Facility: HOSPITAL | Age: 45
Setting detail: HOSPITAL OUTPATIENT SURGERY
Discharge: HOME | End: 2020-04-16
Attending: UROLOGY | Admitting: UROLOGY

## 2020-04-16 ENCOUNTER — ANESTHESIA EVENT (OUTPATIENT)
Dept: PERIOP | Facility: HOSPITAL | Age: 45
End: 2020-04-16

## 2020-04-16 VITALS
HEIGHT: 66 IN | TEMPERATURE: 98.2 F | DIASTOLIC BLOOD PRESSURE: 73 MMHG | OXYGEN SATURATION: 92 % | WEIGHT: 152.56 LBS | BODY MASS INDEX: 24.52 KG/M2 | RESPIRATION RATE: 18 BRPM | HEART RATE: 101 BPM | SYSTOLIC BLOOD PRESSURE: 125 MMHG

## 2020-04-16 DIAGNOSIS — N20.0 CALCULUS OF KIDNEY: ICD-10-CM

## 2020-04-16 LAB
GLUCOSE BLDC GLUCOMTR-MCNC: 96 MG/DL (ref 70–130)
GLUCOSE BLDC GLUCOMTR-MCNC: 98 MG/DL (ref 70–130)

## 2020-04-16 PROCEDURE — 25010000002 IOPAMIDOL 61 % SOLUTION: Performed by: UROLOGY

## 2020-04-16 PROCEDURE — C1769 GUIDE WIRE: HCPCS | Performed by: UROLOGY

## 2020-04-16 PROCEDURE — C2617 STENT, NON-COR, TEM W/O DEL: HCPCS | Performed by: UROLOGY

## 2020-04-16 PROCEDURE — 25010000002 CEFTRIAXONE: Performed by: UROLOGY

## 2020-04-16 PROCEDURE — 25010000002 PROPOFOL 10 MG/ML EMULSION: Performed by: NURSE ANESTHETIST, CERTIFIED REGISTERED

## 2020-04-16 PROCEDURE — 25010000002 FENTANYL CITRATE (PF) 100 MCG/2ML SOLUTION: Performed by: NURSE ANESTHETIST, CERTIFIED REGISTERED

## 2020-04-16 PROCEDURE — 82962 GLUCOSE BLOOD TEST: CPT

## 2020-04-16 PROCEDURE — 25010000002 MIDAZOLAM PER 1 MG: Performed by: NURSE ANESTHETIST, CERTIFIED REGISTERED

## 2020-04-16 PROCEDURE — C1758 CATHETER, URETERAL: HCPCS | Performed by: UROLOGY

## 2020-04-16 PROCEDURE — 74420 UROGRAPHY RTRGR +-KUB: CPT

## 2020-04-16 PROCEDURE — 25010000002 DEXAMETHASONE PER 1 MG: Performed by: NURSE ANESTHETIST, CERTIFIED REGISTERED

## 2020-04-16 PROCEDURE — 25010000002 ONDANSETRON PER 1 MG: Performed by: NURSE ANESTHETIST, CERTIFIED REGISTERED

## 2020-04-16 PROCEDURE — C1894 INTRO/SHEATH, NON-LASER: HCPCS | Performed by: UROLOGY

## 2020-04-16 DEVICE — URETERAL STENT
Type: IMPLANTABLE DEVICE | Status: FUNCTIONAL
Brand: PERCUFLEX™ PLUS

## 2020-04-16 RX ORDER — CEPHALEXIN 250 MG/1
250 CAPSULE ORAL 4 TIMES DAILY
Qty: 10 CAPSULE | Refills: 0 | Status: SHIPPED | OUTPATIENT
Start: 2020-04-16 | End: 2020-04-26

## 2020-04-16 RX ORDER — FENTANYL CITRATE 50 UG/ML
INJECTION, SOLUTION INTRAMUSCULAR; INTRAVENOUS AS NEEDED
Status: DISCONTINUED | OUTPATIENT
Start: 2020-04-16 | End: 2020-04-16 | Stop reason: SURG

## 2020-04-16 RX ORDER — LIDOCAINE HYDROCHLORIDE 20 MG/ML
INJECTION, SOLUTION INFILTRATION; PERINEURAL AS NEEDED
Status: DISCONTINUED | OUTPATIENT
Start: 2020-04-16 | End: 2020-04-16 | Stop reason: SURG

## 2020-04-16 RX ORDER — ONDANSETRON 2 MG/ML
4 INJECTION INTRAMUSCULAR; INTRAVENOUS ONCE AS NEEDED
Status: DISCONTINUED | OUTPATIENT
Start: 2020-04-16 | End: 2020-04-16 | Stop reason: HOSPADM

## 2020-04-16 RX ORDER — OXYCODONE AND ACETAMINOPHEN 7.5; 325 MG/1; MG/1
1-2 TABLET ORAL EVERY 4 HOURS PRN
Qty: 10 TABLET | Refills: 0 | Status: SHIPPED | OUTPATIENT
Start: 2020-04-16 | End: 2020-08-18

## 2020-04-16 RX ORDER — MIDAZOLAM HYDROCHLORIDE 1 MG/ML
INJECTION INTRAMUSCULAR; INTRAVENOUS AS NEEDED
Status: DISCONTINUED | OUTPATIENT
Start: 2020-04-16 | End: 2020-04-16 | Stop reason: SURG

## 2020-04-16 RX ORDER — DEXAMETHASONE SODIUM PHOSPHATE 4 MG/ML
INJECTION, SOLUTION INTRA-ARTICULAR; INTRALESIONAL; INTRAMUSCULAR; INTRAVENOUS; SOFT TISSUE AS NEEDED
Status: DISCONTINUED | OUTPATIENT
Start: 2020-04-16 | End: 2020-04-16 | Stop reason: SURG

## 2020-04-16 RX ORDER — SODIUM CHLORIDE 9 MG/ML
1000 INJECTION, SOLUTION INTRAVENOUS CONTINUOUS
Status: DISCONTINUED | OUTPATIENT
Start: 2020-04-16 | End: 2020-04-16 | Stop reason: HOSPADM

## 2020-04-16 RX ORDER — PROPOFOL 10 MG/ML
VIAL (ML) INTRAVENOUS AS NEEDED
Status: DISCONTINUED | OUTPATIENT
Start: 2020-04-16 | End: 2020-04-16 | Stop reason: SURG

## 2020-04-16 RX ORDER — ONDANSETRON 2 MG/ML
INJECTION INTRAMUSCULAR; INTRAVENOUS AS NEEDED
Status: DISCONTINUED | OUTPATIENT
Start: 2020-04-16 | End: 2020-04-16 | Stop reason: SURG

## 2020-04-16 RX ADMIN — FENTANYL CITRATE 25 MCG: 50 INJECTION, SOLUTION INTRAMUSCULAR; INTRAVENOUS at 15:55

## 2020-04-16 RX ADMIN — ONDANSETRON 4 MG: 2 INJECTION INTRAMUSCULAR; INTRAVENOUS at 16:39

## 2020-04-16 RX ADMIN — FENTANYL CITRATE 25 MCG: 50 INJECTION, SOLUTION INTRAMUSCULAR; INTRAVENOUS at 15:51

## 2020-04-16 RX ADMIN — FENTANYL CITRATE 50 MCG: 50 INJECTION, SOLUTION INTRAMUSCULAR; INTRAVENOUS at 16:22

## 2020-04-16 RX ADMIN — MIDAZOLAM HYDROCHLORIDE 2 MG: 2 INJECTION, SOLUTION INTRAMUSCULAR; INTRAVENOUS at 15:44

## 2020-04-16 RX ADMIN — PROPOFOL 120 MG: 10 INJECTION, EMULSION INTRAVENOUS at 15:48

## 2020-04-16 RX ADMIN — FENTANYL CITRATE 50 MCG: 50 INJECTION, SOLUTION INTRAMUSCULAR; INTRAVENOUS at 16:17

## 2020-04-16 RX ADMIN — DEXAMETHASONE SODIUM PHOSPHATE 4 MG: 4 INJECTION, SOLUTION INTRAMUSCULAR; INTRAVENOUS at 15:48

## 2020-04-16 RX ADMIN — CEFTRIAXONE 1 G: 1 INJECTION, POWDER, FOR SOLUTION INTRAMUSCULAR; INTRAVENOUS at 15:54

## 2020-04-16 RX ADMIN — LIDOCAINE HYDROCHLORIDE 30 MG: 20 INJECTION, SOLUTION INFILTRATION; PERINEURAL at 15:48

## 2020-04-16 RX ADMIN — SODIUM CHLORIDE 1000 ML: 9 INJECTION, SOLUTION INTRAVENOUS at 14:30

## 2020-04-16 RX ADMIN — FENTANYL CITRATE 25 MCG: 50 INJECTION, SOLUTION INTRAMUSCULAR; INTRAVENOUS at 16:08

## 2020-04-16 RX ADMIN — FENTANYL CITRATE 25 MCG: 50 INJECTION, SOLUTION INTRAMUSCULAR; INTRAVENOUS at 16:09

## 2020-04-16 NOTE — ANESTHESIA PREPROCEDURE EVALUATION
Anesthesia Evaluation     Patient summary reviewed and Nursing notes reviewed   no history of anesthetic complications:  NPO Solid Status: > 8 hours  NPO Liquid Status: > 4 hours           Airway   Mallampati: II  TM distance: >3 FB  Neck ROM: full  possible difficult intubation  Dental    (+) edentulous    Pulmonary - normal exam    breath sounds clear to auscultation  (+) a smoker Current Smoked day of surgery, COPD mild,   Cardiovascular - normal exam    ECG reviewed  Rhythm: regular  Rate: normal    (+) hypertension, hyperlipidemia,   (-) murmur    ROS comment: Normal sinus rhythm  Left axis deviation  Nonspecific intraventricular block  Abnormal ECG  No previous ECGs available     Confirmed by CIPRIANO MEDEL, B. N. ([id number]) on 3/20/2020 1:58:53 PM    Neuro/Psych  (+) psychiatric history Anxiety and Depression,     GI/Hepatic/Renal/Endo    (+)  GERD,  renal disease stones, diabetes mellitus type 2 using insulin,     Musculoskeletal     Abdominal    Substance History - negative use      Comment: Suboxone treatment.   OB/GYN negative ob/gyn ROS     Comment: History of hysterectomy.      Other   arthritis,      ROS/Med Hx Other: Rosacea.                  Anesthesia Plan    ASA 3     general     intravenous induction     Anesthetic plan, all risks, benefits, and alternatives have been provided, discussed and informed consent has been obtained with: patient.

## 2020-04-16 NOTE — ANESTHESIA POSTPROCEDURE EVALUATION
Patient: Jeanie Jones    Procedure Summary     Date:  04/16/20 Room / Location:  [facility] OR 08 / [facility] OR    Anesthesia Start:  1544 Anesthesia Stop:  1649    Procedure:  CYSTOSCOPY, RIGHT RETROGRAE, URETEROSCOPY, LASER LITHOTRIPSY, STENT PLACEMENT  LATEX ALLERGY (Right ) Diagnosis:       Calculus of kidney      (Calculus of kidney [N20.0])    Surgeon:  Bakari Starr MD Provider:  Ghanshyam Gomez MD    Anesthesia Type:  general ASA Status:  3          Anesthesia Type: general    Vitals  No vitals data found for the desired time range.          Post Anesthesia Care and Evaluation    Patient location during evaluation: PACU  Patient participation: complete - patient participated  Level of consciousness: awake and alert  Pain score: 0  Pain management: adequate  Airway patency: patent  Anesthetic complications: No anesthetic complications  PONV Status: none  Cardiovascular status: acceptable  Respiratory status: acceptable and spontaneous ventilation  Hydration status: acceptable

## 2020-04-16 NOTE — PROGRESS NOTES
LOS: 0 days     Patient Care Team:  Parth Salas APRN as PCP - General (Nurse Practitioner)  Bakari Peace MD as PCP - Claims Attributed      Subjective     Right kidney stone    Objective       Vital Signs  Temp:  [98.8 °F (37.1 °C)] 98.8 °F (37.1 °C)  Heart Rate:  [90] 90  Resp:  [18] 18  BP: (117)/(75) 117/75    Physical Exam:        General Appearance:   No acute distress     Respiratory:    UNLABORED RESPIRATIONS.     Abdomen:     SOFT.       Genitourinary:  Urine clear     Rectal:     DEFERRED       Results Review:       Imaging Results (Last 24 Hours)     ** No results found for the last 24 hours. **        Lab Results (last 24 hours)     Procedure Component Value Units Date/Time    POC Glucose Once [[id number]]  (Normal) Collected:  04/16/20 1428    Specimen:  Blood Updated:  04/16/20 1444     Glucose 98 mg/dL      Comment: : [id number] GERRY LINONMeter ID: [id number]               I reviewed the patient's new clinical results.  I reviewed the patient's new imaging results and agree with the interpretation.  I reviewed the patient's other test results and agree with the interpretation        Assessment/Plan       Calculus of kidney      Cystoscopy right retrograde ureteroscopy laser lithotripsy J stent placement risk and benefits of been discussed      Bakari Starr MD  04/16/20  15:53

## 2020-04-16 NOTE — ANESTHESIA PROCEDURE NOTES
Airway  Urgency: elective    Date/Time: 4/16/2020 3:49 PM  Airway not difficult    General Information and Staff    Patient location during procedure: OR  CRNA: Jack Rizo CRNA    Indications and Patient Condition  Indications for airway management: airway protection    Preoxygenated: yes  Mask difficulty assessment: 1 - vent by mask    Final Airway Details  Final airway type: supraglottic airway      Successful airway: I-gel  Size 4    Number of attempts at approach: 1  Assessment: lips, teeth, and gum same as pre-op and atraumatic intubation

## 2020-04-16 NOTE — OP NOTE
CYSTOSCOPY URETEROSCOPY RETROGRADE PYELOGRAM HOLMIUM LASER STENT INSERTION  Procedure Note    Jeanie Jones  4/16/2020    Pre-op Diagnosis:   Calculus of kidney [N20.0]    Post-op Diagnosis:     Post-Op Diagnosis Codes:     * Calculus of kidney [N20.0]      Procedure(s):  CYSTOSCOPY, RIGHT RETROGRAE, URETEROSCOPY, LASER LITHOTRIPSY, STENT PLACEMENT  LATEX ALLERGY    Surgeon(s):  Bakari Starr MD    Anesthesia: General    Staff:   Circulator: Lluvia Baig RN  Radiology Technologist: Belem Lynn  Scrub Person: Yoan Day  Assistant: Bing Head CSA    Estimated Blood Loss: minimal    Specimens:                None      Drains: * No LDAs found *    Findings: Right renal calculi UPJ stone    Complications: None    Indications: Same    Description of Procedure: Patient brought the operating suite placed in dorsolithotomy position.  Prepped and draped genitalia.  I put a retrograde catheter up the ureter on the right-hand side shot retrograde studies UPJ stone was noted we put 0. 3 8 guidewire past this dilated with a navigator system.  I first went up with the ureteroscope to the UPJ and with a 400 µm fiber fragmented 3 force of the UPJ stone with a holmium laser power setting 3.5.  Then I placed the flexible ureteroscope over the top of the guidewire into the renal pelvis finished the UPJ stone with a holmium laser and then went into the mid and lower pole calyces and treated those stones and fragmented well flushed out the debris.  I then remove the ureteroscope and then over the top guidewire through the cystoscope I placed a 8 x 26 double-J stent.  Fluoroscopy showed J stent was good position the patient taken recovery out of procedure well    Bakari Starr MD     Date: 4/16/2020  Time: 17:44

## 2020-08-06 ENCOUNTER — APPOINTMENT (OUTPATIENT)
Dept: WOUND CARE | Facility: HOSPITAL | Age: 45
End: 2020-08-06

## 2020-08-11 ENCOUNTER — OFFICE VISIT (OUTPATIENT)
Dept: WOUND CARE | Facility: HOSPITAL | Age: 45
End: 2020-08-11

## 2020-08-11 ENCOUNTER — OUTSIDE FACILITY SERVICE (OUTPATIENT)
Dept: PODIATRY | Facility: CLINIC | Age: 45
End: 2020-08-11

## 2020-08-11 PROCEDURE — 82962 GLUCOSE BLOOD TEST: CPT | Performed by: NURSE PRACTITIONER

## 2020-08-11 PROCEDURE — 11042 DBRDMT SUBQ TIS 1ST 20SQCM/<: CPT | Performed by: PODIATRIST

## 2020-08-11 PROCEDURE — G0463 HOSPITAL OUTPT CLINIC VISIT: HCPCS

## 2020-08-13 LAB — GLUCOSE BLDC GLUCOMTR-MCNC: 141 MG/DL (ref 70–130)

## 2020-08-18 ENCOUNTER — OFFICE VISIT (OUTPATIENT)
Dept: ENDOCRINOLOGY | Facility: CLINIC | Age: 45
End: 2020-08-18

## 2020-08-18 ENCOUNTER — LAB (OUTPATIENT)
Dept: LAB | Facility: HOSPITAL | Age: 45
End: 2020-08-18

## 2020-08-18 VITALS
HEART RATE: 122 BPM | OXYGEN SATURATION: 98 % | DIASTOLIC BLOOD PRESSURE: 74 MMHG | SYSTOLIC BLOOD PRESSURE: 101 MMHG | HEIGHT: 66 IN | WEIGHT: 157.8 LBS | BODY MASS INDEX: 25.36 KG/M2

## 2020-08-18 DIAGNOSIS — M81.6 LOCALIZED OSTEOPOROSIS, UNSPECIFIED PATHOLOGICAL FRACTURE PRESENCE: ICD-10-CM

## 2020-08-18 DIAGNOSIS — E24.2 DRUG-INDUCED CUSHING'S SYNDROME (HCC): ICD-10-CM

## 2020-08-18 DIAGNOSIS — R94.6 ABNORMAL THYROID FUNCTION TEST: ICD-10-CM

## 2020-08-18 DIAGNOSIS — R21 RASH: Primary | ICD-10-CM

## 2020-08-18 DIAGNOSIS — T38.0X5A STEROID-INDUCED DIABETES MELLITUS, INITIAL ENCOUNTER (HCC): ICD-10-CM

## 2020-08-18 DIAGNOSIS — E09.9 STEROID-INDUCED DIABETES MELLITUS, INITIAL ENCOUNTER (HCC): ICD-10-CM

## 2020-08-18 DIAGNOSIS — B37.0 THRUSH: ICD-10-CM

## 2020-08-18 DIAGNOSIS — E27.49 SECONDARY ADRENAL INSUFFICIENCY (HCC): ICD-10-CM

## 2020-08-18 DIAGNOSIS — E55.9 VITAMIN D DEFICIENCY: ICD-10-CM

## 2020-08-18 LAB
CRP SERPL-MCNC: 0.36 MG/DL (ref 0–0.5)
ERYTHROCYTE [SEDIMENTATION RATE] IN BLOOD: 2 MM/HR (ref 0–20)
T4 FREE SERPL-MCNC: <0.1 NG/DL (ref 0.93–1.7)
TSH SERPL DL<=0.05 MIU/L-ACNC: 0.82 UIU/ML (ref 0.27–4.2)
URATE SERPL-MCNC: 2.6 MG/DL (ref 2.4–5.7)

## 2020-08-18 PROCEDURE — 86140 C-REACTIVE PROTEIN: CPT | Performed by: INTERNAL MEDICINE

## 2020-08-18 PROCEDURE — 85652 RBC SED RATE AUTOMATED: CPT | Performed by: INTERNAL MEDICINE

## 2020-08-18 PROCEDURE — 36415 COLL VENOUS BLD VENIPUNCTURE: CPT | Performed by: INTERNAL MEDICINE

## 2020-08-18 PROCEDURE — 84305 ASSAY OF SOMATOMEDIN: CPT | Performed by: INTERNAL MEDICINE

## 2020-08-18 PROCEDURE — 84443 ASSAY THYROID STIM HORMONE: CPT | Performed by: INTERNAL MEDICINE

## 2020-08-18 PROCEDURE — 84439 ASSAY OF FREE THYROXINE: CPT | Performed by: INTERNAL MEDICINE

## 2020-08-18 PROCEDURE — 99204 OFFICE O/P NEW MOD 45 MIN: CPT | Performed by: INTERNAL MEDICINE

## 2020-08-18 PROCEDURE — 82308 ASSAY OF CALCITONIN: CPT | Performed by: INTERNAL MEDICINE

## 2020-08-18 PROCEDURE — 86038 ANTINUCLEAR ANTIBODIES: CPT | Performed by: INTERNAL MEDICINE

## 2020-08-18 PROCEDURE — 86376 MICROSOMAL ANTIBODY EACH: CPT | Performed by: INTERNAL MEDICINE

## 2020-08-18 PROCEDURE — 84550 ASSAY OF BLOOD/URIC ACID: CPT | Performed by: INTERNAL MEDICINE

## 2020-08-18 PROCEDURE — 84681 ASSAY OF C-PEPTIDE: CPT | Performed by: INTERNAL MEDICINE

## 2020-08-18 RX ORDER — ESCITALOPRAM OXALATE 10 MG/1
10 TABLET ORAL DAILY
COMMUNITY
End: 2021-08-11

## 2020-08-18 RX ORDER — PEN NEEDLE, DIABETIC 30 GX3/16"
1 NEEDLE, DISPOSABLE MISCELLANEOUS 4 TIMES DAILY
Qty: 120 EACH | Refills: 11 | Status: SHIPPED | OUTPATIENT
Start: 2020-08-18

## 2020-08-18 RX ORDER — INSULIN GLARGINE 100 [IU]/ML
INJECTION, SOLUTION SUBCUTANEOUS
Qty: 5 PEN | Refills: 11 | Status: SHIPPED | OUTPATIENT
Start: 2020-08-18 | End: 2021-04-01

## 2020-08-18 RX ORDER — INSULIN LISPRO 100 U/ML
INJECTION, SOLUTION SUBCUTANEOUS
Qty: 5 PEN | Refills: 11 | Status: SHIPPED | OUTPATIENT
Start: 2020-08-18 | End: 2020-08-20 | Stop reason: CLARIF

## 2020-08-18 RX ORDER — PREDNISONE 1 MG/1
30 TABLET ORAL 2 TIMES DAILY
Qty: 720 TABLET | Refills: 11 | Status: SHIPPED | OUTPATIENT
Start: 2020-08-18 | End: 2020-10-28 | Stop reason: HOSPADM

## 2020-08-18 RX ORDER — FAMOTIDINE 40 MG/1
40 TABLET, FILM COATED ORAL DAILY
COMMUNITY

## 2020-08-18 RX ORDER — FLUCONAZOLE 150 MG/1
TABLET ORAL
Qty: 2 TABLET | Refills: 6 | Status: SHIPPED | OUTPATIENT
Start: 2020-08-18 | End: 2021-10-05

## 2020-08-18 RX ORDER — DULOXETIN HYDROCHLORIDE 60 MG/1
60 CAPSULE, DELAYED RELEASE ORAL DAILY
COMMUNITY

## 2020-08-18 RX ORDER — ONDANSETRON 4 MG/1
4 TABLET, FILM COATED ORAL EVERY 8 HOURS PRN
COMMUNITY
End: 2021-03-09

## 2020-08-18 NOTE — PROGRESS NOTES
Jeanie Jones is a 45 y.o. female who presents for  evaluation of   Chief Complaint   Patient presents with   • Establish Care     steroid use due to rash        Referring provider    No referring provider defined for this encounter.    Primary Care Provider    Parth Salas APRN    45-year-old female comes for consultation regarding steroid-induced diabetes, rash, steroid use  Duration, 2 to 5 years.    Context, patient has undiagnosed rash that has been treated with high-dose prednisone.    Location  of the rash is face upper arms and legs.    Quality come not vesicular not papular, widespread affecting legs arms and face with thinning of her skin.    In regards to glucose levels they have been in the 2-300 particularly following meals.    In regards to steroid dose she is taking 60 mg a day for the past 3 years with secondary cushingoid changes.    Modifying factors include the use of steroids and Atarax    Diabetes is being treated by Ozempic and Invokana and using rapid acting insulin from [employer], Humulin R, 4 times daily with checking her blood sugars 4 times daily for the past 6 months with a range of sugar readings between       Past Medical History:   Diagnosis Date   • Anxiety    • Depression    • Diabetes mellitus (CMS/[facility])    • GERD (gastroesophageal reflux disease)     OCCASIONALLY   • Kidney stones    • Low back pain    • MRSA infection 2008   • Multiple joint pain    • Other long term (current) drug therapy    • Pancreatitis      Family History   Problem Relation Age of Onset   • Rheum arthritis Mother    • Cancer Mother         lung cancer   • Heart disease Mother      Social History     Tobacco Use   • Smoking status: Current Every Day Smoker     Packs/day: 1.00     Years: 20.00     Pack years: 20.00     Types: Cigarettes   • Smokeless tobacco: Never Used   Substance Use Topics   • Alcohol use: No   • Drug use: No         Current Outpatient Medications:   •  buprenorphine-naloxone  (SUBOXONE) 8-2 MG per SL tablet, Place 3 tablets under the tongue Daily., Disp: , Rfl: 0  •  Canagliflozin (INVOKANA PO), Take 100 mg by mouth., Disp: , Rfl:   •  coenzyme Q10 50 MG capsule capsule, Take 50 mg by mouth Daily., Disp: , Rfl:   •  DULoxetine (CYMBALTA) 60 MG capsule, Take 60 mg by mouth Daily., Disp: , Rfl:   •  escitalopram (LEXAPRO) 10 MG tablet, Take 10 mg by mouth Daily., Disp: , Rfl:   •  famotidine (PEPCID) 40 MG tablet, Take 40 mg by mouth Daily., Disp: , Rfl:   •  hydrOXYzine (ATARAX) 50 MG tablet, TAKE 1 TABLET BY MOUTH EVERY 6 (SIX) HOURS AS NEEDED FOR ITCHING OR ANXIETY. (Patient taking differently: Take 50 mg by mouth Every 6 (Six) Hours As Needed.), Disp: 60 tablet, Rfl: 3  •  lisinopril (PRINIVIL,ZESTRIL) 2.5 MG tablet, Take 2.5 mg by mouth Daily., Disp: , Rfl:   •  ondansetron (ZOFRAN) 4 MG tablet, Take 4 mg by mouth Every 8 (Eight) Hours As Needed for Nausea or Vomiting., Disp: , Rfl:   •  rosuvastatin (CRESTOR) 5 MG tablet, Take 1 tablet by mouth Every Night. (Patient taking differently: Take 20 mg by mouth Every Night.), Disp: 30 tablet, Rfl: 5  •  Semaglutide (OZEMPIC, 0.25 OR 0.5 MG/DOSE, SC), Inject  under the skin into the appropriate area as directed 1 (One) Time Per Week., Disp: , Rfl:   •  tiZANidine (ZANAFLEX) 4 MG tablet, Take 4 mg by mouth Every 8 (Eight) Hours As Needed for Muscle Spasms., Disp: , Rfl:   •  Cholecalciferol 1.25 MG (26445 UT) tablet, 50 thousand units po q 2 weeks, Disp: 2 tablet, Rfl: 11  •  fluconazole (DIFLUCAN) 150 MG tablet, Take 1 tablet day of infection and 1 tablet 3 days later, Disp: 2 tablet, Rfl: 6  •  glucagon (Glucagon Emergency) 1 MG injection, Inject 1 mg under the skin into the appropriate area as directed 1 (One) Time As Needed for Low Blood Sugar for up to 1 dose., Disp: 1 kit, Rfl: 6  •  hydrocortisone sodium succinate (Solu-CORTEF) 100 MG injection, Inject 100 mg into the appropriate muscle as directed by prescriber 1 (One) Time for 1  dose. Provide Diluent , 21Gneedle , 3ml Syringe, Disp: 1 vial, Rfl: 11  •  Insulin Glargine (BASAGLAR KWIKPEN) 100 UNIT/ML injection pen, 50 units qhs, Disp: 5 pen, Rfl: 11  •  Insulin Lispro (ADMELOG SOLOSTAR) 100 UNIT/ML injection pen, Up to 20 units with meals, Disp: 5 pen, Rfl: 11  •  Insulin Pen Needle (PEN NEEDLES) 32G X 4 MM misc, 1 each 4 (Four) Times a Day. Use 4 x daily, Dx code E11.65, Disp: 120 each, Rfl: 11  •  predniSONE (DELTASONE) 5 MG tablet, Take 6 tablets by mouth 2 (Two) Times a Day., Disp: 720 tablet, Rfl: 11    Review of Systems    Review of Systems   Constitutional: Positive for fatigue and unexpected weight change. Negative for activity change, appetite change, chills, diaphoresis and fever.   HENT: Negative for congestion, dental problem, drooling, ear discharge, ear pain, facial swelling, mouth sores, postnasal drip, rhinorrhea, sinus pressure, sore throat, tinnitus, trouble swallowing and voice change.    Eyes: Negative for photophobia, pain, discharge, redness, itching and visual disturbance.   Respiratory: Negative for apnea, cough, choking, chest tightness, shortness of breath, wheezing and stridor.    Cardiovascular: Negative for chest pain, palpitations and leg swelling.   Gastrointestinal: Negative for abdominal distention, abdominal pain, constipation, diarrhea, nausea and vomiting.   Endocrine: Negative for cold intolerance, heat intolerance, polydipsia, polyphagia and polyuria.   Genitourinary: Negative for decreased urine volume, difficulty urinating, dysuria, flank pain, frequency, hematuria and urgency.   Musculoskeletal: Positive for arthralgias and myalgias. Negative for back pain, gait problem, joint swelling, neck pain and neck stiffness.   Skin: Positive for rash. Negative for color change, pallor and wound.   Allergic/Immunologic: Negative for immunocompromised state.   Neurological: Positive for weakness. Negative for dizziness, tremors, seizures, syncope, facial  "asymmetry, speech difficulty, light-headedness, numbness and headaches.   Hematological: Negative for adenopathy.   Psychiatric/Behavioral: Negative for agitation, behavioral problems, confusion, decreased concentration, dysphoric mood, hallucinations, self-injury, sleep disturbance and suicidal ideas. The patient is not nervous/anxious and is not hyperactive.         Objective:   /74 (BP Location: Right arm, Patient Position: Sitting, Cuff Size: Large Adult)   Pulse (!) 122   Ht 167.6 cm (66\")   Wt 71.6 kg (157 lb 12.8 oz)   SpO2 98%   BMI 25.47 kg/m²     Physical Exam   Constitutional: She is oriented to person, place, and time.   Cushingoid facies with dorsal cervical adiposity   HENT:   Head: Normocephalic.   Right Ear: External ear normal.   Left Ear: External ear normal.   Nose: Nose normal.   Eyes: Conjunctivae and EOM are normal. No scleral icterus.   Neck: Normal range of motion. Neck supple. No tracheal deviation present. No thyromegaly present.   Cardiovascular: Normal rate, regular rhythm, normal heart sounds and intact distal pulses. Exam reveals no gallop and no friction rub.   No murmur heard.  Pulmonary/Chest: Effort normal and breath sounds normal. No stridor. No respiratory distress. She has no wheezes. She has no rales. She exhibits no tenderness.   Abdominal: Soft. Bowel sounds are normal. She exhibits no distension and no mass. There is no tenderness. There is no rebound and no guarding.   Musculoskeletal: Normal range of motion. She exhibits no tenderness or deformity.   Lymphadenopathy:     She has no cervical adenopathy.   Neurological: She is alert and oriented to person, place, and time. She displays normal reflexes. She exhibits normal muscle tone. Coordination normal.   Skin: No rash noted. No erythema. No pallor.   Erythematous rash affecting face in a butterfly distribution along with infection of her hands forearms arm.    Thinning of her skin   Psychiatric: She has a normal " mood and affect. Her behavior is normal. Judgment and thought content normal.       Lab Review    Results for orders placed or performed in visit on 08/11/20   POC Glucose   Result Value Ref Range    Glucose 141 (H) 70 - 130 mg/dL         Assessment/Plan       ICD-10-CM ICD-9-CM   1. Rash R21 782.1   2. Steroid-induced diabetes mellitus, initial encounter (CMS/[facility]) E09.9 249.00    T38.0X5A E980.4   3. Thrush B37.0 112.0   4. Vitamin D deficiency E55.9 268.9   5. Localized osteoporosis, unspecified pathological fracture presence M81.6 733.09   6. Abnormal thyroid function test R94.6 794.5   7. Drug-induced Cushing's syndrome (CMS/[facility]) E24.2 255.0     E980.5   8. Secondary adrenal insufficiency (CMS/[facility]) E27.49 255.41         I reviewed and summarized records from Parth Salas APRN from current year  and I reviewed / ordered labs.   From review of records :    Rash, we don't have a diagnosis, we need a diagnosis not just give prednisone    Using atarax 50 mg tid     Has seen 2 rheumatologist , told no rheumatic disease and neg MANUEL     Has seen 1 dermatologist, last in 2017 - refer back, never biopsy ? , needs another opinion     Refer to immunology      Assess thyroid , calcitonin, 5hiaa but I doubt that rash is endocrine in nature, it is the responsibility of a dermatologist to determine the etiology    -----------    Drug-induced Cushing's/adrenal insufficiency    Prednisone doing 20 mg tabs , one three times daily     Change to 5 mg tabs    Decrease to 11 per day     Every week decrease by 1 tab until you reach 10 tabs per day    If fever , nausea or vomiting , take 100 mg IM and head to ER     ------------    Diabetes , steroid induced     Before every meal check your sugar and give admelog or humalog insulin as follows    151-200: 2units  201-250: 4 units  251-300 : 6units  Above 300 : 8 units    Plus 5 to 20 units to cover the meal    The goal is to have a sugar 2 hours after eating less than 180     I will  try to  get you a sensor       basaglar , 10 units every night    Uncontrolled diabetes  Hypoglycemia and Hyperglycemia    Insertion of continuous glucose monitor to define pattern    The continuous glucose monitor that was inserted is a balta glucose monitor      Approve for  Insulin pump and or Continuous Glucose Sensor     #1  Patient has diabetes mellitus, insulin-dependent.    #2 She performs blood glucose testing at least 4 times daily and blood glucose log was brought to office with variability from .    #3  She is requiring  Basal insulin  and Prandial Insulin for a total of at least  4 injections or boluses per day and has been doing this for at least 6 months     #4 Patient tests blood sugars at least 4 times daily and makes frequent self-adjustments based on information provided by fingerstick and patient is injecting insulin at least 4 times daily. She has been doing this for more than 6 months . She tests frequently due to hypoglycemia and hyperglycemia.   She has frequent variability with activity     #5 I have personally seen patient within the past 6 months    #6 We plan on seeing her every 2-3 months for continuous adjustment of her diabetes regimen     #7 patient has hypoglycemia with episodes of unawareness.    #8 patient has day-to-day variation in her mealtime which confounds the degree of insulin dosing with multiple daily injections.    #9 patient has completed diabetes education program with us.    #10 she has demonstrated the ability to self monitor her glucose.        #11 Patient is motivated in improving  diabetes control       ---            Orders Placed This Encounter   Procedures   • DEXA Bone Density Axial     Order Specific Question:   Reason for Exam:     Answer:   osteoporosis     Order Specific Question:   Patient Pregnant     Answer:   No   • TSH   • Thyroid Peroxidase Antibody   • T4, Free   • Calcitonin   • 5 HIAA, Urine, Quantitative, 24 Hour - Urine, Clean Catch   •  Creatinine, Urine, 24 Hour - Urine, Clean Catch   • Insulin-like Growth Factor   • MANUEL With / DsDNA, RNP, Sjogrens A / B, Nieves   • Uric Acid   • C-reactive Protein   • Sedimentation Rate   • C-Peptide     Please make certain you do this lab with all other labs   • Ambulatory Referral to Dermatology     Referral Priority:   Routine     Referral Type:   Consultation     Referral Reason:   Specialty Services Required     Requested Specialty:   Dermatology     Number of Visits Requested:   1   • Ambulatory Referral to Immunology     Referral Priority:   Routine     Referral Type:   Consultation     Referral Reason:   Specialty Services Required     Requested Specialty:   Immunology     Number of Visits Requested:   1         A copy of my note was sent to Parth Salas APRN    Please see my above opinion and suggestions.           This document has been electronically signed by Raza Abdullahi MD on August 18, 2020 09:21

## 2020-08-19 ENCOUNTER — TELEPHONE (OUTPATIENT)
Dept: ENDOCRINOLOGY | Facility: CLINIC | Age: 45
End: 2020-08-19

## 2020-08-19 LAB
ANA SER QL: NEGATIVE
C PEPTIDE SERPL-MCNC: 5.4 NG/ML (ref 1.1–4.4)
THYROPEROXIDASE AB SERPL-ACNC: <9 IU/ML (ref 0–34)

## 2020-08-20 ENCOUNTER — LAB (OUTPATIENT)
Dept: LAB | Facility: HOSPITAL | Age: 45
End: 2020-08-20

## 2020-08-20 LAB
CALCIT SERPL-MCNC: <2 PG/ML (ref 0–5)
COLLECT DURATION TIME UR: 24 HRS
CREAT UR-MCNC: 36.9 MG/DL
CREATINE 24H UR-MRATE: 0.66 G/24 HR (ref 0.7–1.6)
IGF-I SERPL-MCNC: 94 NG/ML (ref 74–239)
SPECIMEN VOL 24H UR: 1800 ML

## 2020-08-20 PROCEDURE — 82570 ASSAY OF URINE CREATININE: CPT | Performed by: INTERNAL MEDICINE

## 2020-08-20 PROCEDURE — 83497 ASSAY OF 5-HIAA: CPT | Performed by: INTERNAL MEDICINE

## 2020-08-20 PROCEDURE — 81050 URINALYSIS VOLUME MEASURE: CPT | Performed by: INTERNAL MEDICINE

## 2020-08-20 RX ORDER — INSULIN LISPRO 100 [IU]/ML
INJECTION, SOLUTION INTRAVENOUS; SUBCUTANEOUS
Qty: 6 PEN | Refills: 11 | Status: SHIPPED | OUTPATIENT
Start: 2020-08-20 | End: 2021-08-23

## 2020-08-25 ENCOUNTER — APPOINTMENT (OUTPATIENT)
Dept: WOUND CARE | Facility: HOSPITAL | Age: 45
End: 2020-08-25

## 2020-08-26 ENCOUNTER — TELEPHONE (OUTPATIENT)
Dept: ENDOCRINOLOGY | Facility: CLINIC | Age: 45
End: 2020-08-26

## 2020-08-28 ENCOUNTER — OFFICE VISIT (OUTPATIENT)
Dept: ENDOCRINOLOGY | Facility: CLINIC | Age: 45
End: 2020-08-28

## 2020-08-28 VITALS
BODY MASS INDEX: 25.6 KG/M2 | WEIGHT: 159.3 LBS | SYSTOLIC BLOOD PRESSURE: 126 MMHG | OXYGEN SATURATION: 98 % | HEART RATE: 150 BPM | DIASTOLIC BLOOD PRESSURE: 58 MMHG | HEIGHT: 66 IN

## 2020-08-28 DIAGNOSIS — R21 RASH: ICD-10-CM

## 2020-08-28 DIAGNOSIS — Z79.4 TYPE 2 DIABETES MELLITUS WITH HYPERGLYCEMIA, WITH LONG-TERM CURRENT USE OF INSULIN (HCC): Primary | ICD-10-CM

## 2020-08-28 DIAGNOSIS — E11.65 TYPE 2 DIABETES MELLITUS WITH HYPERGLYCEMIA, WITH LONG-TERM CURRENT USE OF INSULIN (HCC): Primary | ICD-10-CM

## 2020-08-28 LAB
5OH-INDOLEACETATE 24H UR-MCNC: 3 MG/L
5OH-INDOLEACETATE 24H UR-MRATE: 5.4 MG/24 HR (ref 0–14.9)

## 2020-08-28 PROCEDURE — 99214 OFFICE O/P EST MOD 30 MIN: CPT | Performed by: NURSE PRACTITIONER

## 2020-08-28 PROCEDURE — 95251 CONT GLUC MNTR ANALYSIS I&R: CPT | Performed by: NURSE PRACTITIONER

## 2020-08-28 NOTE — PROGRESS NOTES
Subjective    Jeanie Jones is a 45 y.o. female. she is here today for follow-up.    History of Present Illness       IN OFFICE VISIT           Primary Care Provider     Parth Salas APRN     45-year-old female comes for follow up       Reason diabetes induced by steroid use,      Duration-- 2 years     Quality improving control     Severity moderate     Timing constant     Quantity     Lab Results   Component Value Date    HGBA1C 9.8 (H) 2020       Symptoms -- none     Alleviating factors -- compliance     Aggravating factors steroid use     Blood glucose readings     She checks 4 times daily and has been for over 90 days     Office balta     Downloaded and reviewed     Dated from  to 2020     Average bg 191    In target 51%     High 25 %     Very high 23 %     Low 1 %       ---------------------------------------------------------------------------      RASH     Has been treated with prednisone for about 5 years     Rash is on the arms and legs    It is widespread, she does have itching      alleviating factors -- steroids and atarax      Modifying factors include the use of steroids and Atarax            The following portions of the patient's history were reviewed and updated as appropriate:   Past Medical History:   Diagnosis Date   • Anxiety    • Depression    • Diabetes mellitus (CMS/HCC)    • GERD (gastroesophageal reflux disease)     OCCASIONALLY   • Kidney stones    • Low back pain    • MRSA infection    • Multiple joint pain    • Other long term (current) drug therapy    • Pancreatitis      Past Surgical History:   Procedure Laterality Date   • ADENOIDECTOMY     • APPENDECTOMY     •  SECTION     • CHOLECYSTECTOMY     • CYSTOSCOPY, URETEROSCOPY, RETROGRADE PYELOGRAM, STENT INSERTION Right 2020    Procedure: CYSTOSCOPY, RIGHT RETROGRAE, URETEROSCOPY, LASER LITHOTRIPSY, STENT PLACEMENT  LATEX ALLERGY;  Surgeon: Homer Glen, Bakari CLAYTON MD;  Location: [facility];  Service:  Urology;  Laterality: Right;   • FRACTURE SURGERY Left     LLE with plates and screws   • HYSTERECTOMY     • INJECTION OF MEDICATION  2015    Toradol (1)      • TONSILLECTOMY       Family History   Problem Relation Age of Onset   • Rheum arthritis Mother    • Cancer Mother         lung cancer   • Heart disease Mother      OB History        1    Para   0    Term   0       0    AB   0    Living   1       SAB   0    TAB   0    Ectopic   0    Molar        Multiple   0    Live Births                  Current Outpatient Medications   Medication Sig Dispense Refill   • buprenorphine-naloxone (SUBOXONE) 8-2 MG per SL tablet Place 3 tablets under the tongue Daily.  0   • Canagliflozin (INVOKANA PO) Take 100 mg by mouth.     • Cholecalciferol 1.25 MG (99248 UT) tablet 50 thousand units po q 2 weeks 2 tablet 11   • coenzyme Q10 50 MG capsule capsule Take 50 mg by mouth Daily.     • DULoxetine (CYMBALTA) 60 MG capsule Take 60 mg by mouth Daily.     • escitalopram (LEXAPRO) 10 MG tablet Take 10 mg by mouth Daily.     • famotidine (PEPCID) 40 MG tablet Take 40 mg by mouth Daily.     • fluconazole (DIFLUCAN) 150 MG tablet Take 1 tablet day of infection and 1 tablet 3 days later 2 tablet 6   • glucagon (Glucagon Emergency) 1 MG injection Inject 1 mg under the skin into the appropriate area as directed 1 (One) Time As Needed for Low Blood Sugar for up to 1 dose. 1 kit 6   • hydrOXYzine (ATARAX) 50 MG tablet TAKE 1 TABLET BY MOUTH EVERY 6 (SIX) HOURS AS NEEDED FOR ITCHING OR ANXIETY. (Patient taking differently: Take 50 mg by mouth Every 6 (Six) Hours As Needed.) 60 tablet 3   • Insulin Glargine (BASAGLAR KWIKPEN) 100 UNIT/ML injection pen 50 units qhs 5 pen 11   • Insulin Lispro, 1 Unit Dial, (HumaLOG KwikPen) 100 UNIT/ML solution pen-injector Use up to 20 units 3 times a day with meals 6 pen 11   • Insulin Pen Needle (PEN NEEDLES) 32G X 4 MM misc 1 each 4 (Four) Times a Day. Use 4 x daily, Dx code E11.65 120  each 11   • lisinopril (PRINIVIL,ZESTRIL) 2.5 MG tablet Take 2.5 mg by mouth Daily.     • ondansetron (ZOFRAN) 4 MG tablet Take 4 mg by mouth Every 8 (Eight) Hours As Needed for Nausea or Vomiting.     • predniSONE (DELTASONE) 5 MG tablet Take 6 tablets by mouth 2 (Two) Times a Day. 720 tablet 11   • rosuvastatin (CRESTOR) 5 MG tablet Take 1 tablet by mouth Every Night. (Patient taking differently: Take 20 mg by mouth Every Night.) 30 tablet 5   • tiZANidine (ZANAFLEX) 4 MG tablet Take 4 mg by mouth Every 8 (Eight) Hours As Needed for Muscle Spasms.     • Semaglutide,0.25 or 0.5MG/DOS, (Ozempic, 0.25 or 0.5 MG/DOSE,) 2 MG/1.5ML solution pen-injector Inject 0.5 mg under the skin into the appropriate area as directed 1 (One) Time Per Week. 3 pen 11     No current facility-administered medications for this visit.      Allergies   Allergen Reactions   • Adhesive Tape Unknown - Low Severity     Tares skin    • Bactrim [Sulfamethoxazole-Trimethoprim] Hives   • Ciprofloxacin Hives   • Latex Rash     Only when pt wears latex gloves      Social History     Socioeconomic History   • Marital status: Single     Spouse name: Not on file   • Number of children: 1   • Years of education: Not on file   • Highest education level: Not on file   Tobacco Use   • Smoking status: Current Every Day Smoker     Packs/day: 1.00     Years: 20.00     Pack years: 20.00     Types: Cigarettes   • Smokeless tobacco: Never Used   Substance and Sexual Activity   • Alcohol use: No   • Drug use: No   • Sexual activity: Defer       Review of Systems  Review of Systems   Constitutional: Positive for fatigue. Negative for activity change, appetite change and diaphoresis.   HENT: Negative for facial swelling, sneezing, sore throat, tinnitus, trouble swallowing and voice change.    Eyes: Negative for photophobia, pain, discharge, redness, itching and visual disturbance.   Respiratory: Negative for apnea, cough, choking, chest tightness and shortness of  "breath.    Cardiovascular: Negative for chest pain, palpitations and leg swelling.   Gastrointestinal: Negative for abdominal distention, abdominal pain, constipation, diarrhea, nausea and vomiting.   Endocrine: Negative for cold intolerance, heat intolerance, polydipsia, polyphagia and polyuria.   Genitourinary: Negative for difficulty urinating, dysuria, frequency, hematuria and urgency.   Musculoskeletal: Positive for myalgias. Negative for arthralgias, back pain, gait problem, joint swelling, neck pain and neck stiffness.   Skin: Positive for rash. Negative for color change, pallor and wound.   Neurological: Negative for dizziness, tremors, weakness, light-headedness, numbness and headaches.   Hematological: Negative for adenopathy. Does not bruise/bleed easily.   Psychiatric/Behavioral: Negative for behavioral problems, confusion and sleep disturbance.        Objective    /58   Pulse (!) 150   Ht 167.6 cm (66\")   Wt 72.3 kg (159 lb 4.8 oz)   SpO2 98%   BMI 25.71 kg/m²   Physical Exam   Constitutional: She is oriented to person, place, and time. She appears well-developed and well-nourished. No distress.   HENT:   Head: Normocephalic and atraumatic.   Right Ear: External ear normal.   Left Ear: External ear normal.   Nose: Nose normal.   Eyes: Pupils are equal, round, and reactive to light. Conjunctivae and EOM are normal.   Neck: Normal range of motion. Neck supple. No tracheal deviation present. No thyromegaly present.   Cardiovascular: Normal rate, regular rhythm and normal heart sounds.   No murmur heard.  Pulmonary/Chest: Effort normal and breath sounds normal. No respiratory distress. She has no wheezes.   Abdominal: Soft. Bowel sounds are normal. There is no tenderness. There is no rebound and no guarding.   Musculoskeletal: Normal range of motion. She exhibits no edema, tenderness or deformity.   Neurological: She is alert and oriented to person, place, and time. No cranial nerve deficit. "   Skin: Skin is warm and dry. Rash noted. There is erythema.   Erythematous rash covering bilateral arms and legs    Psychiatric: She has a normal mood and affect. Her behavior is normal. Judgment and thought content normal.       Lab Review  Glucose (mg/dL)   Date Value   03/20/2020 95   04/05/2018 107 (H)   08/22/2017 113 (H)     Sodium (mmol/L)   Date Value   03/20/2020 141   04/05/2018 137   08/22/2017 138     Potassium (mmol/L)   Date Value   03/20/2020 3.8   04/05/2018 3.7   08/22/2017 3.5     Chloride (mmol/L)   Date Value   03/20/2020 103   04/05/2018 102   08/22/2017 97 (L)     CO2 (mmol/L)   Date Value   03/20/2020 26.0   04/05/2018 27.0   08/22/2017 26.0     BUN (mg/dL)   Date Value   03/20/2020 13   04/05/2018 10   08/22/2017 18     Creatinine (mg/dL)   Date Value   03/20/2020 0.73   04/05/2018 0.60   08/22/2017 0.80     Hemoglobin A1C (%)   Date Value   07/28/2020 9.8 (H)   03/10/2020 7.0 (H)   01/16/2020 6.9 (H)     Triglycerides (mg/dL)   Date Value   07/28/2020 299 (H)   12/06/2019 156 (H)   05/13/2019 210 (H)     LDL Cholesterol  (mg/dL)   Date Value   07/28/2020 72   12/06/2019 183 (H)   05/13/2019 75       Assessment/Plan      1. Type 2 diabetes mellitus with hyperglycemia, with long-term current use of insulin (CMS/[facility])    2. Rash    .    Medications prescribed:  Outpatient Encounter Medications as of 8/28/2020   Medication Sig Dispense Refill   • buprenorphine-naloxone (SUBOXONE) 8-2 MG per SL tablet Place 3 tablets under the tongue Daily.  0   • Canagliflozin (INVOKANA PO) Take 100 mg by mouth.     • Cholecalciferol 1.25 MG (24612 UT) tablet 50 thousand units po q 2 weeks 2 tablet 11   • coenzyme Q10 50 MG capsule capsule Take 50 mg by mouth Daily.     • DULoxetine (CYMBALTA) 60 MG capsule Take 60 mg by mouth Daily.     • escitalopram (LEXAPRO) 10 MG tablet Take 10 mg by mouth Daily.     • famotidine (PEPCID) 40 MG tablet Take 40 mg by mouth Daily.     • fluconazole (DIFLUCAN) 150 MG tablet  Take 1 tablet day of infection and 1 tablet 3 days later 2 tablet 6   • glucagon (Glucagon Emergency) 1 MG injection Inject 1 mg under the skin into the appropriate area as directed 1 (One) Time As Needed for Low Blood Sugar for up to 1 dose. 1 kit 6   • hydrOXYzine (ATARAX) 50 MG tablet TAKE 1 TABLET BY MOUTH EVERY 6 (SIX) HOURS AS NEEDED FOR ITCHING OR ANXIETY. (Patient taking differently: Take 50 mg by mouth Every 6 (Six) Hours As Needed.) 60 tablet 3   • Insulin Glargine (BASAGLAR KWIKPEN) 100 UNIT/ML injection pen 50 units qhs 5 pen 11   • Insulin Lispro, 1 Unit Dial, (HumaLOG KwikPen) 100 UNIT/ML solution pen-injector Use up to 20 units 3 times a day with meals 6 pen 11   • Insulin Pen Needle (PEN NEEDLES) 32G X 4 MM misc 1 each 4 (Four) Times a Day. Use 4 x daily, Dx code E11.65 120 each 11   • lisinopril (PRINIVIL,ZESTRIL) 2.5 MG tablet Take 2.5 mg by mouth Daily.     • ondansetron (ZOFRAN) 4 MG tablet Take 4 mg by mouth Every 8 (Eight) Hours As Needed for Nausea or Vomiting.     • predniSONE (DELTASONE) 5 MG tablet Take 6 tablets by mouth 2 (Two) Times a Day. 720 tablet 11   • rosuvastatin (CRESTOR) 5 MG tablet Take 1 tablet by mouth Every Night. (Patient taking differently: Take 20 mg by mouth Every Night.) 30 tablet 5   • tiZANidine (ZANAFLEX) 4 MG tablet Take 4 mg by mouth Every 8 (Eight) Hours As Needed for Muscle Spasms.     • Semaglutide,0.25 or 0.5MG/DOS, (Ozempic, 0.25 or 0.5 MG/DOSE,) 2 MG/1.5ML solution pen-injector Inject 0.5 mg under the skin into the appropriate area as directed 1 (One) Time Per Week. 3 pen 11   • [DISCONTINUED] Semaglutide (OZEMPIC, 0.25 OR 0.5 MG/DOSE, SC) Inject  under the skin into the appropriate area as directed 1 (One) Time Per Week.       No facility-administered encounter medications on file as of 8/28/2020.        Orders placed during this encounter include:  No orders of the defined types were placed in this encounter.    Rash --- REFERRED  TO  DERMATOLOGY AND  IMMUNOLOGY      Using atarax 50 mg tid       Rash does not appear to be endocrine related     Component      Latest Ref Rng & Units 8/18/2020 8/20/2020   Creatinine, 24H       0.70 - 1.60 g/24 hr  0.66 (L)   Creatinine, Urine      mg/dL  36.9   Urine Volume      mL  1,800   Time (Hours)      hrs  24   5-HIAA, Urine      Undefined mg/L  3.0   5-HIAA, 24H Ur      0.0 - 14.9 mg/24 hr  5.4   Calcitonin      0.0 - 5.0 pg/mL <2.0    Insulin-Like Growth Factor-1      74 - 239 ng/mL 94    MANUEL Direct      Negative Negative    Uric Acid      2.4 - 5.7 mg/dL 2.6    C-Reactive Protein      0.00 - 0.50 mg/dL 0.36    Sed Rate      0 - 20 mm/hr 2    C-Peptide      1.1 - 4.4 ng/mL 5.4 (H)                 -----------      Cushing's medication induced / adrenal insufficiency           Prednisone       She was doing 20 mg tablets TID     Now on 5 mg tablets and has tapered  To 3 tablets daily          If fever , nausea or vomiting , take 100 mg IM and head to ER                ------------    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    Taking invokana 100 mg daily     Lantus --- taking 50 untis QHS     If am sugars are less than 80 decrease by 5 units           Humalog     Taking 15 up to 20 TID before each meal     Plus        151-200: 2units  201-250: 4 units  251-300 : 6units  Above 300 : 8 units          The goal is to have a sugar 2 hours after eating less than 180            She stopped Ozempic --- restart Ozempic 0.5 mg once weekly     She may need to back down Humalog to 10 units for each meal         Office balta     Downloaded and reviewed     Dated from 8-14 to 8-     Average bg 191    In target 51%     High 25 %     Very high 23 %     Low 1 %          Approve for  Insulin pump and or Continuous Glucose Sensor      #1  Patient has diabetes mellitus, insulin-dependent.     #2 She performs blood glucose testing at least 4 times daily and blood glucose log was brought to office with variability from .     #3  She is requiring   Basal insulin  and Prandial Insulin for a total of at least  4 injections or boluses per day and has been doing this for at least 6 months      #4 Patient tests blood sugars at least 4 times daily and makes frequent self-adjustments based on information provided by fingerstick and patient is injecting insulin at least 4 times daily. She has been doing this for more than 6 months . She tests frequently due to hypoglycemia and hyperglycemia.   She has frequent variability with activity      #5 I have personally seen patient within the past 6 months     #6 We plan on seeing her every 2-3 months for continuous adjustment of her diabetes regimen      #7 patient has hypoglycemia with episodes of unawareness.     #8 patient has day-to-day variation in her mealtime which confounds the degree of insulin dosing with multiple daily injections.     #9 patient has completed diabetes education program with us.     #10 she has demonstrated the ability to self monitor her glucose.         #11 Patient is motivated in improving  diabetes control         ---            4. Follow-up: Return in about 3 months (around 11/28/2020) for Recheck.              This document has been electronically signed by LEILANI Aguilar on August 28, 2020 13:56

## 2020-09-01 ENCOUNTER — APPOINTMENT (OUTPATIENT)
Dept: WOUND CARE | Facility: HOSPITAL | Age: 45
End: 2020-09-01

## 2020-09-09 DIAGNOSIS — E11.65 TYPE 2 DIABETES MELLITUS WITH HYPERGLYCEMIA, WITH LONG-TERM CURRENT USE OF INSULIN (HCC): Primary | ICD-10-CM

## 2020-09-09 DIAGNOSIS — Z79.4 TYPE 2 DIABETES MELLITUS WITH HYPERGLYCEMIA, WITH LONG-TERM CURRENT USE OF INSULIN (HCC): Primary | ICD-10-CM

## 2020-10-20 DIAGNOSIS — E11.65 TYPE 2 DIABETES MELLITUS WITH HYPERGLYCEMIA, WITH LONG-TERM CURRENT USE OF INSULIN (HCC): ICD-10-CM

## 2020-10-20 DIAGNOSIS — Z79.4 TYPE 2 DIABETES MELLITUS WITH HYPERGLYCEMIA, WITH LONG-TERM CURRENT USE OF INSULIN (HCC): ICD-10-CM

## 2020-10-21 RX ORDER — CANAGLIFLOZIN 100 MG/1
TABLET, FILM COATED ORAL
Qty: 30 TABLET | Refills: 5 | Status: SHIPPED | OUTPATIENT
Start: 2020-10-21 | End: 2021-02-24 | Stop reason: SDUPTHER

## 2020-10-24 ENCOUNTER — HOSPITAL ENCOUNTER (INPATIENT)
Facility: HOSPITAL | Age: 45
LOS: 4 days | Discharge: HOME | End: 2020-10-28
Attending: EMERGENCY MEDICINE | Admitting: SURGERY

## 2020-10-24 ENCOUNTER — APPOINTMENT (OUTPATIENT)
Dept: CT IMAGING | Facility: HOSPITAL | Age: 45
End: 2020-10-24

## 2020-10-24 ENCOUNTER — APPOINTMENT (OUTPATIENT)
Dept: GENERAL RADIOLOGY | Facility: HOSPITAL | Age: 45
End: 2020-10-24

## 2020-10-24 ENCOUNTER — ANESTHESIA (OUTPATIENT)
Dept: PERIOP | Facility: HOSPITAL | Age: 45
End: 2020-10-24

## 2020-10-24 ENCOUNTER — ANESTHESIA EVENT (OUTPATIENT)
Dept: PERIOP | Facility: HOSPITAL | Age: 45
End: 2020-10-24

## 2020-10-24 DIAGNOSIS — K66.8 PNEUMOPERITONEUM: Primary | ICD-10-CM

## 2020-10-24 DIAGNOSIS — Z74.09 IMPAIRED FUNCTIONAL MOBILITY, BALANCE, GAIT, AND ENDURANCE: ICD-10-CM

## 2020-10-24 DIAGNOSIS — N28.89 LEFT RENAL MASS: ICD-10-CM

## 2020-10-24 DIAGNOSIS — R19.8 PERFORATED VISCUS: ICD-10-CM

## 2020-10-24 DIAGNOSIS — Z74.09 IMPAIRED MOBILITY AND ACTIVITIES OF DAILY LIVING: ICD-10-CM

## 2020-10-24 DIAGNOSIS — A41.9 SEPSIS, DUE TO UNSPECIFIED ORGANISM, UNSPECIFIED WHETHER ACUTE ORGAN DYSFUNCTION PRESENT (HCC): ICD-10-CM

## 2020-10-24 DIAGNOSIS — Z78.9 IMPAIRED MOBILITY AND ACTIVITIES OF DAILY LIVING: ICD-10-CM

## 2020-10-24 LAB
A-A DO2: 28.1 MMHG
A-A DO2: ABNORMAL
ABO GROUP BLD: NORMAL
ALBUMIN SERPL-MCNC: 4.3 G/DL (ref 3.5–5.2)
ALBUMIN/GLOB SERPL: 1.7 G/DL
ALP SERPL-CCNC: 45 U/L (ref 39–117)
ALT SERPL W P-5'-P-CCNC: 16 U/L (ref 1–33)
ANION GAP SERPL CALCULATED.3IONS-SCNC: 13 MMOL/L (ref 5–15)
APTT PPP: 20.4 SECONDS (ref 20–40.3)
ARTERIAL PATENCY WRIST A: ABNORMAL
ARTERIAL PATENCY WRIST A: ABNORMAL
AST SERPL-CCNC: 12 U/L (ref 1–32)
ATMOSPHERIC PRESS: 749 MMHG
ATMOSPHERIC PRESS: 749 MMHG
BASE EXCESS BLDA CALC-SCNC: -2.8 MMOL/L (ref 0–2)
BASE EXCESS BLDA CALC-SCNC: -7.2 MMOL/L (ref 0–2)
BASOPHILS # BLD AUTO: 0.05 10*3/MM3 (ref 0–0.2)
BASOPHILS # BLD AUTO: 0.13 10*3/MM3 (ref 0–0.2)
BASOPHILS NFR BLD AUTO: 0.2 % (ref 0–1.5)
BASOPHILS NFR BLD AUTO: 0.3 % (ref 0–1.5)
BDY SITE: ABNORMAL
BDY SITE: ABNORMAL
BILIRUB SERPL-MCNC: 0.2 MG/DL (ref 0–1.2)
BILIRUB UR QL STRIP: NEGATIVE
BLD GP AB SCN SERPL QL: NEGATIVE
BUN SERPL-MCNC: 14 MG/DL (ref 6–20)
BUN/CREAT SERPL: 18.2 (ref 7–25)
CA-I BLD-MCNC: 3.71 MG/DL (ref 4.6–5.6)
CA-I BLD-MCNC: 4.75 MG/DL (ref 4.6–5.6)
CALCIUM SPEC-SCNC: 9.3 MG/DL (ref 8.6–10.5)
CHLORIDE SERPL-SCNC: 99 MMOL/L (ref 98–107)
CLARITY UR: CLEAR
CO2 SERPL-SCNC: 25 MMOL/L (ref 22–29)
COHGB MFR BLD: 2.7 % (ref 0–5)
COHGB MFR BLD: 3 % (ref 0–5)
COLOR UR: YELLOW
CREAT SERPL-MCNC: 0.77 MG/DL (ref 0.57–1)
D-DIMER, QUANTITATIVE (MAD,POW, STR): 1153 NG/ML (FEU) (ref 0–470)
D-LACTATE SERPL-SCNC: 1.3 MMOL/L (ref 0.5–2)
DEPRECATED RDW RBC AUTO: 45.4 FL (ref 37–54)
DEPRECATED RDW RBC AUTO: 45.6 FL (ref 37–54)
EOSINOPHIL # BLD AUTO: 0.01 10*3/MM3 (ref 0–0.4)
EOSINOPHIL # BLD AUTO: 0.1 10*3/MM3 (ref 0–0.4)
EOSINOPHIL NFR BLD AUTO: 0 % (ref 0.3–6.2)
EOSINOPHIL NFR BLD AUTO: 0.3 % (ref 0.3–6.2)
ERYTHROCYTE [DISTWIDTH] IN BLOOD BY AUTOMATED COUNT: 14 % (ref 12.3–15.4)
ERYTHROCYTE [DISTWIDTH] IN BLOOD BY AUTOMATED COUNT: 14.1 % (ref 12.3–15.4)
GFR SERPL CREATININE-BSD FRML MDRD: 82 ML/MIN/1.73
GFR SERPL CREATININE-BSD FRML MDRD: 99 ML/MIN/1.73
GLOBULIN UR ELPH-MCNC: 2.6 GM/DL
GLUCOSE BLDA-MCNC: 120 MMOL/L (ref 65–95)
GLUCOSE BLDA-MCNC: 137 MMOL/L (ref 65–95)
GLUCOSE SERPL-MCNC: 175 MG/DL (ref 65–99)
GLUCOSE UR STRIP-MCNC: ABNORMAL MG/DL
HCO3 BLDA-SCNC: 16.7 MMOL/L (ref 20–26)
HCO3 BLDA-SCNC: 21.4 MMOL/L (ref 20–26)
HCT VFR BLD AUTO: 39.4 % (ref 34–46.6)
HCT VFR BLD AUTO: 45 % (ref 34–46.6)
HCT VFR BLD CALC: 34.1 % (ref 38–51)
HCT VFR BLD CALC: 35.6 % (ref 38–51)
HGB BLD-MCNC: 12.9 G/DL (ref 12–15.9)
HGB BLD-MCNC: 14.7 G/DL (ref 12–15.9)
HGB BLDA-MCNC: 11.1 G/DL (ref 13.5–17.5)
HGB BLDA-MCNC: 11.6 G/DL (ref 13.5–17.5)
HGB UR QL STRIP.AUTO: NEGATIVE
HOLD SPECIMEN: NORMAL
HOLD SPECIMEN: NORMAL
IMM GRANULOCYTES # BLD AUTO: 0.27 10*3/MM3 (ref 0–0.05)
IMM GRANULOCYTES # BLD AUTO: 0.34 10*3/MM3 (ref 0–0.05)
IMM GRANULOCYTES NFR BLD AUTO: 0.9 % (ref 0–0.5)
IMM GRANULOCYTES NFR BLD AUTO: 0.9 % (ref 0–0.5)
INR PPP: 0.92 (ref 0.8–1.2)
KETONES UR QL STRIP: ABNORMAL
LEUKOCYTE ESTERASE UR QL STRIP.AUTO: NEGATIVE
LIPASE SERPL-CCNC: 43 U/L (ref 13–60)
LYMPHOCYTES # BLD AUTO: 2.35 10*3/MM3 (ref 0.7–3.1)
LYMPHOCYTES # BLD AUTO: 2.98 10*3/MM3 (ref 0.7–3.1)
LYMPHOCYTES # BLD MANUAL: 1.49 10*3/MM3 (ref 0.7–3.1)
LYMPHOCYTES NFR BLD AUTO: 7.6 % (ref 19.6–45.3)
LYMPHOCYTES NFR BLD AUTO: 8 % (ref 19.6–45.3)
LYMPHOCYTES NFR BLD MANUAL: 4 % (ref 19.6–45.3)
LYMPHOCYTES NFR BLD MANUAL: 5 % (ref 5–12)
Lab: ABNORMAL
Lab: ABNORMAL
MAGNESIUM SERPL-MCNC: 1.7 MG/DL (ref 1.6–2.6)
MCH RBC QN AUTO: 28.9 PG (ref 26.6–33)
MCH RBC QN AUTO: 29.1 PG (ref 26.6–33)
MCHC RBC AUTO-ENTMCNC: 32.7 G/DL (ref 31.5–35.7)
MCHC RBC AUTO-ENTMCNC: 32.7 G/DL (ref 31.5–35.7)
MCV RBC AUTO: 88.6 FL (ref 79–97)
MCV RBC AUTO: 88.7 FL (ref 79–97)
METAMYELOCYTES NFR BLD MANUAL: 1 % (ref 0–0)
METHGB BLD QL: 0.3 % (ref 0–3)
METHGB BLD QL: 0.5 % (ref 0–3)
MODALITY: ABNORMAL
MODALITY: ABNORMAL
MONOCYTES # BLD AUTO: 1.67 10*3/MM3 (ref 0.1–0.9)
MONOCYTES # BLD AUTO: 1.82 10*3/MM3 (ref 0.1–0.9)
MONOCYTES # BLD AUTO: 1.87 10*3/MM3 (ref 0.1–0.9)
MONOCYTES NFR BLD AUTO: 4.5 % (ref 5–12)
MONOCYTES NFR BLD AUTO: 5.9 % (ref 5–12)
NEUTROPHILS # BLD AUTO: 33.6 10*3/MM3 (ref 1.7–7)
NEUTROPHILS NFR BLD AUTO: 26.29 10*3/MM3 (ref 1.7–7)
NEUTROPHILS NFR BLD AUTO: 32.11 10*3/MM3 (ref 1.7–7)
NEUTROPHILS NFR BLD AUTO: 85.4 % (ref 42.7–76)
NEUTROPHILS NFR BLD AUTO: 86 % (ref 42.7–76)
NEUTROPHILS NFR BLD MANUAL: 78 % (ref 42.7–76)
NEUTS BAND NFR BLD MANUAL: 12 % (ref 0–5)
NITRITE UR QL STRIP: NEGATIVE
NOTE: ABNORMAL
NOTE: ABNORMAL
NRBC BLD AUTO-RTO: 0 /100 WBC (ref 0–0.2)
NRBC BLD AUTO-RTO: 0 /100 WBC (ref 0–0.2)
NT-PROBNP SERPL-MCNC: 10.2 PG/ML (ref 0–450)
OXYHGB MFR BLDV: 93.8 % (ref 94–99)
OXYHGB MFR BLDV: 95.6 % (ref 94–99)
PCO2 BLDA: 28.3 MM HG (ref 35–45)
PCO2 BLDA: 34.3 MM HG (ref 35–45)
PCO2 TEMP ADJ BLD: ABNORMAL MM[HG]
PCO2 TEMP ADJ BLD: ABNORMAL MM[HG]
PH BLDA: 7.38 PH UNITS (ref 7.35–7.45)
PH BLDA: 7.4 PH UNITS (ref 7.35–7.45)
PH UR STRIP.AUTO: 6.5 [PH] (ref 5–9)
PH, TEMP CORRECTED: ABNORMAL
PH, TEMP CORRECTED: ABNORMAL
PLAT MORPH BLD: NORMAL
PLATELET # BLD AUTO: 336 10*3/MM3 (ref 140–450)
PLATELET # BLD AUTO: 454 10*3/MM3 (ref 140–450)
PMV BLD AUTO: 8.3 FL (ref 6–12)
PMV BLD AUTO: 8.3 FL (ref 6–12)
PO2 BLDA: 124 MM HG (ref 83–108)
PO2 BLDA: 80.5 MM HG (ref 83–108)
PO2 TEMP ADJ BLD: ABNORMAL MM[HG]
PO2 TEMP ADJ BLD: ABNORMAL MM[HG]
POTASSIUM BLDA-SCNC: 2.9 MMOL/L (ref 3.4–4.5)
POTASSIUM BLDA-SCNC: 3.9 MMOL/L (ref 3.4–4.5)
POTASSIUM SERPL-SCNC: 2.6 MMOL/L (ref 3.5–5.2)
PROT SERPL-MCNC: 6.9 G/DL (ref 6–8.5)
PROT UR QL STRIP: NEGATIVE
PROTHROMBIN TIME: 12.7 SECONDS (ref 11.1–15.3)
RBC # BLD AUTO: 4.44 10*6/MM3 (ref 3.77–5.28)
RBC # BLD AUTO: 5.08 10*6/MM3 (ref 3.77–5.28)
RBC MORPH BLD: NORMAL
RH BLD: POSITIVE
SAO2 % BLDCOA: 96.7 % (ref 94–99)
SAO2 % BLDCOA: 99 % (ref 94–99)
SODIUM BLDA-SCNC: 137 MMOL/L (ref 136–146)
SODIUM BLDA-SCNC: 140 MMOL/L (ref 136–146)
SODIUM SERPL-SCNC: 137 MMOL/L (ref 136–145)
SP GR UR STRIP: 1.07 (ref 1–1.03)
T&S EXPIRATION DATE: NORMAL
TROPONIN T SERPL-MCNC: <0.01 NG/ML (ref 0–0.03)
TROPONIN T SERPL-MCNC: <0.01 NG/ML (ref 0–0.03)
UROBILINOGEN UR QL STRIP: ABNORMAL
VENTILATOR MODE: ABNORMAL
VENTILATOR MODE: ABNORMAL
WBC # BLD AUTO: 30.79 10*3/MM3 (ref 3.4–10.8)
WBC # BLD AUTO: 37.33 10*3/MM3 (ref 3.4–10.8)
WBC MORPH BLD: NORMAL
WHOLE BLOOD HOLD SPECIMEN: NORMAL
WHOLE BLOOD HOLD SPECIMEN: NORMAL

## 2020-10-24 PROCEDURE — 82375 ASSAY CARBOXYHB QUANT: CPT

## 2020-10-24 PROCEDURE — 74177 CT ABD & PELVIS W/CONTRAST: CPT

## 2020-10-24 PROCEDURE — C2627 CATH, SUPRAPUBIC/CYSTOSCOPIC: HCPCS | Performed by: SURGERY

## 2020-10-24 PROCEDURE — 0 IOPAMIDOL PER 1 ML: Performed by: EMERGENCY MEDICINE

## 2020-10-24 PROCEDURE — 93005 ELECTROCARDIOGRAM TRACING: CPT | Performed by: EMERGENCY MEDICINE

## 2020-10-24 PROCEDURE — 80053 COMPREHEN METABOLIC PANEL: CPT | Performed by: EMERGENCY MEDICINE

## 2020-10-24 PROCEDURE — 88307 TISSUE EXAM BY PATHOLOGIST: CPT

## 2020-10-24 PROCEDURE — 93010 ELECTROCARDIOGRAM REPORT: CPT | Performed by: INTERNAL MEDICINE

## 2020-10-24 PROCEDURE — 86900 BLOOD TYPING SEROLOGIC ABO: CPT | Performed by: EMERGENCY MEDICINE

## 2020-10-24 PROCEDURE — 85379 FIBRIN DEGRADATION QUANT: CPT | Performed by: EMERGENCY MEDICINE

## 2020-10-24 PROCEDURE — 86901 BLOOD TYPING SEROLOGIC RH(D): CPT

## 2020-10-24 PROCEDURE — 25010000003 POTASSIUM CHLORIDE 10 MEQ/100ML SOLUTION: Performed by: EMERGENCY MEDICINE

## 2020-10-24 PROCEDURE — 49905 OMENTAL FLAP INTRA-ABDOM: CPT | Performed by: SPECIALIST/TECHNOLOGIST, OTHER

## 2020-10-24 PROCEDURE — 25010000002 MORPHINE PER 10 MG: Performed by: EMERGENCY MEDICINE

## 2020-10-24 PROCEDURE — 83735 ASSAY OF MAGNESIUM: CPT | Performed by: SURGERY

## 2020-10-24 PROCEDURE — 81003 URINALYSIS AUTO W/O SCOPE: CPT | Performed by: EMERGENCY MEDICINE

## 2020-10-24 PROCEDURE — 85730 THROMBOPLASTIN TIME PARTIAL: CPT | Performed by: EMERGENCY MEDICINE

## 2020-10-24 PROCEDURE — 86901 BLOOD TYPING SEROLOGIC RH(D): CPT | Performed by: EMERGENCY MEDICINE

## 2020-10-24 PROCEDURE — 49905 OMENTAL FLAP INTRA-ABDOM: CPT | Performed by: SURGERY

## 2020-10-24 PROCEDURE — 71045 X-RAY EXAM CHEST 1 VIEW: CPT

## 2020-10-24 PROCEDURE — 83690 ASSAY OF LIPASE: CPT | Performed by: EMERGENCY MEDICINE

## 2020-10-24 PROCEDURE — 82805 BLOOD GASES W/O2 SATURATION: CPT

## 2020-10-24 PROCEDURE — 84484 ASSAY OF TROPONIN QUANT: CPT | Performed by: EMERGENCY MEDICINE

## 2020-10-24 PROCEDURE — 85610 PROTHROMBIN TIME: CPT

## 2020-10-24 PROCEDURE — 86850 RBC ANTIBODY SCREEN: CPT | Performed by: EMERGENCY MEDICINE

## 2020-10-24 PROCEDURE — 86900 BLOOD TYPING SEROLOGIC ABO: CPT

## 2020-10-24 PROCEDURE — 51702 INSERT TEMP BLADDER CATH: CPT

## 2020-10-24 PROCEDURE — 71275 CT ANGIOGRAPHY CHEST: CPT

## 2020-10-24 PROCEDURE — 83050 HGB METHEMOGLOBIN QUAN: CPT

## 2020-10-24 PROCEDURE — 83880 ASSAY OF NATRIURETIC PEPTIDE: CPT | Performed by: EMERGENCY MEDICINE

## 2020-10-24 PROCEDURE — 87635 SARS-COV-2 COVID-19 AMP PRB: CPT | Performed by: EMERGENCY MEDICINE

## 2020-10-24 PROCEDURE — 25010000002 HYDROMORPHONE 1 MG/ML SOLUTION: Performed by: SURGERY

## 2020-10-24 PROCEDURE — 25010000002 PHENYLEPHRINE PER 1 ML: Performed by: NURSE ANESTHETIST, CERTIFIED REGISTERED

## 2020-10-24 PROCEDURE — 94002 VENT MGMT INPAT INIT DAY: CPT

## 2020-10-24 PROCEDURE — 93005 ELECTROCARDIOGRAM TRACING: CPT

## 2020-10-24 PROCEDURE — 25010000002 PROPOFOL 10 MG/ML EMULSION

## 2020-10-24 PROCEDURE — 25010000002 FENTANYL CITRATE (PF) 100 MCG/2ML SOLUTION: Performed by: EMERGENCY MEDICINE

## 2020-10-24 PROCEDURE — 86923 COMPATIBILITY TEST ELECTRIC: CPT

## 2020-10-24 PROCEDURE — 99223 1ST HOSP IP/OBS HIGH 75: CPT | Performed by: SURGERY

## 2020-10-24 PROCEDURE — 25010000002 HYDROMORPHONE PER 4 MG: Performed by: SURGERY

## 2020-10-24 PROCEDURE — 83605 ASSAY OF LACTIC ACID: CPT | Performed by: EMERGENCY MEDICINE

## 2020-10-24 PROCEDURE — P9612 CATHETERIZE FOR URINE SPEC: HCPCS

## 2020-10-24 PROCEDURE — 25010000002 ONDANSETRON PER 1 MG

## 2020-10-24 PROCEDURE — 87040 BLOOD CULTURE FOR BACTERIA: CPT | Performed by: EMERGENCY MEDICINE

## 2020-10-24 PROCEDURE — 84100 ASSAY OF PHOSPHORUS: CPT | Performed by: SURGERY

## 2020-10-24 PROCEDURE — 25010000002 PIPERACILLIN SOD-TAZOBACTAM PER 1 G: Performed by: EMERGENCY MEDICINE

## 2020-10-24 PROCEDURE — 82803 BLOOD GASES ANY COMBINATION: CPT

## 2020-10-24 PROCEDURE — 43840 GSTRRPHY SUTR DUOL/GSTR ULCR: CPT | Performed by: SURGERY

## 2020-10-24 PROCEDURE — 25010000002 VANCOMYCIN: Performed by: EMERGENCY MEDICINE

## 2020-10-24 PROCEDURE — 85025 COMPLETE CBC W/AUTO DIFF WBC: CPT | Performed by: SURGERY

## 2020-10-24 PROCEDURE — 83735 ASSAY OF MAGNESIUM: CPT | Performed by: EMERGENCY MEDICINE

## 2020-10-24 PROCEDURE — 94799 UNLISTED PULMONARY SVC/PX: CPT

## 2020-10-24 PROCEDURE — 43840 GSTRRPHY SUTR DUOL/GSTR ULCR: CPT | Performed by: SPECIALIST/TECHNOLOGIST, OTHER

## 2020-10-24 PROCEDURE — 25010000002 SUCCINYLCHOLINE PER 20 MG: Performed by: NURSE ANESTHETIST, CERTIFIED REGISTERED

## 2020-10-24 PROCEDURE — 25010000002 FENTANYL CITRATE (PF) 100 MCG/2ML SOLUTION: Performed by: NURSE ANESTHETIST, CERTIFIED REGISTERED

## 2020-10-24 PROCEDURE — 85025 COMPLETE CBC W/AUTO DIFF WBC: CPT

## 2020-10-24 PROCEDURE — 99285 EMERGENCY DEPT VISIT HI MDM: CPT

## 2020-10-24 PROCEDURE — 25010000002 MAGNESIUM SULFATE IN D5W 1G/100ML (PREMIX) 1-5 GM/100ML-% SOLUTION: Performed by: EMERGENCY MEDICINE

## 2020-10-24 PROCEDURE — 25010000002 MAGNESIUM SULFATE PER 500 MG OF MAGNESIUM: Performed by: NURSE ANESTHETIST, CERTIFIED REGISTERED

## 2020-10-24 PROCEDURE — 25010000002 MIDAZOLAM PER 1 MG: Performed by: NURSE ANESTHETIST, CERTIFIED REGISTERED

## 2020-10-24 PROCEDURE — 85007 BL SMEAR W/DIFF WBC COUNT: CPT | Performed by: EMERGENCY MEDICINE

## 2020-10-24 PROCEDURE — 25010000002 PROPOFOL 10 MG/ML EMULSION: Performed by: NURSE ANESTHETIST, CERTIFIED REGISTERED

## 2020-10-24 RX ORDER — LIDOCAINE HYDROCHLORIDE 20 MG/ML
INJECTION, SOLUTION INFILTRATION; PERINEURAL AS NEEDED
Status: DISCONTINUED | OUTPATIENT
Start: 2020-10-24 | End: 2020-10-24 | Stop reason: SURG

## 2020-10-24 RX ORDER — HYDROMORPHONE HCL IN 0.9% NACL 0.2 MG/ML
PLASTIC BAG, INJECTION (ML) INTRAVENOUS CONTINUOUS
Status: DISPENSED | OUTPATIENT
Start: 2020-10-24 | End: 2020-10-27

## 2020-10-24 RX ORDER — ALBUTEROL SULFATE 2.5 MG/3ML
2.5 SOLUTION RESPIRATORY (INHALATION)
Status: DISCONTINUED | OUTPATIENT
Start: 2020-10-25 | End: 2020-10-28 | Stop reason: HOSPADM

## 2020-10-24 RX ORDER — SODIUM CHLORIDE 0.9 % (FLUSH) 0.9 %
3 SYRINGE (ML) INJECTION EVERY 12 HOURS SCHEDULED
Status: DISCONTINUED | OUTPATIENT
Start: 2020-10-24 | End: 2020-10-24 | Stop reason: HOSPADM

## 2020-10-24 RX ORDER — NALOXONE HCL 0.4 MG/ML
0.1 VIAL (ML) INJECTION
Status: DISCONTINUED | OUTPATIENT
Start: 2020-10-24 | End: 2020-10-26

## 2020-10-24 RX ORDER — BUPIVACAINE HYDROCHLORIDE AND EPINEPHRINE 2.5; 5 MG/ML; UG/ML
INJECTION, SOLUTION EPIDURAL; INFILTRATION; INTRACAUDAL; PERINEURAL AS NEEDED
Status: DISCONTINUED | OUTPATIENT
Start: 2020-10-24 | End: 2020-10-24 | Stop reason: HOSPADM

## 2020-10-24 RX ORDER — ONDANSETRON 2 MG/ML
4 INJECTION INTRAMUSCULAR; INTRAVENOUS ONCE
Status: COMPLETED | OUTPATIENT
Start: 2020-10-24 | End: 2020-10-24

## 2020-10-24 RX ORDER — NICOTINE POLACRILEX 4 MG
15 LOZENGE BUCCAL
Status: DISCONTINUED | OUTPATIENT
Start: 2020-10-24 | End: 2020-10-28 | Stop reason: HOSPADM

## 2020-10-24 RX ORDER — SODIUM CHLORIDE 0.9 % (FLUSH) 0.9 %
10 SYRINGE (ML) INJECTION EVERY 12 HOURS SCHEDULED
Status: DISCONTINUED | OUTPATIENT
Start: 2020-10-24 | End: 2020-10-28 | Stop reason: HOSPADM

## 2020-10-24 RX ORDER — SODIUM CHLORIDE 0.9 % (FLUSH) 0.9 %
10 SYRINGE (ML) INJECTION AS NEEDED
Status: DISCONTINUED | OUTPATIENT
Start: 2020-10-24 | End: 2020-10-24 | Stop reason: HOSPADM

## 2020-10-24 RX ORDER — SODIUM CHLORIDE, SODIUM LACTATE, POTASSIUM CHLORIDE, CALCIUM CHLORIDE 600; 310; 30; 20 MG/100ML; MG/100ML; MG/100ML; MG/100ML
INJECTION, SOLUTION INTRAVENOUS CONTINUOUS PRN
Status: DISCONTINUED | OUTPATIENT
Start: 2020-10-24 | End: 2020-10-24 | Stop reason: SURG

## 2020-10-24 RX ORDER — PROPOFOL 10 MG/ML
VIAL (ML) INTRAVENOUS AS NEEDED
Status: DISCONTINUED | OUTPATIENT
Start: 2020-10-24 | End: 2020-10-24 | Stop reason: SURG

## 2020-10-24 RX ORDER — PANTOPRAZOLE SODIUM 40 MG/10ML
40 INJECTION, POWDER, LYOPHILIZED, FOR SOLUTION INTRAVENOUS ONCE
Status: COMPLETED | OUTPATIENT
Start: 2020-10-24 | End: 2020-10-24

## 2020-10-24 RX ORDER — PROMETHAZINE HYDROCHLORIDE 25 MG/1
25 SUPPOSITORY RECTAL ONCE AS NEEDED
Status: DISCONTINUED | OUTPATIENT
Start: 2020-10-24 | End: 2020-10-24

## 2020-10-24 RX ORDER — NALOXONE HCL 0.4 MG/ML
0.1 VIAL (ML) INJECTION
Status: DISCONTINUED | OUTPATIENT
Start: 2020-10-24 | End: 2020-10-28 | Stop reason: HOSPADM

## 2020-10-24 RX ORDER — SODIUM CHLORIDE 9 MG/ML
125 INJECTION, SOLUTION INTRAVENOUS CONTINUOUS
Status: DISCONTINUED | OUTPATIENT
Start: 2020-10-24 | End: 2020-10-24 | Stop reason: HOSPADM

## 2020-10-24 RX ORDER — MAGNESIUM SULFATE HEPTAHYDRATE 500 MG/ML
INJECTION, SOLUTION INTRAMUSCULAR; INTRAVENOUS AS NEEDED
Status: DISCONTINUED | OUTPATIENT
Start: 2020-10-24 | End: 2020-10-24 | Stop reason: SURG

## 2020-10-24 RX ORDER — CALCIUM CHLORIDE 100 MG/ML
INJECTION INTRAVENOUS; INTRAVENTRICULAR AS NEEDED
Status: DISCONTINUED | OUTPATIENT
Start: 2020-10-24 | End: 2020-10-24 | Stop reason: SURG

## 2020-10-24 RX ORDER — MIDAZOLAM HYDROCHLORIDE 1 MG/ML
INJECTION INTRAMUSCULAR; INTRAVENOUS AS NEEDED
Status: DISCONTINUED | OUTPATIENT
Start: 2020-10-24 | End: 2020-10-24 | Stop reason: SURG

## 2020-10-24 RX ORDER — MEPERIDINE HYDROCHLORIDE 25 MG/ML
12.5 INJECTION INTRAMUSCULAR; INTRAVENOUS; SUBCUTANEOUS
Status: DISCONTINUED | OUTPATIENT
Start: 2020-10-24 | End: 2020-10-24

## 2020-10-24 RX ORDER — PROPOFOL 10 MG/ML
VIAL (ML) INTRAVENOUS
Status: COMPLETED
Start: 2020-10-24 | End: 2020-10-24

## 2020-10-24 RX ORDER — ONDANSETRON 2 MG/ML
4 INJECTION INTRAMUSCULAR; INTRAVENOUS EVERY 6 HOURS PRN
Status: DISCONTINUED | OUTPATIENT
Start: 2020-10-24 | End: 2020-10-28 | Stop reason: HOSPADM

## 2020-10-24 RX ORDER — SODIUM CHLORIDE 0.9 % (FLUSH) 0.9 %
10 SYRINGE (ML) INJECTION AS NEEDED
Status: DISCONTINUED | OUTPATIENT
Start: 2020-10-24 | End: 2020-10-28 | Stop reason: HOSPADM

## 2020-10-24 RX ORDER — PROMETHAZINE HYDROCHLORIDE 25 MG/1
25 TABLET ORAL ONCE AS NEEDED
Status: DISCONTINUED | OUTPATIENT
Start: 2020-10-24 | End: 2020-10-24

## 2020-10-24 RX ORDER — ONDANSETRON 2 MG/ML
INJECTION INTRAMUSCULAR; INTRAVENOUS
Status: COMPLETED
Start: 2020-10-24 | End: 2020-10-24

## 2020-10-24 RX ORDER — FENTANYL CITRATE 50 UG/ML
75 INJECTION, SOLUTION INTRAMUSCULAR; INTRAVENOUS ONCE
Status: COMPLETED | OUTPATIENT
Start: 2020-10-24 | End: 2020-10-24

## 2020-10-24 RX ORDER — SODIUM CHLORIDE, SODIUM LACTATE, POTASSIUM CHLORIDE, CALCIUM CHLORIDE 600; 310; 30; 20 MG/100ML; MG/100ML; MG/100ML; MG/100ML
100 INJECTION, SOLUTION INTRAVENOUS CONTINUOUS
Status: DISCONTINUED | OUTPATIENT
Start: 2020-10-24 | End: 2020-10-26

## 2020-10-24 RX ORDER — POTASSIUM CHLORIDE 7.45 MG/ML
10 INJECTION INTRAVENOUS
Status: DISCONTINUED | OUTPATIENT
Start: 2020-10-24 | End: 2020-10-24 | Stop reason: HOSPADM

## 2020-10-24 RX ORDER — SODIUM CHLORIDE, SODIUM GLUCONATE, SODIUM ACETATE, POTASSIUM CHLORIDE AND MAGNESIUM CHLORIDE 526; 502; 368; 37; 30 MG/100ML; MG/100ML; MG/100ML; MG/100ML; MG/100ML
INJECTION, SOLUTION INTRAVENOUS AS NEEDED
Status: DISCONTINUED | OUTPATIENT
Start: 2020-10-24 | End: 2020-10-24 | Stop reason: SURG

## 2020-10-24 RX ORDER — FENTANYL CITRATE 50 UG/ML
INJECTION, SOLUTION INTRAMUSCULAR; INTRAVENOUS AS NEEDED
Status: DISCONTINUED | OUTPATIENT
Start: 2020-10-24 | End: 2020-10-24 | Stop reason: SURG

## 2020-10-24 RX ORDER — DEXTROSE MONOHYDRATE 25 G/50ML
25 INJECTION, SOLUTION INTRAVENOUS
Status: DISCONTINUED | OUTPATIENT
Start: 2020-10-24 | End: 2020-10-28 | Stop reason: HOSPADM

## 2020-10-24 RX ORDER — ONDANSETRON 2 MG/ML
4 INJECTION INTRAMUSCULAR; INTRAVENOUS ONCE AS NEEDED
Status: DISCONTINUED | OUTPATIENT
Start: 2020-10-24 | End: 2020-10-24

## 2020-10-24 RX ORDER — SUCCINYLCHOLINE CHLORIDE 20 MG/ML
INJECTION INTRAMUSCULAR; INTRAVENOUS AS NEEDED
Status: DISCONTINUED | OUTPATIENT
Start: 2020-10-24 | End: 2020-10-24 | Stop reason: SURG

## 2020-10-24 RX ORDER — CHLORHEXIDINE GLUCONATE 0.12 MG/ML
15 RINSE ORAL EVERY 12 HOURS SCHEDULED
Status: DISCONTINUED | OUTPATIENT
Start: 2020-10-24 | End: 2020-10-25

## 2020-10-24 RX ORDER — ENALAPRILAT 2.5 MG/2ML
0.62 INJECTION INTRAVENOUS EVERY 6 HOURS PRN
Status: DISCONTINUED | OUTPATIENT
Start: 2020-10-24 | End: 2020-10-25

## 2020-10-24 RX ORDER — ROCURONIUM BROMIDE 10 MG/ML
INJECTION, SOLUTION INTRAVENOUS AS NEEDED
Status: DISCONTINUED | OUTPATIENT
Start: 2020-10-24 | End: 2020-10-24 | Stop reason: SURG

## 2020-10-24 RX ORDER — DILTIAZEM HCL IN NACL,ISO-OSM 125 MG/125
5-15 PLASTIC BAG, INJECTION (ML) INTRAVENOUS
Status: DISCONTINUED | OUTPATIENT
Start: 2020-10-25 | End: 2020-10-26

## 2020-10-24 RX ORDER — PANTOPRAZOLE SODIUM 40 MG/10ML
40 INJECTION, POWDER, LYOPHILIZED, FOR SOLUTION INTRAVENOUS
Status: DISCONTINUED | OUTPATIENT
Start: 2020-10-25 | End: 2020-10-28 | Stop reason: HOSPADM

## 2020-10-24 RX ORDER — SODIUM CHLORIDE, SODIUM LACTATE, POTASSIUM CHLORIDE, CALCIUM CHLORIDE 600; 310; 30; 20 MG/100ML; MG/100ML; MG/100ML; MG/100ML
100 INJECTION, SOLUTION INTRAVENOUS CONTINUOUS
Status: DISCONTINUED | OUTPATIENT
Start: 2020-10-24 | End: 2020-10-24 | Stop reason: HOSPADM

## 2020-10-24 RX ORDER — MAGNESIUM SULFATE 1 G/100ML
1 INJECTION INTRAVENOUS ONCE
Status: COMPLETED | OUTPATIENT
Start: 2020-10-24 | End: 2020-10-24

## 2020-10-24 RX ADMIN — PHENYLEPHRINE HYDROCHLORIDE 50 MCG: 10 INJECTION INTRAVENOUS at 21:58

## 2020-10-24 RX ADMIN — ROCURONIUM BROMIDE 35 MG: 10 INJECTION INTRAVENOUS at 21:55

## 2020-10-24 RX ADMIN — PROPOFOL 150 MG: 10 INJECTION, EMULSION INTRAVENOUS at 21:33

## 2020-10-24 RX ADMIN — FENTANYL CITRATE 50 MCG: 50 INJECTION, SOLUTION INTRAMUSCULAR; INTRAVENOUS at 22:02

## 2020-10-24 RX ADMIN — SODIUM CHLORIDE, SODIUM GLUCONATE, SODIUM ACETATE, POTASSIUM CHLORIDE AND MAGNESIUM CHLORIDE 250 ML: 526; 502; 368; 37; 30 INJECTION, SOLUTION INTRAVENOUS at 22:12

## 2020-10-24 RX ADMIN — IOPAMIDOL 90 ML: 755 INJECTION, SOLUTION INTRAVENOUS at 18:50

## 2020-10-24 RX ADMIN — SODIUM CHLORIDE, SODIUM GLUCONATE, SODIUM ACETATE, POTASSIUM CHLORIDE AND MAGNESIUM CHLORIDE 250 ML: 526; 502; 368; 37; 30 INJECTION, SOLUTION INTRAVENOUS at 21:41

## 2020-10-24 RX ADMIN — PHENYLEPHRINE HYDROCHLORIDE 50 MCG: 10 INJECTION INTRAVENOUS at 21:48

## 2020-10-24 RX ADMIN — POTASSIUM CHLORIDE 10 MEQ: 10 INJECTION, SOLUTION INTRAVENOUS at 22:19

## 2020-10-24 RX ADMIN — MIDAZOLAM HYDROCHLORIDE 2 MG: 2 INJECTION, SOLUTION INTRAMUSCULAR; INTRAVENOUS at 22:58

## 2020-10-24 RX ADMIN — ONDANSETRON 4 MG: 2 INJECTION INTRAMUSCULAR; INTRAVENOUS at 17:37

## 2020-10-24 RX ADMIN — FENTANYL CITRATE 75 MCG: 50 INJECTION, SOLUTION INTRAMUSCULAR; INTRAVENOUS at 19:50

## 2020-10-24 RX ADMIN — SODIUM CHLORIDE, POTASSIUM CHLORIDE, SODIUM LACTATE AND CALCIUM CHLORIDE: 600; 310; 30; 20 INJECTION, SOLUTION INTRAVENOUS at 21:19

## 2020-10-24 RX ADMIN — FENTANYL CITRATE 50 MCG: 50 INJECTION, SOLUTION INTRAMUSCULAR; INTRAVENOUS at 22:05

## 2020-10-24 RX ADMIN — PROPOFOL 50 MCG/KG/MIN: 10 INJECTION, EMULSION INTRAVENOUS at 23:47

## 2020-10-24 RX ADMIN — ROCURONIUM BROMIDE 30 MG: 10 INJECTION INTRAVENOUS at 22:47

## 2020-10-24 RX ADMIN — FENTANYL CITRATE 75 MCG: 50 INJECTION, SOLUTION INTRAMUSCULAR; INTRAVENOUS at 18:38

## 2020-10-24 RX ADMIN — VANCOMYCIN HYDROCHLORIDE 1500 MG: 10 INJECTION, POWDER, LYOPHILIZED, FOR SOLUTION INTRAVENOUS at 19:52

## 2020-10-24 RX ADMIN — ROCURONIUM BROMIDE 10 MG: 10 INJECTION INTRAVENOUS at 22:31

## 2020-10-24 RX ADMIN — HYDROMORPHONE HYDROCHLORIDE: 2 INJECTION INTRAMUSCULAR; INTRAVENOUS; SUBCUTANEOUS at 23:37

## 2020-10-24 RX ADMIN — LIDOCAINE HYDROCHLORIDE 50 MG: 20 INJECTION, SOLUTION INFILTRATION; PERINEURAL at 21:33

## 2020-10-24 RX ADMIN — SODIUM CHLORIDE 1000 ML: 900 INJECTION, SOLUTION INTRAVENOUS at 18:39

## 2020-10-24 RX ADMIN — PANTOPRAZOLE SODIUM 40 MG: 40 INJECTION, POWDER, FOR SOLUTION INTRAVENOUS at 19:40

## 2020-10-24 RX ADMIN — CALCIUM CHLORIDE 1 G: 100 INJECTION, SOLUTION INTRAVENOUS at 22:20

## 2020-10-24 RX ADMIN — PROPOFOL 1000 MG: 10 INJECTION, EMULSION INTRAVENOUS at 23:55

## 2020-10-24 RX ADMIN — ONDANSETRON HYDROCHLORIDE 4 MG: 2 INJECTION, SOLUTION INTRAMUSCULAR; INTRAVENOUS at 17:37

## 2020-10-24 RX ADMIN — POTASSIUM CHLORIDE 10 MEQ: 10 INJECTION, SOLUTION INTRAVENOUS at 18:39

## 2020-10-24 RX ADMIN — HYDROMORPHONE HYDROCHLORIDE 1 MG: 1 INJECTION, SOLUTION INTRAMUSCULAR; INTRAVENOUS; SUBCUTANEOUS at 23:36

## 2020-10-24 RX ADMIN — PHENYLEPHRINE HYDROCHLORIDE 200 MCG: 10 INJECTION INTRAVENOUS at 21:43

## 2020-10-24 RX ADMIN — FENTANYL CITRATE 100 MCG: 50 INJECTION, SOLUTION INTRAMUSCULAR; INTRAVENOUS at 22:58

## 2020-10-24 RX ADMIN — SODIUM CHLORIDE, SODIUM GLUCONATE, SODIUM ACETATE, POTASSIUM CHLORIDE AND MAGNESIUM CHLORIDE 250 ML: 526; 502; 368; 37; 30 INJECTION, SOLUTION INTRAVENOUS at 21:25

## 2020-10-24 RX ADMIN — MAGNESIUM SULFATE HEPTAHYDRATE 1 G: 1 INJECTION, SOLUTION INTRAVENOUS at 18:39

## 2020-10-24 RX ADMIN — SODIUM CHLORIDE, SODIUM GLUCONATE, SODIUM ACETATE, POTASSIUM CHLORIDE AND MAGNESIUM CHLORIDE 250 ML: 526; 502; 368; 37; 30 INJECTION, SOLUTION INTRAVENOUS at 22:05

## 2020-10-24 RX ADMIN — SODIUM CHLORIDE 1000 ML: 900 INJECTION, SOLUTION INTRAVENOUS at 20:23

## 2020-10-24 RX ADMIN — SODIUM BICARBONATE 50 MEQ: 84 INJECTION INTRAVENOUS at 22:28

## 2020-10-24 RX ADMIN — MAGNESIUM SULFATE HEPTAHYDRATE 1 G: 500 INJECTION, SOLUTION INTRAMUSCULAR; INTRAVENOUS at 22:21

## 2020-10-24 RX ADMIN — SUCCINYLCHOLINE CHLORIDE 120 MG: 20 INJECTION, SOLUTION INTRAMUSCULAR; INTRAVENOUS at 21:34

## 2020-10-24 RX ADMIN — MIDAZOLAM HYDROCHLORIDE 2 MG: 2 INJECTION, SOLUTION INTRAMUSCULAR; INTRAVENOUS at 21:26

## 2020-10-24 RX ADMIN — SODIUM CHLORIDE, SODIUM GLUCONATE, SODIUM ACETATE, POTASSIUM CHLORIDE AND MAGNESIUM CHLORIDE 250 ML: 526; 502; 368; 37; 30 INJECTION, SOLUTION INTRAVENOUS at 22:45

## 2020-10-24 RX ADMIN — FENTANYL CITRATE 50 MCG: 50 INJECTION, SOLUTION INTRAMUSCULAR; INTRAVENOUS at 21:59

## 2020-10-24 RX ADMIN — SODIUM CHLORIDE, SODIUM GLUCONATE, SODIUM ACETATE, POTASSIUM CHLORIDE AND MAGNESIUM CHLORIDE 250 ML: 526; 502; 368; 37; 30 INJECTION, SOLUTION INTRAVENOUS at 23:11

## 2020-10-24 RX ADMIN — ROCURONIUM BROMIDE 5 MG: 10 INJECTION INTRAVENOUS at 21:34

## 2020-10-24 RX ADMIN — FENTANYL CITRATE 100 MCG: 50 INJECTION, SOLUTION INTRAMUSCULAR; INTRAVENOUS at 21:31

## 2020-10-24 RX ADMIN — SODIUM CHLORIDE, SODIUM GLUCONATE, SODIUM ACETATE, POTASSIUM CHLORIDE AND MAGNESIUM CHLORIDE 250 ML: 526; 502; 368; 37; 30 INJECTION, SOLUTION INTRAVENOUS at 21:51

## 2020-10-24 RX ADMIN — SODIUM CHLORIDE, POTASSIUM CHLORIDE, SODIUM LACTATE AND CALCIUM CHLORIDE 100 ML/HR: 600; 310; 30; 20 INJECTION, SOLUTION INTRAVENOUS at 23:51

## 2020-10-24 RX ADMIN — MORPHINE SULFATE 4 MG: 4 INJECTION, SOLUTION INTRAMUSCULAR; INTRAVENOUS at 17:37

## 2020-10-25 LAB
ANION GAP SERPL CALCULATED.3IONS-SCNC: 12 MMOL/L (ref 5–15)
ANION GAP SERPL CALCULATED.3IONS-SCNC: 12 MMOL/L (ref 5–15)
ARTERIAL PATENCY WRIST A: ABNORMAL
ATMOSPHERIC PRESS: 748 MMHG
BASE EXCESS BLDA CALC-SCNC: -3.4 MMOL/L (ref 0–2)
BASOPHILS # BLD AUTO: 0.04 10*3/MM3 (ref 0–0.2)
BASOPHILS NFR BLD AUTO: 0.2 % (ref 0–1.5)
BDY SITE: ABNORMAL
BUN SERPL-MCNC: 10 MG/DL (ref 6–20)
BUN SERPL-MCNC: 9 MG/DL (ref 6–20)
BUN/CREAT SERPL: 16.7 (ref 7–25)
BUN/CREAT SERPL: 19.2 (ref 7–25)
CALCIUM SPEC-SCNC: 8.2 MG/DL (ref 8.6–10.5)
CALCIUM SPEC-SCNC: 8.3 MG/DL (ref 8.6–10.5)
CHLORIDE SERPL-SCNC: 102 MMOL/L (ref 98–107)
CHLORIDE SERPL-SCNC: 103 MMOL/L (ref 98–107)
CO2 SERPL-SCNC: 22 MMOL/L (ref 22–29)
CO2 SERPL-SCNC: 22 MMOL/L (ref 22–29)
CREAT SERPL-MCNC: 0.52 MG/DL (ref 0.57–1)
CREAT SERPL-MCNC: 0.54 MG/DL (ref 0.57–1)
DEPRECATED RDW RBC AUTO: 45.5 FL (ref 37–54)
EOSINOPHIL # BLD AUTO: 0.01 10*3/MM3 (ref 0–0.4)
EOSINOPHIL NFR BLD AUTO: 0 % (ref 0.3–6.2)
ERYTHROCYTE [DISTWIDTH] IN BLOOD BY AUTOMATED COUNT: 14.3 % (ref 12.3–15.4)
GFR SERPL CREATININE-BSD FRML MDRD: 123 ML/MIN/1.73
GFR SERPL CREATININE-BSD FRML MDRD: 129 ML/MIN/1.73
GFR SERPL CREATININE-BSD FRML MDRD: 149 ML/MIN/1.73
GFR SERPL CREATININE-BSD FRML MDRD: >150 ML/MIN/1.73
GLUCOSE BLDC GLUCOMTR-MCNC: 107 MG/DL (ref 70–130)
GLUCOSE BLDC GLUCOMTR-MCNC: 94 MG/DL (ref 70–130)
GLUCOSE SERPL-MCNC: 166 MG/DL (ref 65–99)
GLUCOSE SERPL-MCNC: 174 MG/DL (ref 65–99)
HCO3 BLDA-SCNC: 22.4 MMOL/L (ref 20–26)
HCT VFR BLD AUTO: 38.3 % (ref 34–46.6)
HGB BLD-MCNC: 13 G/DL (ref 12–15.9)
HOLD SPECIMEN: NORMAL
IMM GRANULOCYTES # BLD AUTO: 0.19 10*3/MM3 (ref 0–0.05)
IMM GRANULOCYTES NFR BLD AUTO: 0.7 % (ref 0–0.5)
INHALED O2 CONCENTRATION: 40 %
LYMPHOCYTES # BLD AUTO: 2.64 10*3/MM3 (ref 0.7–3.1)
LYMPHOCYTES NFR BLD AUTO: 10 % (ref 19.6–45.3)
Lab: ABNORMAL
MAGNESIUM SERPL-MCNC: 2.2 MG/DL (ref 1.6–2.6)
MAGNESIUM SERPL-MCNC: 2.3 MG/DL (ref 1.6–2.6)
MCH RBC QN AUTO: 29.6 PG (ref 26.6–33)
MCHC RBC AUTO-ENTMCNC: 33.9 G/DL (ref 31.5–35.7)
MCV RBC AUTO: 87.2 FL (ref 79–97)
MODALITY: ABNORMAL
MONOCYTES # BLD AUTO: 1.64 10*3/MM3 (ref 0.1–0.9)
MONOCYTES NFR BLD AUTO: 6.2 % (ref 5–12)
NEUTROPHILS NFR BLD AUTO: 21.9 10*3/MM3 (ref 1.7–7)
NEUTROPHILS NFR BLD AUTO: 82.9 % (ref 42.7–76)
NRBC BLD AUTO-RTO: 0 /100 WBC (ref 0–0.2)
PAW @ PEAK INSP FLOW SETTING VENT: 25 CMH2O
PCO2 BLDA: 42.3 MM HG (ref 35–45)
PEEP RESPIRATORY: 5 CM[H2O]
PH BLDA: 7.33 PH UNITS (ref 7.35–7.45)
PHOSPHATE SERPL-MCNC: 3.3 MG/DL (ref 2.5–4.5)
PHOSPHATE SERPL-MCNC: 3.7 MG/DL (ref 2.5–4.5)
PLATELET # BLD AUTO: 355 10*3/MM3 (ref 140–450)
PMV BLD AUTO: 8.8 FL (ref 6–12)
PO2 BLDA: 73.7 MM HG (ref 83–108)
POTASSIUM SERPL-SCNC: 3.9 MMOL/L (ref 3.5–5.2)
POTASSIUM SERPL-SCNC: 4.1 MMOL/L (ref 3.5–5.2)
PSV: 10 CMH2O
RBC # BLD AUTO: 4.39 10*6/MM3 (ref 3.77–5.28)
SAO2 % BLDCOA: 94.3 % (ref 94–99)
SARS-COV-2 N GENE RESP QL NAA+PROBE: NOT DETECTED
SET MECH RESP RATE: 12
SODIUM SERPL-SCNC: 136 MMOL/L (ref 136–145)
SODIUM SERPL-SCNC: 137 MMOL/L (ref 136–145)
VENTILATOR MODE: ABNORMAL
VT ON VENT VENT: 600 ML
WBC # BLD AUTO: 26.42 10*3/MM3 (ref 3.4–10.8)
WHOLE BLOOD HOLD SPECIMEN: NORMAL

## 2020-10-25 PROCEDURE — 25010000002 PIPERACILLIN SOD-TAZOBACTAM PER 1 G: Performed by: SURGERY

## 2020-10-25 PROCEDURE — 25010000002 ENOXAPARIN PER 10 MG: Performed by: SURGERY

## 2020-10-25 PROCEDURE — 99024 POSTOP FOLLOW-UP VISIT: CPT | Performed by: SURGERY

## 2020-10-25 PROCEDURE — 94799 UNLISTED PULMONARY SVC/PX: CPT

## 2020-10-25 PROCEDURE — 94640 AIRWAY INHALATION TREATMENT: CPT

## 2020-10-25 PROCEDURE — 97162 PT EVAL MOD COMPLEX 30 MIN: CPT

## 2020-10-25 PROCEDURE — 82962 GLUCOSE BLOOD TEST: CPT

## 2020-10-25 PROCEDURE — 25010000002 HYDROMORPHONE 1 MG/ML SOLUTION: Performed by: SURGERY

## 2020-10-25 PROCEDURE — 80048 BASIC METABOLIC PNL TOTAL CA: CPT | Performed by: SURGERY

## 2020-10-25 PROCEDURE — 25010000002 PROMETHAZINE PER 50 MG: Performed by: SURGERY

## 2020-10-25 PROCEDURE — 25010000002 PROPOFOL 10 MG/ML EMULSION: Performed by: SURGERY

## 2020-10-25 PROCEDURE — 0DB70ZX EXCISION OF STOMACH, PYLORUS, OPEN APPROACH, DIAGNOSTIC: ICD-10-PCS | Performed by: SURGERY

## 2020-10-25 PROCEDURE — 25010000002 KETOROLAC TROMETHAMINE PER 15 MG: Performed by: SURGERY

## 2020-10-25 PROCEDURE — 0DH60UZ INSERTION OF FEEDING DEVICE INTO STOMACH, OPEN APPROACH: ICD-10-PCS | Performed by: SURGERY

## 2020-10-25 PROCEDURE — 93005 ELECTROCARDIOGRAM TRACING: CPT | Performed by: SURGERY

## 2020-10-25 PROCEDURE — 25010000002 ONDANSETRON PER 1 MG: Performed by: SURGERY

## 2020-10-25 PROCEDURE — 83735 ASSAY OF MAGNESIUM: CPT | Performed by: SURGERY

## 2020-10-25 PROCEDURE — 84100 ASSAY OF PHOSPHORUS: CPT | Performed by: SURGERY

## 2020-10-25 PROCEDURE — 85025 COMPLETE CBC W/AUTO DIFF WBC: CPT | Performed by: SURGERY

## 2020-10-25 PROCEDURE — 0DU707Z SUPPLEMENT STOMACH, PYLORUS WITH AUTOLOGOUS TISSUE SUBSTITUTE, OPEN APPROACH: ICD-10-PCS | Performed by: SURGERY

## 2020-10-25 PROCEDURE — 25010000002 HYDROMORPHONE PER 4 MG: Performed by: SURGERY

## 2020-10-25 PROCEDURE — 94760 N-INVAS EAR/PLS OXIMETRY 1: CPT

## 2020-10-25 RX ORDER — ROSUVASTATIN CALCIUM 5 MG/1
5 TABLET, COATED ORAL NIGHTLY
Status: DISCONTINUED | OUTPATIENT
Start: 2020-10-25 | End: 2020-10-28 | Stop reason: HOSPADM

## 2020-10-25 RX ORDER — KETOROLAC TROMETHAMINE 30 MG/ML
20 INJECTION, SOLUTION INTRAMUSCULAR; INTRAVENOUS EVERY 6 HOURS PRN
Status: DISCONTINUED | OUTPATIENT
Start: 2020-10-25 | End: 2020-10-28 | Stop reason: HOSPADM

## 2020-10-25 RX ORDER — KETOROLAC TROMETHAMINE 30 MG/ML
30 INJECTION, SOLUTION INTRAMUSCULAR; INTRAVENOUS ONCE
Status: COMPLETED | OUTPATIENT
Start: 2020-10-25 | End: 2020-10-25

## 2020-10-25 RX ORDER — DULOXETIN HYDROCHLORIDE 60 MG/1
60 CAPSULE, DELAYED RELEASE ORAL DAILY
Status: DISCONTINUED | OUTPATIENT
Start: 2020-10-25 | End: 2020-10-28 | Stop reason: HOSPADM

## 2020-10-25 RX ORDER — ESCITALOPRAM OXALATE 10 MG/1
10 TABLET ORAL DAILY
Status: DISCONTINUED | OUTPATIENT
Start: 2020-10-25 | End: 2020-10-28 | Stop reason: HOSPADM

## 2020-10-25 RX ORDER — BUPRENORPHINE HYDROCHLORIDE AND NALOXONE HYDROCHLORIDE DIHYDRATE 8; 2 MG/1; MG/1
3 TABLET SUBLINGUAL DAILY
Status: DISCONTINUED | OUTPATIENT
Start: 2020-10-25 | End: 2020-10-28 | Stop reason: HOSPADM

## 2020-10-25 RX ADMIN — PIPERACILLIN SODIUM AND TAZOBACTAM SODIUM 3.38 G: 3; .375 INJECTION, POWDER, LYOPHILIZED, FOR SOLUTION INTRAVENOUS at 03:15

## 2020-10-25 RX ADMIN — PIPERACILLIN SODIUM AND TAZOBACTAM SODIUM 3.38 G: 3; .375 INJECTION, POWDER, LYOPHILIZED, FOR SOLUTION INTRAVENOUS at 20:35

## 2020-10-25 RX ADMIN — ALBUTEROL SULFATE 2.5 MG: 2.5 SOLUTION RESPIRATORY (INHALATION) at 07:45

## 2020-10-25 RX ADMIN — SODIUM CHLORIDE, PRESERVATIVE FREE 10 ML: 5 INJECTION INTRAVENOUS at 10:21

## 2020-10-25 RX ADMIN — ONDANSETRON HYDROCHLORIDE 4 MG: 2 INJECTION, SOLUTION INTRAMUSCULAR; INTRAVENOUS at 15:49

## 2020-10-25 RX ADMIN — HYDROMORPHONE HYDROCHLORIDE: 2 INJECTION INTRAMUSCULAR; INTRAVENOUS; SUBCUTANEOUS at 10:16

## 2020-10-25 RX ADMIN — Medication 5 MG/HR: at 00:00

## 2020-10-25 RX ADMIN — PROPOFOL 50 MCG/KG/MIN: 10 INJECTION, EMULSION INTRAVENOUS at 03:10

## 2020-10-25 RX ADMIN — HYDROMORPHONE HYDROCHLORIDE 1 MG: 1 INJECTION, SOLUTION INTRAMUSCULAR; INTRAVENOUS; SUBCUTANEOUS at 23:14

## 2020-10-25 RX ADMIN — HYDROMORPHONE HYDROCHLORIDE 1 MG: 1 INJECTION, SOLUTION INTRAMUSCULAR; INTRAVENOUS; SUBCUTANEOUS at 09:33

## 2020-10-25 RX ADMIN — SODIUM CHLORIDE, POTASSIUM CHLORIDE, SODIUM LACTATE AND CALCIUM CHLORIDE 100 ML/HR: 600; 310; 30; 20 INJECTION, SOLUTION INTRAVENOUS at 10:59

## 2020-10-25 RX ADMIN — CHLORHEXIDINE GLUCONATE 0.12% ORAL RINSE 15 ML: 1.2 LIQUID ORAL at 00:08

## 2020-10-25 RX ADMIN — KETOROLAC TROMETHAMINE 30 MG: 30 INJECTION, SOLUTION INTRAMUSCULAR at 18:09

## 2020-10-25 RX ADMIN — PANTOPRAZOLE SODIUM 40 MG: 40 INJECTION, POWDER, FOR SOLUTION INTRAVENOUS at 09:33

## 2020-10-25 RX ADMIN — HYDROMORPHONE HYDROCHLORIDE: 2 INJECTION INTRAMUSCULAR; INTRAVENOUS; SUBCUTANEOUS at 15:23

## 2020-10-25 RX ADMIN — ENOXAPARIN SODIUM 40 MG: 40 INJECTION SUBCUTANEOUS at 10:16

## 2020-10-25 RX ADMIN — ESCITALOPRAM OXALATE 10 MG: 10 TABLET ORAL at 10:21

## 2020-10-25 RX ADMIN — ONDANSETRON HYDROCHLORIDE 4 MG: 2 INJECTION, SOLUTION INTRAMUSCULAR; INTRAVENOUS at 09:33

## 2020-10-25 RX ADMIN — HYDROMORPHONE HYDROCHLORIDE 1 MG: 1 INJECTION, SOLUTION INTRAMUSCULAR; INTRAVENOUS; SUBCUTANEOUS at 13:55

## 2020-10-25 RX ADMIN — KETOROLAC TROMETHAMINE 30 MG: 30 INJECTION, SOLUTION INTRAMUSCULAR; INTRAVENOUS at 17:12

## 2020-10-25 RX ADMIN — BUPRENORPHINE HYDROCHLORIDE AND NALOXONE HYDROCHLORIDE DIHYDRATE 3 TABLET: 8; 2 TABLET SUBLINGUAL at 09:40

## 2020-10-25 RX ADMIN — DULOXETINE HYDROCHLORIDE 60 MG: 60 CAPSULE, DELAYED RELEASE ORAL at 10:21

## 2020-10-25 RX ADMIN — HYDROMORPHONE HYDROCHLORIDE: 2 INJECTION INTRAMUSCULAR; INTRAVENOUS; SUBCUTANEOUS at 20:32

## 2020-10-25 RX ADMIN — KETOROLAC TROMETHAMINE 20 MG: 30 INJECTION, SOLUTION INTRAMUSCULAR; INTRAVENOUS at 23:14

## 2020-10-25 RX ADMIN — ROSUVASTATIN CALCIUM 5 MG: 5 TABLET, FILM COATED ORAL at 20:41

## 2020-10-25 RX ADMIN — HYDROMORPHONE HYDROCHLORIDE 1 MG: 1 INJECTION, SOLUTION INTRAMUSCULAR; INTRAVENOUS; SUBCUTANEOUS at 18:03

## 2020-10-25 RX ADMIN — HYDROMORPHONE HYDROCHLORIDE 1 MG: 1 INJECTION, SOLUTION INTRAMUSCULAR; INTRAVENOUS; SUBCUTANEOUS at 15:11

## 2020-10-25 RX ADMIN — PIPERACILLIN SODIUM AND TAZOBACTAM SODIUM 3.38 G: 3; .375 INJECTION, POWDER, LYOPHILIZED, FOR SOLUTION INTRAVENOUS at 12:13

## 2020-10-25 NOTE — ANESTHESIA PROCEDURE NOTES
Airway  Urgency: elective    Date/Time: 10/24/2020 9:34 PM  Airway not difficult    General Information and Staff    Patient location during procedure: OR    Indications and Patient Condition  Indications for airway management: airway protection    Preoxygenated: yes  MILS maintained throughout  Mask difficulty assessment: 1 - vent by mask    Final Airway Details  Final airway type: endotracheal airway      Successful airway: ETT  Cuffed: yes   Successful intubation technique: direct laryngoscopy, RSI and video laryngoscopy  Facilitating devices/methods: intubating stylet  Endotracheal tube insertion site: oral  Blade: Ingram  Blade size: 4  ETT size (mm): 7.5  Cormack-Lehane Classification: grade IIa - partial view of glottis  Placement verified by: chest auscultation and capnometry   Measured from: lips  ETT/EBT  to lips (cm): 22  Number of attempts at approach: 1  Assessment: lips, teeth, and gum same as pre-op and atraumatic intubation

## 2020-10-25 NOTE — ANESTHESIA PROCEDURE NOTES
Arterial Line    Pre-sedation assessment completed: 10/24/2020 9:35 PM    Patient reassessed immediately prior to procedure    Patient location during procedure: OR  Start time: 10/24/2020 9:36 PM  Stop Time:10/24/2020 9:41 PM       Line placed for MD/Surgeon request and hemodynamic monitoring.  Performed By   Anesthesiologist: Kirk Callejas MD  Preanesthetic Checklist  Completed: patient identified, site marked, surgical consent, pre-op evaluation, timeout performed, IV checked, risks and benefits discussed and monitors and equipment checked  Arterial Line Prep   Sterile Tech: cap, gloves and mask  Prep: alcohol swabs  Patient monitoring: blood pressure monitoring, continuous pulse oximetry and EKG  Arterial Line Procedure   Laterality:left  Location:  radial artery  Catheter size: 20 G   Guidance: palpation technique  Number of attempts: 1  Successful placement: yes  Post Assessment   Dressing Type: occlusive dressing applied and secured with tape.   Complications no  Circ/Move/Sens Assessment: normal.   Patient Tolerance: patient tolerated the procedure well with no apparent complications

## 2020-10-25 NOTE — ANESTHESIA PREPROCEDURE EVALUATION
Anesthesia Evaluation     Patient summary reviewed and Nursing notes reviewed   no history of anesthetic complications:  NPO Solid Status: Waived due to emergency  NPO Liquid Status: Waived due to emergency           Airway   Mallampati: III  TM distance: >3 FB  Neck ROM: full  possible difficult intubation and Small opening  Dental    (+) edentulous    Pulmonary - normal exam    breath sounds clear to auscultation  (+) a smoker (2 ppd) Current Smoked day of surgery, COPD mild,   (-) sleep apnea    ROS comment: Snores    Mild streaky right suprahilar opacity, may represent mild  atelectasis or minimal infiltrate.  Cardiovascular   Exercise tolerance: good (4-7 METS)    ECG reviewed  Rhythm: regular  Rate: abnormal    (+) hypertension well controlled less than 2 medications, hyperlipidemia,   (-) valvular problems/murmurs, dysrhythmias, angina, murmur, cardiac stents, DVT    ROS comment: Rhythm: sinus tachycardia   Rate: tachycardic   BPM: 119   QRS axis: left   Conduction: LAFB   ST Segments: ST segments normal   T Waves: T waves normal   Other findings: EJ   Clinical impression: abnormal ECG   Interpreted by [place]        Neuro/Psych  (+) headaches (occ migraines and HA always), psychiatric history Anxiety and Depression,     (-) seizures, TIA, CVA, weakness, numbness  GI/Hepatic/Renal/Endo    (+)  GERD,  renal disease stones, diabetes mellitus (glu 175) type 2 poorly controlled using insulin,   (-) hepatitis, liver disease    Musculoskeletal     (+) arthralgias, back pain, chronic pain,   Abdominal    Substance History - negative use  (-) alcohol use, drug use      Comment: Suboxone treatment.   OB/GYN negative ob/gyn ROS     Comment: History of hysterectomy.      Other   arthritis,    history of cancer    ROS/Med Hx Other: Rosacea.  K 2.6  WBC 37K  Duodenal ulcer                    Anesthesia Plan    ASA 3 - emergent     general   (Arterial line, 2PIVs in place, possible post op vent)  intravenous induction      Anesthetic plan, all risks, benefits, and alternatives have been provided, discussed and informed consent has been obtained with: patient.  Use of blood products discussed with patient  Consented to blood products.

## 2020-10-25 NOTE — ANESTHESIA POSTPROCEDURE EVALUATION
Patient: Jeanie Jones    Procedure Summary     Date: 10/24/20 Room / Location: [facility] OR 09 / [facility] OR    Anesthesia Start: 2126 Anesthesia Stop: 2325    Procedure: LAPAROTOMY EXPLORATORY AND CLOSURE OF GASTRIC ULCER, (N/A Abdomen) Diagnosis:       Pneumoperitoneum      (Pneumoperitoneum [K66.8])    Surgeon: Valeriano Goldberg MD Provider: Kirk Callejas MD    Anesthesia Type: general ASA Status: 3 - Emergent          Anesthesia Type: general    Vitals  Vitals Value Taken Time   BP     Temp     Pulse 156 10/24/20 2333   Resp     SpO2 94 % 10/24/20 2333   Vitals shown include unvalidated device data.        Post Anesthesia Care and Evaluation    Patient location during evaluation: ICU  Patient participation: complete - patient cannot participate  Level of consciousness: obtunded/minimal responses  Pain score: 0  Pain management: adequate  Airway patency: patent  Anesthetic complications: No anesthetic complications  PONV Status: none  Cardiovascular status: acceptable  Respiratory status: acceptable  Hydration status: acceptable

## 2020-10-26 LAB
ANION GAP SERPL CALCULATED.3IONS-SCNC: 11 MMOL/L (ref 5–15)
BASOPHILS # BLD AUTO: 0.06 10*3/MM3 (ref 0–0.2)
BASOPHILS NFR BLD AUTO: 0.4 % (ref 0–1.5)
BUN SERPL-MCNC: 12 MG/DL (ref 6–20)
BUN/CREAT SERPL: 18.8 (ref 7–25)
CALCIUM SPEC-SCNC: 8.6 MG/DL (ref 8.6–10.5)
CHLORIDE SERPL-SCNC: 99 MMOL/L (ref 98–107)
CO2 SERPL-SCNC: 29 MMOL/L (ref 22–29)
CREAT SERPL-MCNC: 0.64 MG/DL (ref 0.57–1)
DEPRECATED RDW RBC AUTO: 47.1 FL (ref 37–54)
EOSINOPHIL # BLD AUTO: 0.17 10*3/MM3 (ref 0–0.4)
EOSINOPHIL NFR BLD AUTO: 1.1 % (ref 0.3–6.2)
ERYTHROCYTE [DISTWIDTH] IN BLOOD BY AUTOMATED COUNT: 14.5 % (ref 12.3–15.4)
GFR SERPL CREATININE-BSD FRML MDRD: 101 ML/MIN/1.73
GFR SERPL CREATININE-BSD FRML MDRD: 123 ML/MIN/1.73
GLUCOSE BLDC GLUCOMTR-MCNC: 109 MG/DL (ref 70–130)
GLUCOSE BLDC GLUCOMTR-MCNC: 64 MG/DL (ref 70–130)
GLUCOSE SERPL-MCNC: 62 MG/DL (ref 65–99)
HCT VFR BLD AUTO: 38.6 % (ref 34–46.6)
HGB BLD-MCNC: 12.7 G/DL (ref 12–15.9)
IMM GRANULOCYTES # BLD AUTO: 0.07 10*3/MM3 (ref 0–0.05)
IMM GRANULOCYTES NFR BLD AUTO: 0.5 % (ref 0–0.5)
LYMPHOCYTES # BLD AUTO: 2.89 10*3/MM3 (ref 0.7–3.1)
LYMPHOCYTES NFR BLD AUTO: 19 % (ref 19.6–45.3)
MAGNESIUM SERPL-MCNC: 2 MG/DL (ref 1.6–2.6)
MCH RBC QN AUTO: 29.2 PG (ref 26.6–33)
MCHC RBC AUTO-ENTMCNC: 32.9 G/DL (ref 31.5–35.7)
MCV RBC AUTO: 88.7 FL (ref 79–97)
MONOCYTES # BLD AUTO: 0.96 10*3/MM3 (ref 0.1–0.9)
MONOCYTES NFR BLD AUTO: 6.3 % (ref 5–12)
NEUTROPHILS NFR BLD AUTO: 11.08 10*3/MM3 (ref 1.7–7)
NEUTROPHILS NFR BLD AUTO: 72.7 % (ref 42.7–76)
NRBC BLD AUTO-RTO: 0 /100 WBC (ref 0–0.2)
PHOSPHATE SERPL-MCNC: 2.9 MG/DL (ref 2.5–4.5)
PLATELET # BLD AUTO: 316 10*3/MM3 (ref 140–450)
PMV BLD AUTO: 8.8 FL (ref 6–12)
POTASSIUM SERPL-SCNC: 3 MMOL/L (ref 3.5–5.2)
RBC # BLD AUTO: 4.35 10*6/MM3 (ref 3.77–5.28)
SODIUM SERPL-SCNC: 139 MMOL/L (ref 136–145)
WBC # BLD AUTO: 15.23 10*3/MM3 (ref 3.4–10.8)

## 2020-10-26 PROCEDURE — 25010000002 MAGNESIUM SULFATE IN D5W 1G/100ML (PREMIX) 1-5 GM/100ML-% SOLUTION: Performed by: SURGERY

## 2020-10-26 PROCEDURE — 80048 BASIC METABOLIC PNL TOTAL CA: CPT | Performed by: SURGERY

## 2020-10-26 PROCEDURE — 85025 COMPLETE CBC W/AUTO DIFF WBC: CPT | Performed by: SURGERY

## 2020-10-26 PROCEDURE — 83735 ASSAY OF MAGNESIUM: CPT | Performed by: SURGERY

## 2020-10-26 PROCEDURE — 94799 UNLISTED PULMONARY SVC/PX: CPT

## 2020-10-26 PROCEDURE — 25010000002 HYDROMORPHONE PER 4 MG: Performed by: SURGERY

## 2020-10-26 PROCEDURE — 97110 THERAPEUTIC EXERCISES: CPT

## 2020-10-26 PROCEDURE — 97165 OT EVAL LOW COMPLEX 30 MIN: CPT

## 2020-10-26 PROCEDURE — 25010000002 HYDROMORPHONE 1 MG/ML SOLUTION: Performed by: SURGERY

## 2020-10-26 PROCEDURE — 84100 ASSAY OF PHOSPHORUS: CPT | Performed by: SURGERY

## 2020-10-26 PROCEDURE — 25010000003 POTASSIUM CHLORIDE 10 MEQ/100ML SOLUTION: Performed by: SURGERY

## 2020-10-26 PROCEDURE — 82962 GLUCOSE BLOOD TEST: CPT

## 2020-10-26 PROCEDURE — 97530 THERAPEUTIC ACTIVITIES: CPT

## 2020-10-26 PROCEDURE — 25010000002 KETOROLAC TROMETHAMINE PER 15 MG: Performed by: SURGERY

## 2020-10-26 PROCEDURE — 25010000002 ENOXAPARIN PER 10 MG: Performed by: SURGERY

## 2020-10-26 PROCEDURE — 25010000002 PIPERACILLIN SOD-TAZOBACTAM PER 1 G: Performed by: SURGERY

## 2020-10-26 RX ORDER — DEXTROSE, SODIUM CHLORIDE, AND POTASSIUM CHLORIDE 5; .45; .15 G/100ML; G/100ML; G/100ML
100 INJECTION INTRAVENOUS CONTINUOUS
Status: DISCONTINUED | OUTPATIENT
Start: 2020-10-26 | End: 2020-10-28

## 2020-10-26 RX ORDER — MAGNESIUM SULFATE 1 G/100ML
1 INJECTION INTRAVENOUS ONCE
Status: COMPLETED | OUTPATIENT
Start: 2020-10-26 | End: 2020-10-26

## 2020-10-26 RX ORDER — POTASSIUM CHLORIDE 7.45 MG/ML
10 INJECTION INTRAVENOUS
Status: COMPLETED | OUTPATIENT
Start: 2020-10-26 | End: 2020-10-27

## 2020-10-26 RX ORDER — NALOXONE HCL 0.4 MG/ML
0.1 VIAL (ML) INJECTION
Status: DISCONTINUED | OUTPATIENT
Start: 2020-10-26 | End: 2020-10-28 | Stop reason: HOSPADM

## 2020-10-26 RX ADMIN — ALBUTEROL SULFATE 2.5 MG: 2.5 SOLUTION RESPIRATORY (INHALATION) at 19:16

## 2020-10-26 RX ADMIN — HYDROMORPHONE HYDROCHLORIDE: 2 INJECTION INTRAMUSCULAR; INTRAVENOUS; SUBCUTANEOUS at 21:45

## 2020-10-26 RX ADMIN — POTASSIUM CHLORIDE 10 MEQ: 7.46 INJECTION, SOLUTION INTRAVENOUS at 19:03

## 2020-10-26 RX ADMIN — KETOROLAC TROMETHAMINE 20 MG: 30 INJECTION, SOLUTION INTRAMUSCULAR; INTRAVENOUS at 07:36

## 2020-10-26 RX ADMIN — DILTIAZEM HYDROCHLORIDE 30 MG: 30 TABLET, FILM COATED ORAL at 10:59

## 2020-10-26 RX ADMIN — DILTIAZEM HYDROCHLORIDE 30 MG: 30 TABLET, FILM COATED ORAL at 19:07

## 2020-10-26 RX ADMIN — HYDROMORPHONE HYDROCHLORIDE 1 MG: 1 INJECTION, SOLUTION INTRAMUSCULAR; INTRAVENOUS; SUBCUTANEOUS at 03:54

## 2020-10-26 RX ADMIN — HYDROMORPHONE HYDROCHLORIDE: 2 INJECTION INTRAMUSCULAR; INTRAVENOUS; SUBCUTANEOUS at 07:35

## 2020-10-26 RX ADMIN — POTASSIUM CHLORIDE 10 MEQ: 7.46 INJECTION, SOLUTION INTRAVENOUS at 21:48

## 2020-10-26 RX ADMIN — ESCITALOPRAM OXALATE 10 MG: 10 TABLET ORAL at 09:55

## 2020-10-26 RX ADMIN — KETOROLAC TROMETHAMINE 20 MG: 30 INJECTION, SOLUTION INTRAMUSCULAR; INTRAVENOUS at 15:32

## 2020-10-26 RX ADMIN — ALBUTEROL SULFATE 2.5 MG: 2.5 SOLUTION RESPIRATORY (INHALATION) at 07:41

## 2020-10-26 RX ADMIN — HYDROMORPHONE HYDROCHLORIDE: 2 INJECTION INTRAMUSCULAR; INTRAVENOUS; SUBCUTANEOUS at 13:28

## 2020-10-26 RX ADMIN — ROSUVASTATIN CALCIUM 5 MG: 5 TABLET, FILM COATED ORAL at 21:08

## 2020-10-26 RX ADMIN — KETOROLAC TROMETHAMINE 20 MG: 30 INJECTION, SOLUTION INTRAMUSCULAR; INTRAVENOUS at 21:15

## 2020-10-26 RX ADMIN — SODIUM CHLORIDE, POTASSIUM CHLORIDE, SODIUM LACTATE AND CALCIUM CHLORIDE 100 ML/HR: 600; 310; 30; 20 INJECTION, SOLUTION INTRAVENOUS at 12:43

## 2020-10-26 RX ADMIN — POTASSIUM CHLORIDE, DEXTROSE MONOHYDRATE AND SODIUM CHLORIDE 100 ML/HR: 150; 5; 450 INJECTION, SOLUTION INTRAVENOUS at 18:59

## 2020-10-26 RX ADMIN — PANTOPRAZOLE SODIUM 40 MG: 40 INJECTION, POWDER, FOR SOLUTION INTRAVENOUS at 09:55

## 2020-10-26 RX ADMIN — BUPRENORPHINE HYDROCHLORIDE AND NALOXONE HYDROCHLORIDE DIHYDRATE 3 TABLET: 8; 2 TABLET SUBLINGUAL at 09:55

## 2020-10-26 RX ADMIN — PIPERACILLIN SODIUM AND TAZOBACTAM SODIUM 3.38 G: 3; .375 INJECTION, POWDER, LYOPHILIZED, FOR SOLUTION INTRAVENOUS at 03:54

## 2020-10-26 RX ADMIN — PIPERACILLIN SODIUM AND TAZOBACTAM SODIUM 3.38 G: 3; .375 INJECTION, POWDER, LYOPHILIZED, FOR SOLUTION INTRAVENOUS at 21:08

## 2020-10-26 RX ADMIN — POTASSIUM CHLORIDE 10 MEQ: 7.46 INJECTION, SOLUTION INTRAVENOUS at 20:39

## 2020-10-26 RX ADMIN — MAGNESIUM SULFATE HEPTAHYDRATE 1 G: 1 INJECTION, SOLUTION INTRAVENOUS at 19:03

## 2020-10-26 RX ADMIN — DULOXETINE HYDROCHLORIDE 60 MG: 60 CAPSULE, DELAYED RELEASE ORAL at 09:55

## 2020-10-26 RX ADMIN — PIPERACILLIN SODIUM AND TAZOBACTAM SODIUM 3.38 G: 3; .375 INJECTION, POWDER, LYOPHILIZED, FOR SOLUTION INTRAVENOUS at 12:43

## 2020-10-26 RX ADMIN — POTASSIUM CHLORIDE 10 MEQ: 7.46 INJECTION, SOLUTION INTRAVENOUS at 23:09

## 2020-10-26 RX ADMIN — ENOXAPARIN SODIUM 40 MG: 40 INJECTION SUBCUTANEOUS at 09:55

## 2020-10-27 LAB
GLUCOSE BLDC GLUCOMTR-MCNC: 127 MG/DL (ref 70–130)
GLUCOSE BLDC GLUCOMTR-MCNC: 128 MG/DL (ref 70–130)
GLUCOSE BLDC GLUCOMTR-MCNC: 178 MG/DL (ref 70–130)
GLUCOSE BLDC GLUCOMTR-MCNC: 191 MG/DL (ref 70–130)
POTASSIUM SERPL-SCNC: 3.4 MMOL/L (ref 3.5–5.2)
WHOLE BLOOD HOLD SPECIMEN: NORMAL

## 2020-10-27 PROCEDURE — 25010000002 KETOROLAC TROMETHAMINE PER 15 MG: Performed by: SURGERY

## 2020-10-27 PROCEDURE — 82962 GLUCOSE BLOOD TEST: CPT

## 2020-10-27 PROCEDURE — 25010000002 HYDROMORPHONE PER 4 MG: Performed by: SURGERY

## 2020-10-27 PROCEDURE — 84132 ASSAY OF SERUM POTASSIUM: CPT | Performed by: SURGERY

## 2020-10-27 PROCEDURE — 25010000002 ENOXAPARIN PER 10 MG: Performed by: SURGERY

## 2020-10-27 PROCEDURE — 63710000001 INSULIN ASPART PER 5 UNITS: Performed by: SURGERY

## 2020-10-27 PROCEDURE — 94799 UNLISTED PULMONARY SVC/PX: CPT

## 2020-10-27 PROCEDURE — 97535 SELF CARE MNGMENT TRAINING: CPT

## 2020-10-27 PROCEDURE — 97116 GAIT TRAINING THERAPY: CPT

## 2020-10-27 PROCEDURE — 97110 THERAPEUTIC EXERCISES: CPT

## 2020-10-27 PROCEDURE — 25010000002 PIPERACILLIN SOD-TAZOBACTAM PER 1 G: Performed by: SURGERY

## 2020-10-27 RX ADMIN — ROSUVASTATIN CALCIUM 5 MG: 5 TABLET, FILM COATED ORAL at 20:00

## 2020-10-27 RX ADMIN — ENOXAPARIN SODIUM 40 MG: 40 INJECTION SUBCUTANEOUS at 10:25

## 2020-10-27 RX ADMIN — ESCITALOPRAM OXALATE 10 MG: 10 TABLET ORAL at 09:54

## 2020-10-27 RX ADMIN — HYDROMORPHONE HYDROCHLORIDE: 2 INJECTION INTRAMUSCULAR; INTRAVENOUS; SUBCUTANEOUS at 04:04

## 2020-10-27 RX ADMIN — KETOROLAC TROMETHAMINE 20 MG: 30 INJECTION, SOLUTION INTRAMUSCULAR; INTRAVENOUS at 02:40

## 2020-10-27 RX ADMIN — INSULIN ASPART 2 UNITS: 100 INJECTION, SOLUTION INTRAVENOUS; SUBCUTANEOUS at 12:16

## 2020-10-27 RX ADMIN — DILTIAZEM HYDROCHLORIDE 30 MG: 30 TABLET, FILM COATED ORAL at 01:39

## 2020-10-27 RX ADMIN — KETOROLAC TROMETHAMINE 20 MG: 30 INJECTION, SOLUTION INTRAMUSCULAR; INTRAVENOUS at 10:25

## 2020-10-27 RX ADMIN — DULOXETINE HYDROCHLORIDE 60 MG: 60 CAPSULE, DELAYED RELEASE ORAL at 09:54

## 2020-10-27 RX ADMIN — PIPERACILLIN SODIUM AND TAZOBACTAM SODIUM 3.38 G: 3; .375 INJECTION, POWDER, LYOPHILIZED, FOR SOLUTION INTRAVENOUS at 04:04

## 2020-10-27 RX ADMIN — PANTOPRAZOLE SODIUM 40 MG: 40 INJECTION, POWDER, FOR SOLUTION INTRAVENOUS at 09:54

## 2020-10-27 RX ADMIN — ALBUTEROL SULFATE 2.5 MG: 2.5 SOLUTION RESPIRATORY (INHALATION) at 06:38

## 2020-10-27 RX ADMIN — DILTIAZEM HYDROCHLORIDE 30 MG: 30 TABLET, FILM COATED ORAL at 18:28

## 2020-10-27 RX ADMIN — POTASSIUM CHLORIDE, DEXTROSE MONOHYDRATE AND SODIUM CHLORIDE 100 ML/HR: 150; 5; 450 INJECTION, SOLUTION INTRAVENOUS at 04:48

## 2020-10-27 RX ADMIN — ALBUTEROL SULFATE 2.5 MG: 2.5 SOLUTION RESPIRATORY (INHALATION) at 13:10

## 2020-10-27 RX ADMIN — INSULIN ASPART 2 UNITS: 100 INJECTION, SOLUTION INTRAVENOUS; SUBCUTANEOUS at 18:28

## 2020-10-27 RX ADMIN — PIPERACILLIN SODIUM AND TAZOBACTAM SODIUM 3.38 G: 3; .375 INJECTION, POWDER, LYOPHILIZED, FOR SOLUTION INTRAVENOUS at 12:16

## 2020-10-27 RX ADMIN — POTASSIUM CHLORIDE, DEXTROSE MONOHYDRATE AND SODIUM CHLORIDE 100 ML/HR: 150; 5; 450 INJECTION, SOLUTION INTRAVENOUS at 20:00

## 2020-10-27 RX ADMIN — DILTIAZEM HYDROCHLORIDE 30 MG: 30 TABLET, FILM COATED ORAL at 09:54

## 2020-10-27 RX ADMIN — PIPERACILLIN SODIUM AND TAZOBACTAM SODIUM 3.38 G: 3; .375 INJECTION, POWDER, LYOPHILIZED, FOR SOLUTION INTRAVENOUS at 20:00

## 2020-10-27 RX ADMIN — HYDROMORPHONE HYDROCHLORIDE: 2 INJECTION INTRAMUSCULAR; INTRAVENOUS; SUBCUTANEOUS at 15:35

## 2020-10-27 RX ADMIN — SODIUM CHLORIDE, PRESERVATIVE FREE 10 ML: 5 INJECTION INTRAVENOUS at 09:54

## 2020-10-27 RX ADMIN — KETOROLAC TROMETHAMINE 20 MG: 30 INJECTION, SOLUTION INTRAMUSCULAR; INTRAVENOUS at 18:34

## 2020-10-28 VITALS
TEMPERATURE: 99.8 F | HEIGHT: 66 IN | SYSTOLIC BLOOD PRESSURE: 114 MMHG | BODY MASS INDEX: 26.74 KG/M2 | RESPIRATION RATE: 20 BRPM | WEIGHT: 166.4 LBS | OXYGEN SATURATION: 90 % | HEART RATE: 104 BPM | DIASTOLIC BLOOD PRESSURE: 62 MMHG

## 2020-10-28 LAB
BH BB BLOOD EXPIRATION DATE: NORMAL
BH BB BLOOD EXPIRATION DATE: NORMAL
BH BB BLOOD TYPE BARCODE: NORMAL
BH BB BLOOD TYPE BARCODE: NORMAL
BH BB DISPENSE STATUS: NORMAL
BH BB DISPENSE STATUS: NORMAL
BH BB PRODUCT CODE: NORMAL
BH BB PRODUCT CODE: NORMAL
BH BB UNIT NUMBER: NORMAL
BH BB UNIT NUMBER: NORMAL
CROSSMATCH INTERPRETATION: NORMAL
CROSSMATCH INTERPRETATION: NORMAL
GLUCOSE BLDC GLUCOMTR-MCNC: 124 MG/DL (ref 70–130)
GLUCOSE BLDC GLUCOMTR-MCNC: 145 MG/DL (ref 70–130)
GLUCOSE BLDC GLUCOMTR-MCNC: 152 MG/DL (ref 70–130)
LAB AP CASE REPORT: NORMAL
PATH REPORT.FINAL DX SPEC: NORMAL
UNIT  ABO: NORMAL
UNIT  ABO: NORMAL
UNIT  RH: NORMAL
UNIT  RH: NORMAL

## 2020-10-28 PROCEDURE — 25010000002 ENOXAPARIN PER 10 MG: Performed by: SURGERY

## 2020-10-28 PROCEDURE — 97116 GAIT TRAINING THERAPY: CPT

## 2020-10-28 PROCEDURE — 25010000002 KETOROLAC TROMETHAMINE PER 15 MG: Performed by: SURGERY

## 2020-10-28 PROCEDURE — 97530 THERAPEUTIC ACTIVITIES: CPT

## 2020-10-28 PROCEDURE — 25010000002 HYDROMORPHONE 1 MG/ML SOLUTION: Performed by: SURGERY

## 2020-10-28 PROCEDURE — 94799 UNLISTED PULMONARY SVC/PX: CPT

## 2020-10-28 PROCEDURE — 82962 GLUCOSE BLOOD TEST: CPT

## 2020-10-28 PROCEDURE — 97535 SELF CARE MNGMENT TRAINING: CPT

## 2020-10-28 RX ORDER — BISACODYL 10 MG
10 SUPPOSITORY, RECTAL RECTAL DAILY
Status: DISCONTINUED | OUTPATIENT
Start: 2020-10-28 | End: 2020-10-28 | Stop reason: HOSPADM

## 2020-10-28 RX ORDER — HYDROMORPHONE HCL IN 0.9% NACL 0.2 MG/ML
PLASTIC BAG, INJECTION (ML) INTRAVENOUS CONTINUOUS
Status: DISCONTINUED | OUTPATIENT
Start: 2020-10-28 | End: 2020-10-28

## 2020-10-28 RX ADMIN — ENOXAPARIN SODIUM 40 MG: 40 INJECTION SUBCUTANEOUS at 08:26

## 2020-10-28 RX ADMIN — BISACODYL 10 MG: 10 SUPPOSITORY RECTAL at 09:31

## 2020-10-28 RX ADMIN — DULOXETINE HYDROCHLORIDE 60 MG: 60 CAPSULE, DELAYED RELEASE ORAL at 08:25

## 2020-10-28 RX ADMIN — SODIUM CHLORIDE, PRESERVATIVE FREE 10 ML: 5 INJECTION INTRAVENOUS at 08:23

## 2020-10-28 RX ADMIN — KETOROLAC TROMETHAMINE 20 MG: 30 INJECTION, SOLUTION INTRAMUSCULAR; INTRAVENOUS at 00:33

## 2020-10-28 RX ADMIN — BUPRENORPHINE HYDROCHLORIDE AND NALOXONE HYDROCHLORIDE DIHYDRATE 3 TABLET: 8; 2 TABLET SUBLINGUAL at 08:22

## 2020-10-28 RX ADMIN — ESCITALOPRAM OXALATE 10 MG: 10 TABLET ORAL at 08:21

## 2020-10-28 RX ADMIN — HYDROMORPHONE HYDROCHLORIDE 1 MG: 1 INJECTION, SOLUTION INTRAMUSCULAR; INTRAVENOUS; SUBCUTANEOUS at 03:17

## 2020-10-28 RX ADMIN — DILTIAZEM HYDROCHLORIDE 30 MG: 30 TABLET, FILM COATED ORAL at 11:45

## 2020-10-28 RX ADMIN — PANTOPRAZOLE SODIUM 40 MG: 40 INJECTION, POWDER, FOR SOLUTION INTRAVENOUS at 08:22

## 2020-10-28 RX ADMIN — KETOROLAC TROMETHAMINE 20 MG: 30 INJECTION, SOLUTION INTRAMUSCULAR; INTRAVENOUS at 05:56

## 2020-10-28 RX ADMIN — ALBUTEROL SULFATE 2.5 MG: 2.5 SOLUTION RESPIRATORY (INHALATION) at 06:56

## 2020-10-28 RX ADMIN — DILTIAZEM HYDROCHLORIDE 30 MG: 30 TABLET, FILM COATED ORAL at 03:17

## 2020-10-28 RX ADMIN — ALBUTEROL SULFATE 2.5 MG: 2.5 SOLUTION RESPIRATORY (INHALATION) at 00:20

## 2020-10-28 RX ADMIN — KETOROLAC TROMETHAMINE 20 MG: 30 INJECTION, SOLUTION INTRAMUSCULAR; INTRAVENOUS at 14:41

## 2020-10-29 ENCOUNTER — READMISSION MANAGEMENT (OUTPATIENT)
Dept: CALL CENTER | Facility: HOSPITAL | Age: 45
End: 2020-10-29

## 2020-10-29 ENCOUNTER — TELEPHONE (OUTPATIENT)
Dept: SURGERY | Facility: CLINIC | Age: 45
End: 2020-10-29

## 2020-10-29 LAB
BACTERIA SPEC AEROBE CULT: NORMAL
BACTERIA SPEC AEROBE CULT: NORMAL

## 2020-10-29 NOTE — OUTREACH NOTE
Prep Survey      Responses   [facility]?  [place]   Is LACE score < 7 ?  No   Eligibility  Readm Mgmt   Discharge diagnosis  Repair of a perforated gastric ulcer   Does the patient have one of the following disease processes/diagnoses(primary or secondary)?  General Surgery   Does the patient have Home health ordered?  No   Is there a DME ordered?  No   Prep survey completed?  Yes          Jaye Simpson RN

## 2020-10-29 NOTE — TELEPHONE ENCOUNTER
PT HAD SURGERY FOR GASTRIC ULCER.      SAYS SHE WAS SENT HOME WITH NO PAIN MEDICINE.      REQUESTING PAIN MEDS.      SHE USES CLINIC PHARM IN [place]       PT PH# [phone]

## 2020-11-02 ENCOUNTER — READMISSION MANAGEMENT (OUTPATIENT)
Dept: CALL CENTER | Facility: HOSPITAL | Age: 45
End: 2020-11-02

## 2020-11-02 NOTE — OUTREACH NOTE
General Surgery Week 1 Survey      Responses   [facility] patient discharged from?  [place]   Does the patient have one of the following disease processes/diagnoses(primary or secondary)?  General Surgery   Week 1 attempt successful?  No   Unsuccessful attempts  Attempt 1          Chrissy Otoole RN

## 2020-11-04 ENCOUNTER — READMISSION MANAGEMENT (OUTPATIENT)
Dept: CALL CENTER | Facility: HOSPITAL | Age: 45
End: 2020-11-04

## 2020-11-04 ENCOUNTER — OFFICE VISIT (OUTPATIENT)
Dept: SURGERY | Facility: CLINIC | Age: 45
End: 2020-11-04

## 2020-11-04 VITALS
HEART RATE: 118 BPM | WEIGHT: 151 LBS | DIASTOLIC BLOOD PRESSURE: 86 MMHG | TEMPERATURE: 98 F | BODY MASS INDEX: 24.27 KG/M2 | OXYGEN SATURATION: 97 % | SYSTOLIC BLOOD PRESSURE: 115 MMHG | HEIGHT: 66 IN

## 2020-11-04 DIAGNOSIS — Z98.890 S/P EXPLORATORY LAPAROTOMY: Primary | ICD-10-CM

## 2020-11-04 PROCEDURE — 99024 POSTOP FOLLOW-UP VISIT: CPT | Performed by: NURSE PRACTITIONER

## 2020-11-04 NOTE — PROGRESS NOTES
CHIEF COMPLAINT:   Chief Complaint   Patient presents with   • Post-op Follow-up     Laparotomy Exploratory and Closure of Gastric Ulcer       HPI: This patient is seen post-operatively following exploratory laparotomy with [place] patch repair and biopsy of gastric ulcer with gastrostomy tube placement.  Patient is doing well. She has occasional nausea. Has not had to use G tube. Having good bowel function. No problems with constipation or diarrhea. No urinary complaints. Denies fever. Ambulating well and slowly returning to normal activities.      PATHOLOGY:         PHYSICAL EXAM:    ABD: Incisions are healing well without any erythema or signs of infection. Abdomen is soft and nondistended.  G tube site clean and without signs of infection.  Staples removed and steristrips applied.      ASSESSMENT    Diagnoses and all orders for this visit:    1. S/P exploratory laparotomy (Primary)        PLAN:    1. The patient will follow-up in 2 weeks or sooner if concerns arise.                   This document has been electronically signed by LEILANI Bartholomew on November 4, 2020 13:19 CST

## 2020-11-04 NOTE — OUTREACH NOTE
General Surgery Week 1 Survey      Responses   [facility] patient discharged from?  [place]   Does the patient have one of the following disease processes/diagnoses(primary or secondary)?  General Surgery   Week 1 attempt successful?  No   Unsuccessful attempts  Attempt 2          María Corona RN

## 2020-11-10 ENCOUNTER — TELEPHONE (OUTPATIENT)
Dept: SURGERY | Facility: CLINIC | Age: 45
End: 2020-11-10

## 2020-11-10 NOTE — TELEPHONE ENCOUNTER
WOULD LIKE TO TALK TO YOU ABOUT REMOVING G TUBE EARLY.      NOT HAVING ANY PRBS AND NOT EVEN HAVING TO FLUSH IT.      SHE IS AFRAID SHE'S GOING TO PULL IT OUT.      PH# [phone]

## 2020-11-11 ENCOUNTER — READMISSION MANAGEMENT (OUTPATIENT)
Dept: CALL CENTER | Facility: HOSPITAL | Age: 45
End: 2020-11-11

## 2020-11-11 NOTE — OUTREACH NOTE
General Surgery Week 2 Survey      Responses   [facility] patient discharged from?  [place]   Does the patient have one of the following disease processes/diagnoses(primary or secondary)?  General Surgery   Week 2 attempt successful?  No   Unsuccessful attempts  Attempt 1          Jeanie Bae RN

## 2020-11-13 ENCOUNTER — READMISSION MANAGEMENT (OUTPATIENT)
Dept: CALL CENTER | Facility: HOSPITAL | Age: 45
End: 2020-11-13

## 2020-11-13 NOTE — OUTREACH NOTE
General Surgery Week 2 Survey      Responses   [facility] patient discharged from?  [place]   Does the patient have one of the following disease processes/diagnoses(primary or secondary)?  General Surgery   Week 2 attempt successful?  No   Unsuccessful attempts  Attempt 2          Chrissy Otoole RN

## 2020-11-20 ENCOUNTER — READMISSION MANAGEMENT (OUTPATIENT)
Dept: CALL CENTER | Facility: HOSPITAL | Age: 45
End: 2020-11-20

## 2020-11-20 ENCOUNTER — OFFICE VISIT (OUTPATIENT)
Dept: SURGERY | Facility: CLINIC | Age: 45
End: 2020-11-20

## 2020-11-20 VITALS
TEMPERATURE: 97.4 F | HEIGHT: 63 IN | HEART RATE: 97 BPM | SYSTOLIC BLOOD PRESSURE: 118 MMHG | DIASTOLIC BLOOD PRESSURE: 72 MMHG | WEIGHT: 149.8 LBS | BODY MASS INDEX: 26.54 KG/M2

## 2020-11-20 DIAGNOSIS — K25.5 GASTRIC ULCER WITH PERFORATION, UNSPECIFIED CHRONICITY (HCC): ICD-10-CM

## 2020-11-20 DIAGNOSIS — Z98.890 S/P EXPLORATORY LAPAROTOMY: Primary | ICD-10-CM

## 2020-11-20 PROCEDURE — 99024 POSTOP FOLLOW-UP VISIT: CPT | Performed by: NURSE PRACTITIONER

## 2020-11-20 RX ORDER — PREDNISONE 1 MG/1
5 TABLET ORAL 2 TIMES DAILY
COMMUNITY
End: 2021-10-05

## 2020-11-20 RX ORDER — ROSUVASTATIN CALCIUM 20 MG/1
20 TABLET, COATED ORAL DAILY
COMMUNITY
Start: 2020-11-09

## 2020-11-20 RX ORDER — HYDROXYZINE 50 MG/1
50 TABLET, FILM COATED ORAL AS NEEDED
COMMUNITY
End: 2022-09-12

## 2020-11-20 RX ORDER — PANTOPRAZOLE SODIUM 40 MG/1
40 TABLET, DELAYED RELEASE ORAL DAILY
COMMUNITY
Start: 2020-11-13 | End: 2021-11-22

## 2020-11-20 RX ORDER — INSULIN GLARGINE 100 [IU]/ML
50 INJECTION, SOLUTION SUBCUTANEOUS NIGHTLY
COMMUNITY
Start: 2020-10-19 | End: 2022-03-29 | Stop reason: SDUPTHER

## 2020-11-20 NOTE — PROGRESS NOTES
"CHIEF COMPLAINT:   Chief Complaint   Patient presents with   • Follow-up     Laparotomy Exploratory and Closure of Gastric Ulcer       HPI: This patient is seen post-operatively following exploratory laparotomy and judi patch repair with G tube placement.  Patient  is doing well. Eating well and states a couple episodes of nausea but nothing persisitent. Having good bowel function. No problems with constipation or diarrhea. No urinary complaints. Denies fever. Ambulating well and slowly returning to normal activities. She states she has not used her G-tube any and states  \"if you do not take this tube out today, I am taking it out myself tonight when I get home\".  Had previously discussed with her that that Dr. Goldberg wanted her to keep G-tube in for at least one month postoperatively. Will set her up to see someone with GI per her request.     PATHOLOGY:         PHYSICAL EXAM:    ABD: Incisions are healing well without any erythema or signs of infection. Discussed removal of gtube with Dr. Goldberg and he states it was okay to remove tube at today's appointment. G-tube was removed and patient tolerated well. There was a large amount of bile appearing drainage after removal. Dr. De Leon also present and assessed patient's drainage. Drainage had ceased prior to patient leaving office and appropriate dressing applied.      ASSESSMENT    Diagnoses and all orders for this visit:    1. S/P exploratory laparotomy (Primary)    2. Gastric ulcer with perforation, unspecified chronicity (CMS/HCC)  -     Ambulatory Referral to Gastroenterology        PLAN:    1. The patient will follow-up in 2 weeks or sooner if needed.   2. Instructed to leave dressing to G-tube in place for 24 hours and may reinforce if necessary. If excessive output or other symptoms occur, instructed to seek further medical evaluation and treatment. May shower tomorrow evening.    3. Follow up with gastroenterology as directed.                 This document " has been electronically signed by LEILANI Bartholomew on November 20, 2020 15:48 CST

## 2020-11-20 NOTE — PATIENT INSTRUCTIONS
"BMI for Adults  What is BMI?  Body mass index (BMI) is a number that is calculated from a person's weight and height. BMI can help estimate how much of a person's weight is composed of fat. BMI does not measure body fat directly. Rather, it is an alternative to procedures that directly measure body fat, which can be difficult and expensive.  BMI can help identify people who may be at higher risk for certain medical problems.  What are BMI measurements used for?  BMI is used as a screening tool to identify possible weight problems. It helps determine whether a person is obese, overweight, a healthy weight, or underweight.  BMI is useful for:  · Identifying a weight problem that may be related to a medical condition or may increase the risk for medical problems.  · Promoting changes, such as changes in diet and exercise, to help reach a healthy weight. BMI screening can be repeated to see if these changes are working.  How is BMI calculated?  BMI involves measuring your weight in relation to your height. Both height and weight are measured, and the BMI is calculated from those numbers. This can be done either in English (U[place] or metric measurements. Note that charts and online BMI calculators are available to help you find your BMI quickly and easily without having to do these calculations yourself.  To calculate your BMI in English (U.S[place] measurements:    1. Measure your weight in pounds (lb).  2. Multiply the number of pounds by 703.  ? For example, for a person who weighs 180 lb, multiply that number by 703, which equals 126,540.  3. Measure your height in inches. Then multiply that number by itself to get a measurement called \"inches squared.\"  ? For example, for a person who is 70 inches tall, the \"inches squared\" measurement is 70 inches x 70 inches, which equals 4,900 inches squared.  4. Divide the total from step 2 (number of lb x 703) by the total from step 3 (inches squared): 126,540 ÷ 4,900 = 25.8. This is " "your BMI.  To calculate your BMI in metric measurements:  1. Measure your weight in kilograms (kg).  2. Measure your height in meters (m). Then multiply that number by itself to get a measurement called \"meters squared.\"  ? For example, for a person who is 1.75 m tall, the \"meters squared\" measurement is 1.75 m x 1.75 m, which is equal to 3.1 meters squared.  3. Divide the number of kilograms (your weight) by the meters squared number. In this example: 70 ÷ 3.1 = 22.6. This is your BMI.  What do the results mean?  BMI charts are used to identify whether you are underweight, normal weight, overweight, or obese. The following guidelines will be used:  · Underweight: BMI less than 18.5.  · Normal weight: BMI between 18.5 and 24.9.  · Overweight: BMI between 25 and 29.9.  · Obese: BMI of 30 or above.  Keep these notes in mind:  · Weight includes both fat and muscle, so someone with a muscular build, such as an athlete, may have a BMI that is higher than 24.9. In cases like these, BMI is not an accurate measure of body fat.  · To determine if excess body fat is the cause of a BMI of 25 or higher, further assessments may need to be done by a health care provider.  · BMI is usually interpreted in the same way for men and women.  Where to find more information  For more information about BMI, including tools to quickly calculate your BMI, go to these websites:  · Centers for Disease Control and Prevention: www.cdc.gov  · American Heart Association: www.heart.org  · National Heart, Lung, and Blood [place]: www.nhlbi.nih.gov  Summary  · Body mass index (BMI) is a number that is calculated from a person's weight and height.  · BMI may help estimate how much of a person's weight is composed of fat. BMI can help identify those who may be at higher risk for certain medical problems.  · BMI can be measured using English measurements or metric measurements.  · BMI charts are used to identify whether you are underweight, normal " weight, overweight, or obese.  This information is not intended to replace advice given to you by your health care provider. Make sure you discuss any questions you have with your health care provider.  Document Released: 08/29/2005 Document Revised: 09/09/2020 Document Reviewed: 07/17/2020  Elsevier Patient Education © 2020 [employer]

## 2020-11-20 NOTE — OUTREACH NOTE
General Surgery Week 3 Survey      Responses   [facility] patient discharged from?  [place]   Does the patient have one of the following disease processes/diagnoses(primary or secondary)?  General Surgery   Week 3 attempt successful?  No   Unsuccessful attempts  Attempt 1          Tuyet Mike RN

## 2020-11-25 ENCOUNTER — TELEMEDICINE (OUTPATIENT)
Dept: ENDOCRINOLOGY | Facility: CLINIC | Age: 45
End: 2020-11-25

## 2020-11-25 DIAGNOSIS — E27.49 SECONDARY ADRENAL INSUFFICIENCY (HCC): ICD-10-CM

## 2020-11-25 DIAGNOSIS — E09.65 DRUG OR CHEMICAL INDUCED DIABETES MELLITUS WITH HYPERGLYCEMIA, WITH LONG-TERM CURRENT USE OF INSULIN (HCC): Primary | ICD-10-CM

## 2020-11-25 DIAGNOSIS — Z79.4 DRUG OR CHEMICAL INDUCED DIABETES MELLITUS WITH HYPERGLYCEMIA, WITH LONG-TERM CURRENT USE OF INSULIN (HCC): Primary | ICD-10-CM

## 2020-11-25 DIAGNOSIS — R21 RASH: ICD-10-CM

## 2020-11-25 PROCEDURE — 99214 OFFICE O/P EST MOD 30 MIN: CPT | Performed by: NURSE PRACTITIONER

## 2020-11-25 NOTE — PROGRESS NOTES
Subjective    Jeanie Jones is a 45 y.o. female. she is here today for follow-up.    History of Present Illness       Primary Care Provider     Parth Salas APRN     45-year-old female comes for follow up         Reason diabetes induced by steroid use,        Duration-- 2 years      Quality improving control      Severity moderate      Timing constant      Quantity            Lab Results   Component Value Date     HGBA1C 9.8 (H) 2020         Symptoms -- none      Alleviating factors -- compliance      Aggravating factors steroid use      Blood glucose readings      She checks 4 times daily and has been for over 90 days      Now on Tracee personal use       Time in target 73%     Above target 22%     ---------------------------------------------------------------------------        RASH      Has been treated with prednisone for about 5 years      Rash is on the arms and legs     It is widespread, she does have itching      alleviating factors -- steroids and atarax      Modifying factors include the use of steroids and Atarax              The following portions of the patient's history were reviewed and updated as appropriate:   Past Medical History:   Diagnosis Date   • Anxiety    • Depression    • Diabetes mellitus (CMS/[facility])    • GERD (gastroesophageal reflux disease)     OCCASIONALLY   • Kidney stones    • Low back pain    • MRSA infection    • Multiple joint pain    • Other long term (current) drug therapy    • Pancreatitis      Past Surgical History:   Procedure Laterality Date   • ADENOIDECTOMY     • APPENDECTOMY     •  SECTION     • CHOLECYSTECTOMY     • CYSTOSCOPY, URETEROSCOPY, RETROGRADE PYELOGRAM, STENT INSERTION Right 2020    Procedure: CYSTOSCOPY, RIGHT RETROGRAE, URETEROSCOPY, LASER LITHOTRIPSY, STENT PLACEMENT  LATEX ALLERGY;  Surgeon: Glasgow, Bakari CLAYTON MD;  [facility]: [facility];  Service: Urology;  Laterality: Right;   • EXPLORATORY LAPAROTOMY N/A 10/24/2020     Procedure: LAPAROTOMY EXPLORATORY AND CLOSURE OF GASTRIC ULCER,;  Surgeon: Valeriano Goldberg MD;  Location: [facility];  Service: General;  Laterality: N/A;   • FRACTURE SURGERY Left     LLE with plates and screws   • HYSTERECTOMY     • INJECTION OF MEDICATION  2015    Toradol (1)      • TONSILLECTOMY       Family History   Problem Relation Age of Onset   • Rheum arthritis Mother    • Cancer Mother         lung cancer   • Heart disease Mother      OB History        1    Para   0    Term   0       0    AB   0    Living   1       SAB   0    TAB   0    Ectopic   0    Molar        Multiple   0    Live Births                  Current Outpatient Medications   Medication Sig Dispense Refill   • buprenorphine-naloxone (SUBOXONE) 8-2 MG per SL tablet Place 3 tablets under the tongue Daily.  0   • Cholecalciferol 1.25 MG (22808 UT) tablet 50 thousand units po q 2 weeks 2 tablet 11   • coenzyme Q10 50 MG capsule capsule Take 50 mg by mouth Daily.     • Continuous Blood Gluc  (FreeStyle Tracee 2 [place] Systm) device 1 each Continuous. Use as indicated for glucose monitoring 1 each 1   • Continuous Blood Gluc Sensor (FreeStyle Tracee 2 Sensor Systm) misc 1 each Every 14 (Fourteen) Days. Use every 14 days 2 each 11   • dilTIAZem (CARDIZEM) 30 MG tablet Take 1 tablet by mouth 3 (Three) Times a Day. 90 tablet 1   • DULoxetine (CYMBALTA) 60 MG capsule Take 60 mg by mouth Daily.     • escitalopram (LEXAPRO) 10 MG tablet Take 10 mg by mouth Daily.     • famotidine (PEPCID) 40 MG tablet Take 40 mg by mouth Daily.     • fluconazole (DIFLUCAN) 150 MG tablet Take 1 tablet day of infection and 1 tablet 3 days later 2 tablet 6   • glucagon (Glucagon Emergency) 1 MG injection Inject 1 mg under the skin into the appropriate area as directed 1 (One) Time As Needed for Low Blood Sugar for up to 1 dose. 1 kit 6   • hydrOXYzine (ATARAX) 50 MG tablet Take 50 mg by mouth As Needed.     • Insulin Glargine (BASAGLAR KWIKPEN) 100  UNIT/ML injection pen 50 units qhs 5 pen 11   • Insulin Lispro, 1 Unit Dial, (HumaLOG KwikPen) 100 UNIT/ML solution pen-injector Use up to 20 units 3 times a day with meals 6 pen 11   • Insulin Pen Needle (PEN NEEDLES) 32G X 4 MM misc 1 each 4 (Four) Times a Day. Use 4 x daily, Dx code E11.65 120 each 11   • Invokana 100 MG tablet TAKE 1 TABLET BY MOUTH EVERY DAY 30 tablet 5   • Lantus SoloStar 100 UNIT/ML injection pen      • lisinopril (PRINIVIL,ZESTRIL) 2.5 MG tablet Take 2.5 mg by mouth Daily.     • ondansetron (ZOFRAN) 4 MG tablet Take 4 mg by mouth Every 8 (Eight) Hours As Needed for Nausea or Vomiting.     • pantoprazole (PROTONIX) 40 MG EC tablet Daily.     • predniSONE (DELTASONE) 5 MG tablet Take 5 mg by mouth 2 (Two) Times a Day.     • rosuvastatin (CRESTOR) 20 MG tablet Daily.     • rosuvastatin (CRESTOR) 5 MG tablet Take 1 tablet by mouth Every Night. (Patient taking differently: Take 20 mg by mouth Every Night.) 30 tablet 5   • Semaglutide,0.25 or 0.5MG/DOS, (Ozempic, 0.25 or 0.5 MG/DOSE,) 2 MG/1.5ML solution pen-injector Inject 0.5 mg under the skin into the appropriate area as directed 1 (One) Time Per Week. 3 pen 11   • tiZANidine (ZANAFLEX) 4 MG tablet Take 4 mg by mouth Every 8 (Eight) Hours As Needed for Muscle Spasms.       No current facility-administered medications for this visit.      Allergies   Allergen Reactions   • Adhesive Tape Unknown - Low Severity     Tares skin    • Bactrim [Sulfamethoxazole-Trimethoprim] Hives   • Ciprofloxacin Hives   • Latex Rash     Only when pt wears latex gloves      Social History     Socioeconomic History   • Marital status: Single     Spouse name: Not on file   • Number of children: 1   • Years of education: Not on file   • Highest education level: Not on file   Tobacco Use   • Smoking status: Current Every Day Smoker     Packs/day: 0.50     Years: 20.00     Pack years: 10.00     Types: Cigarettes   • Smokeless tobacco: Never Used   Substance and Sexual  Activity   • Alcohol use: No   • Drug use: No   • Sexual activity: Defer       Review of Systems  Review of Systems   Constitutional: Negative for activity change, appetite change, diaphoresis and fatigue.   HENT: Negative for facial swelling, sneezing, sore throat, tinnitus, trouble swallowing and voice change.    Eyes: Negative for photophobia, pain, discharge, redness, itching and visual disturbance.   Respiratory: Negative for apnea, cough, choking, chest tightness and shortness of breath.    Cardiovascular: Negative for chest pain, palpitations and leg swelling.   Gastrointestinal: Negative for abdominal distention, abdominal pain, constipation, diarrhea, nausea and vomiting.   Endocrine: Negative for cold intolerance, heat intolerance, polydipsia, polyphagia and polyuria.   Genitourinary: Negative for difficulty urinating, dysuria, frequency, hematuria and urgency.   Musculoskeletal: Negative for arthralgias, back pain, gait problem, joint swelling, myalgias, neck pain and neck stiffness.   Skin: Positive for rash. Negative for color change, pallor and wound.   Neurological: Negative for dizziness, tremors, weakness, light-headedness, numbness and headaches.   Hematological: Negative for adenopathy. Does not bruise/bleed easily.   Psychiatric/Behavioral: Negative for behavioral problems, confusion and sleep disturbance.        Objective    There were no vitals taken for this visit.  Physical Exam  Constitutional:       Appearance: Normal appearance.   HENT:      Head: Normocephalic and atraumatic.      Right Ear: External ear normal.      Left Ear: External ear normal.      Nose: Nose normal.   Eyes:      Extraocular Movements: Extraocular movements intact.      Conjunctiva/sclera: Conjunctivae normal.      Pupils: Pupils are equal, round, and reactive to light.   Neck:      Musculoskeletal: Normal range of motion.   Pulmonary:      Effort: Pulmonary effort is normal.   Skin:     Findings: Rash present.    Neurological:      General: No focal deficit present.      Mental Status: She is alert and oriented to person, place, and time.   Psychiatric:         Mood and Affect: Mood normal.         Behavior: Behavior normal.         Thought Content: Thought content normal.         Judgment: Judgment normal.         Lab Review  Glucose (mg/dL)   Date Value   10/26/2020 62 (L)   10/25/2020 174 (H)   10/24/2020 166 (H)     Glucose, Arterial (mmol/L)   Date Value   10/24/2020 137 (H)   10/24/2020 120 (H)     Sodium (mmol/L)   Date Value   10/26/2020 139   10/25/2020 136   10/24/2020 137     Potassium (mmol/L)   Date Value   10/27/2020 3.4 (L)   10/26/2020 3.0 (L)   10/25/2020 3.9     Chloride (mmol/L)   Date Value   10/26/2020 99   10/25/2020 102   10/24/2020 103     CO2 (mmol/L)   Date Value   10/26/2020 29.0   10/25/2020 22.0   10/24/2020 22.0     BUN (mg/dL)   Date Value   10/26/2020 12   10/25/2020 10   10/24/2020 9     Creatinine (mg/dL)   Date Value   10/26/2020 0.64   10/25/2020 0.52 (L)   10/24/2020 0.54 (L)     Hemoglobin A1C (%)   Date Value   07/28/2020 9.8 (H)   03/10/2020 7.0 (H)   01/16/2020 6.9 (H)     Triglycerides (mg/dL)   Date Value   07/28/2020 299 (H)   12/06/2019 156 (H)   05/13/2019 210 (H)     LDL Cholesterol  (mg/dL)   Date Value   07/28/2020 72   12/06/2019 183 (H)   05/13/2019 75       Assessment/Plan      1. 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)    2. Secondary adrenal insufficiency (CMS/[facility])    3. Rash    .    Medications prescribed:  Outpatient Encounter Medications as of 11/25/2020   Medication Sig Dispense Refill   • buprenorphine-naloxone (SUBOXONE) 8-2 MG per SL tablet Place 3 tablets under the tongue Daily.  0   • Cholecalciferol 1.25 MG (29832 UT) tablet 50 thousand units po q 2 weeks 2 tablet 11   • coenzyme Q10 50 MG capsule capsule Take 50 mg by mouth Daily.     • Continuous Blood Gluc  (FreeStyle Tracee 2  [place] Systm) device 1 each Continuous. Use as indicated for glucose monitoring 1 each 1   • Continuous Blood Gluc Sensor (FreeStyle Tracee 2 Sensor Systm) misc 1 each Every 14 (Fourteen) Days. Use every 14 days 2 each 11   • dilTIAZem (CARDIZEM) 30 MG tablet Take 1 tablet by mouth 3 (Three) Times a Day. 90 tablet 1   • DULoxetine (CYMBALTA) 60 MG capsule Take 60 mg by mouth Daily.     • escitalopram (LEXAPRO) 10 MG tablet Take 10 mg by mouth Daily.     • famotidine (PEPCID) 40 MG tablet Take 40 mg by mouth Daily.     • fluconazole (DIFLUCAN) 150 MG tablet Take 1 tablet day of infection and 1 tablet 3 days later 2 tablet 6   • glucagon (Glucagon Emergency) 1 MG injection Inject 1 mg under the skin into the appropriate area as directed 1 (One) Time As Needed for Low Blood Sugar for up to 1 dose. 1 kit 6   • hydrOXYzine (ATARAX) 50 MG tablet Take 50 mg by mouth As Needed.     • Insulin Glargine (BASAGLAR KWIKPEN) 100 UNIT/ML injection pen 50 units qhs 5 pen 11   • Insulin Lispro, 1 Unit Dial, (HumaLOG KwikPen) 100 UNIT/ML solution pen-injector Use up to 20 units 3 times a day with meals 6 pen 11   • Insulin Pen Needle (PEN NEEDLES) 32G X 4 MM misc 1 each 4 (Four) Times a Day. Use 4 x daily, Dx code E11.65 120 each 11   • Invokana 100 MG tablet TAKE 1 TABLET BY MOUTH EVERY DAY 30 tablet 5   • Lantus SoloStar 100 UNIT/ML injection pen      • lisinopril (PRINIVIL,ZESTRIL) 2.5 MG tablet Take 2.5 mg by mouth Daily.     • ondansetron (ZOFRAN) 4 MG tablet Take 4 mg by mouth Every 8 (Eight) Hours As Needed for Nausea or Vomiting.     • pantoprazole (PROTONIX) 40 MG EC tablet Daily.     • predniSONE (DELTASONE) 5 MG tablet Take 5 mg by mouth 2 (Two) Times a Day.     • rosuvastatin (CRESTOR) 20 MG tablet Daily.     • rosuvastatin (CRESTOR) 5 MG tablet Take 1 tablet by mouth Every Night. (Patient taking differently: Take 20 mg by mouth Every Night.) 30 tablet 5   • Semaglutide,0.25 or 0.5MG/DOS, (Ozempic, 0.25 or 0.5 MG/DOSE,) 2  MG/1.5ML solution pen-injector Inject 0.5 mg under the skin into the appropriate area as directed 1 (One) Time Per Week. 3 pen 11   • tiZANidine (ZANAFLEX) 4 MG tablet Take 4 mg by mouth Every 8 (Eight) Hours As Needed for Muscle Spasms.       No facility-administered encounter medications on file as of 11/25/2020.        Orders placed during this encounter include:  No orders of the defined types were placed in this encounter.    Rash --- REFERRED  TO  DERMATOLOGY AND IMMUNOLOGY --she missed appointment due to illness    She will reschedule      Using atarax 50 mg tid --continue       Rash does not appear to be endocrine related      Component      Latest Ref Rng & Units 8/18/2020 8/20/2020   Creatinine, 24H       0.70 - 1.60 g/24 hr   0.66 (L)   Creatinine, Urine      mg/dL   36.9   Urine Volume      mL   1,800   Time (Hours)      hrs   24   5-HIAA, Urine      Undefined mg/L   3.0   5-HIAA, 24H Ur      0.0 - 14.9 mg/24 hr   5.4   Calcitonin      0.0 - 5.0 pg/mL <2.0     Insulin-Like Growth Factor-1      74 - 239 ng/mL 94     MANUEL Direct      Negative Negative     Uric Acid      2.4 - 5.7 mg/dL 2.6     C-Reactive Protein      0.00 - 0.50 mg/dL 0.36     Sed Rate      0 - 20 mm/hr 2     C-Peptide      1.1 - 4.4 ng/mL 5.4 (H)                       -----------        Cushing's medication induced / adrenal insufficiency            Prednisone           Now on 5 mg tablets and has tapered down to       taking one am and one pm      If fever , nausea or vomiting , take 100 mg IM and head to ER                    ------------     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         Taking invokana 100 mg daily       Lantus taking take 10 units      If am sugars are less than 80 decrease by 5 units            Humalog     Taking 4 units before each meals      Plus         151-200: 2units  201-250: 4 units  251-300 : 6units  Above 300 : 8 units           The goal is to have a sugar 2 hours after eating less than 180           Ozempic 0.5 mg  once weekly --keep taking      She now has a Roseonly personal system    She uses the Rose[employer] data to adjust her insulin dosages                              ---              4. Follow-up: Return in about 3 months (around 2/25/2021) for Recheck.          We spent 25 minutes including reviewing the patients electronic chart, reviewing medications, reviewing labs, active problems    I provided advice regarding diabetes management, dietary changes, adjustments of medication, self titration of insulin, refilled medications, ordering labs, future appointments    Patient was advised to contact the office if there are unanswered questions, trouble with blood glucose management, or any concerns

## 2020-12-09 ENCOUNTER — OFFICE VISIT (OUTPATIENT)
Dept: GASTROENTEROLOGY | Facility: CLINIC | Age: 45
End: 2020-12-09

## 2020-12-09 ENCOUNTER — OFFICE VISIT (OUTPATIENT)
Dept: SURGERY | Facility: CLINIC | Age: 45
End: 2020-12-09

## 2020-12-09 VITALS
HEIGHT: 63 IN | BODY MASS INDEX: 26.9 KG/M2 | DIASTOLIC BLOOD PRESSURE: 82 MMHG | HEART RATE: 102 BPM | WEIGHT: 151.8 LBS | SYSTOLIC BLOOD PRESSURE: 125 MMHG

## 2020-12-09 VITALS
WEIGHT: 152.2 LBS | HEIGHT: 63 IN | HEART RATE: 107 BPM | TEMPERATURE: 97.4 F | BODY MASS INDEX: 26.97 KG/M2 | DIASTOLIC BLOOD PRESSURE: 86 MMHG | SYSTOLIC BLOOD PRESSURE: 132 MMHG

## 2020-12-09 DIAGNOSIS — Z98.890 S/P EXPLORATORY LAPAROTOMY: Primary | ICD-10-CM

## 2020-12-09 DIAGNOSIS — K21.00 GASTROESOPHAGEAL REFLUX DISEASE WITH ESOPHAGITIS WITHOUT HEMORRHAGE: ICD-10-CM

## 2020-12-09 DIAGNOSIS — K25.5 CHRONIC GASTRIC ULCER WITH PERFORATION (HCC): Primary | ICD-10-CM

## 2020-12-09 PROCEDURE — 99213 OFFICE O/P EST LOW 20 MIN: CPT | Performed by: NURSE PRACTITIONER

## 2020-12-09 PROCEDURE — 99024 POSTOP FOLLOW-UP VISIT: CPT | Performed by: NURSE PRACTITIONER

## 2020-12-09 NOTE — PATIENT INSTRUCTIONS
"BMI for Adults  What is BMI?  Body mass index (BMI) is a number that is calculated from a person's weight and height. BMI can help estimate how much of a person's weight is composed of fat. BMI does not measure body fat directly. Rather, it is an alternative to procedures that directly measure body fat, which can be difficult and expensive.  BMI can help identify people who may be at higher risk for certain medical problems.  What are BMI measurements used for?  BMI is used as a screening tool to identify possible weight problems. It helps determine whether a person is obese, overweight, a healthy weight, or underweight.  BMI is useful for:  · Identifying a weight problem that may be related to a medical condition or may increase the risk for medical problems.  · Promoting changes, such as changes in diet and exercise, to help reach a healthy weight. BMI screening can be repeated to see if these changes are working.  How is BMI calculated?  BMI involves measuring your weight in relation to your height. Both height and weight are measured, and the BMI is calculated from those numbers. This can be done either in English ([place] or metric measurements. Note that charts and online BMI calculators are available to help you find your BMI quickly and easily without having to do these calculations yourself.  To calculate your BMI in English (U[place] measurements:    1. Measure your weight in pounds (lb).  2. Multiply the number of pounds by 703.  ? For example, for a person who weighs 180 lb, multiply that number by 703, which equals 126,540.  3. Measure your height in inches. Then multiply that number by itself to get a measurement called \"inches squared.\"  ? For example, for a person who is 70 inches tall, the \"inches squared\" measurement is 70 inches x 70 inches, which equals 4,900 inches squared.  4. Divide the total from step 2 (number of lb x 703) by the total from step 3 (inches squared): 126,540 ÷ 4,900 = 25.8. This is " "your BMI.  To calculate your BMI in metric measurements:  1. Measure your weight in kilograms (kg).  2. Measure your height in meters (m). Then multiply that number by itself to get a measurement called \"meters squared.\"  ? For example, for a person who is 1.75 m tall, the \"meters squared\" measurement is 1.75 m x 1.75 m, which is equal to 3.1 meters squared.  3. Divide the number of kilograms (your weight) by the meters squared number. In this example: 70 ÷ 3.1 = 22.6. This is your BMI.  What do the results mean?  BMI charts are used to identify whether you are underweight, normal weight, overweight, or obese. The following guidelines will be used:  · Underweight: BMI less than 18.5.  · Normal weight: BMI between 18.5 and 24.9.  · Overweight: BMI between 25 and 29.9.  · Obese: BMI of 30 or above.  Keep these notes in mind:  · Weight includes both fat and muscle, so someone with a muscular build, such as an athlete, may have a BMI that is higher than 24.9. In cases like these, BMI is not an accurate measure of body fat.  · To determine if excess body fat is the cause of a BMI of 25 or higher, further assessments may need to be done by a health care provider.  · BMI is usually interpreted in the same way for men and women.  Where to find more information  For more information about BMI, including tools to quickly calculate your BMI, go to these websites:  · Centers for Disease Control and Prevention: www.cdc.gov  · American Heart Association: www.heart.org  · National Heart, Lung, and Blood [place]: www.nhlbi.nih.gov  Summary  · Body mass index (BMI) is a number that is calculated from a person's weight and height.  · BMI may help estimate how much of a person's weight is composed of fat. BMI can help identify those who may be at higher risk for certain medical problems.  · BMI can be measured using English measurements or metric measurements.  · BMI charts are used to identify whether you are underweight, normal " weight, overweight, or obese.  This information is not intended to replace advice given to you by your health care provider. Make sure you discuss any questions you have with your health care provider.  Document Revised: 09/09/2020 Document Reviewed: 07/17/2020  Elsevier Patient Education © 2020 [employer]

## 2020-12-09 NOTE — PROGRESS NOTES
Chief Complaint   Patient presents with   • Gastric Ulcer       Subjective    Jeanie Jones is a 45 y.o. female. she is here today for follow-up.    Heartburn  She complains of abdominal pain and a hoarse voice. She reports no belching, no chest pain, no choking, no coughing, no dysphagia, no early satiety, no globus sensation, no heartburn or no nausea. This is a chronic problem. The problem occurs rarely. She has tried a PPI for the symptoms. The treatment provided significant relief. Past procedures include an EGD.   Peptic Ulcer Disease  Associated symptoms include abdominal pain. Pertinent negatives include no chest pain, coughing or nausea.   Patient reports she has done very well since surgery and symptoms are greatly improved with PPI and H2 blocker daily avoid gastric irritants.  Denies any recent nausea vomiting or abdominal pain at all.  States about 28 years ago she was followed by Dr. Calli Perez in [place] for ulcers and until recently had not had any issues since.         The following portions of the patient's history were reviewed and updated as appropriate:   Past Medical History:   Diagnosis Date   • Anxiety    • Depression    • Diabetes mellitus (CMS/HCC)    • GERD (gastroesophageal reflux disease)     OCCASIONALLY   • Kidney stones    • Low back pain    • MRSA infection    • Multiple joint pain    • Other long term (current) drug therapy    • Pancreatitis      Past Surgical History:   Procedure Laterality Date   • ADENOIDECTOMY     • APPENDECTOMY     •  SECTION     • CHOLECYSTECTOMY     • CYSTOSCOPY, URETEROSCOPY, RETROGRADE PYELOGRAM, STENT INSERTION Right 2020    Procedure: CYSTOSCOPY, RIGHT RETROGRAE, URETEROSCOPY, LASER LITHOTRIPSY, STENT PLACEMENT  LATEX ALLERGY;  Surgeon: [place], Bakari CLAYTON MD;  Location: [facility];  Service: Urology;  Laterality: Right;   • EXPLORATORY LAPAROTOMY N/A 10/24/2020    Procedure: LAPAROTOMY EXPLORATORY AND CLOSURE OF GASTRIC ULCER,;   Surgeon: Valeriano Goldberg MD;  Location[facility];  Service: General;  Laterality: N/A;   • FRACTURE SURGERY Left     LLE with plates and screws   • HYSTERECTOMY     • INJECTION OF MEDICATION  2015    Toradol (1)      • TONSILLECTOMY       Family History   Problem Relation Age of Onset   • Rheum arthritis Mother    • Cancer Mother         lung cancer   • Heart disease Mother      OB History        1    Para   0    Term   0       0    AB   0    Living   1       SAB   0    TAB   0    Ectopic   0    Molar        Multiple   0    Live Births                  Prior to Admission medications    Medication Sig Start Date End Date Taking? Authorizing Provider   buprenorphine-naloxone (SUBOXONE) 8-2 MG per SL tablet Place 3 tablets under the tongue Daily. 17  Yes Renae Perla MD   Cholecalciferol 1.25 MG (63124 UT) tablet 50 thousand units po q 2 weeks 20  Yes Raza Verduzco MD   coenzyme Q10 50 MG capsule capsule Take 50 mg by mouth Daily.   Yes Renae Perla MD   Continuous Blood Gluc  (FreeStyle Tracee 2 [place] Systm) device 1 each Continuous. Use as indicated for glucose monitoring 20  Yes Dano Wong APRN   Continuous Blood Gluc Sensor (FreeStyle Tracee 2 Sensor Systm) misc 1 each Every 14 (Fourteen) Days. Use every 14 days 20  Yes Dano Wong APRN   dilTIAZem (CARDIZEM) 30 MG tablet Take 1 tablet by mouth 3 (Three) Times a Day. 10/28/20  Yes Valeriano Goldberg MD   DULoxetine (CYMBALTA) 60 MG capsule Take 60 mg by mouth Daily.   Yes Renae Perla MD   escitalopram (LEXAPRO) 10 MG tablet Take 10 mg by mouth Daily.   Yes Renae Perla MD   famotidine (PEPCID) 40 MG tablet Take 40 mg by mouth Daily.   Yes Renae Perla MD   fluconazole (DIFLUCAN) 150 MG tablet Take 1 tablet day of infection and 1 tablet 3 days later 20  Yes Raza Verduzco MD   glucagon (Glucagon Emergency) 1 MG injection  Inject 1 mg under the skin into the appropriate area as directed 1 (One) Time As Needed for Low Blood Sugar for up to 1 dose. 8/18/20  Yes Raza Verduzco MD   hydrOXYzine (ATARAX) 50 MG tablet Take 50 mg by mouth As Needed.   Yes Renae Perla MD   Insulin Glargine (BASAGLAR KWIKPEN) 100 UNIT/ML injection pen 50 units qhs 8/18/20  Yes Raza Verduzco MD   Insulin Lispro, 1 Unit Dial, (HumaLOG KwikPen) 100 UNIT/ML solution pen-injector Use up to 20 units 3 times a day with meals 8/20/20  Yes Raza Verduzco MD   Insulin Pen Needle (PEN NEEDLES) 32G X 4 MM misc 1 each 4 (Four) Times a Day. Use 4 x daily, Dx code E11.65 8/18/20  Yes Raza Verduzco MD   Invokana 100 MG tablet TAKE 1 TABLET BY MOUTH EVERY DAY 10/21/20  Yes Dano Wong APRN   Lantus SoloStar 100 UNIT/ML injection pen  10/19/20  Yes Renae Perla MD   lisinopril (PRINIVIL,ZESTRIL) 2.5 MG tablet Take 2.5 mg by mouth Daily.   Yes Renae Perla MD   ondansetron (ZOFRAN) 4 MG tablet Take 4 mg by mouth Every 8 (Eight) Hours As Needed for Nausea or Vomiting.   Yes Renae Perla MD   pantoprazole (PROTONIX) 40 MG EC tablet Daily. 11/13/20  Yes Renae Perla MD   predniSONE (DELTASONE) 5 MG tablet Take 5 mg by mouth 2 (Two) Times a Day.   Yes Renae Perla MD   rosuvastatin (CRESTOR) 20 MG tablet Daily. 11/9/20  Yes Renae Perla MD   rosuvastatin (CRESTOR) 5 MG tablet Take 1 tablet by mouth Every Night.  Patient taking differently: Take 20 mg by mouth Every Night. 4/9/18  Yes Parth Salas APRN   Semaglutide,0.25 or 0.5MG/DOS, (Ozempic, 0.25 or 0.5 MG/DOSE,) 2 MG/1.5ML solution pen-injector Inject 0.5 mg under the skin into the appropriate area as directed 1 (One) Time Per Week. 8/28/20  Yes Dano Wong APRN   tiZANidine (ZANAFLEX) 4 MG tablet Take 4 mg by mouth Every 8 (Eight) Hours As Needed for Muscle Spasms.   Yes Provider, Historical,  "MD     Allergies   Allergen Reactions   • Adhesive Tape Unknown - Low Severity     Tares skin    • Bactrim [Sulfamethoxazole-Trimethoprim] Hives   • Ciprofloxacin Hives   • Latex Rash     Only when pt wears latex gloves      Social History     Socioeconomic History   • Marital status: Single     Spouse name: Not on file   • Number of children: 1   • Years of education: Not on file   • Highest education level: Not on file   Tobacco Use   • Smoking status: Current Every Day Smoker     Packs/day: 0.50     Years: 20.00     Pack years: 10.00     Types: Cigarettes   • Smokeless tobacco: Never Used   Substance and Sexual Activity   • Alcohol use: No   • Drug use: No   • Sexual activity: Defer       Review of Systems  Review of Systems   HENT: Positive for hoarse voice.    Respiratory: Negative for cough and choking.    Cardiovascular: Negative for chest pain.   Gastrointestinal: Positive for abdominal pain. Negative for dysphagia, heartburn and nausea.        /82   Pulse 102   Ht 160 cm (63\")   Wt 68.9 kg (151 lb 12.8 oz)   BMI 26.89 kg/m²     Objective    Physical Exam  Abdominal:      General: Bowel sounds are normal.      Palpations: Abdomen is soft.      Tenderness: There is no abdominal tenderness (healing mid line incision ).       No results displayed because visit has over 200 results.        Assessment/Plan      1. Chronic gastric ulcer with perforation (CMS/HCC)    2. Gastroesophageal reflux disease with esophagitis without hemorrhage    .   Continue PPI daily will plan repeat EGD in 3 to 6 months.  Return office sooner if needed    Orders placed during this encounter include:  No orders of the defined types were placed in this encounter.      * Surgery not found *    Review and/or summary of lab tests, radiology, procedures, medications. Review and summary of old records and obtaining of history. The risks and benefits of my recommendations, as well as other treatment options were discussed with the " patient today. Questions were answered.    No orders of the defined types were placed in this encounter.      Follow-up: Return in about 3 months (around 3/9/2021).          This document has been electronically signed by LEILANI Rouse on December 10, 2020 16:51 CST             Results for orders placed or performed during the hospital encounter of 10/24/20   Gold Top - SST   Result Value Ref Range    Extra Tube Hold for add-ons.    Gold Top - SST   Result Value Ref Range    Extra Tube Hold for add-ons.    Green Top (Gel)   Result Value Ref Range    Extra Tube Hold for add-ons.    Blood Gas, Arterial With Co-Ox    Specimen: Arterial Blood   Result Value Ref Range    Site Arterial Line     Elia's Test N/A     pH, Arterial 7.403 7.350 - 7.450 pH units    pCO2, Arterial 34.3 (L) 35.0 - 45.0 mm Hg    pO2, Arterial 80.5 (L) 83.0 - 108.0 mm Hg    HCO3, Arterial 21.4 20.0 - 26.0 mmol/L    Base Excess, Arterial -2.8 (L) 0.0 - 2.0 mmol/L    O2 Saturation, Arterial 96.7 94.0 - 99.0 %    Hemoglobin, Blood Gas 11.6 (L) 13.5 - 17.5 g/dL    Hematocrit, Blood Gas 35.6 (L) 38.0 - 51.0 %    Oxyhemoglobin 93.8 (L) 94 - 99 %    Methemoglobin 0.30 0.00 - 3.00 %    Carboxyhemoglobin 2.7 0 - 5 %    A-a Gradiant 28.1 mmHg    Sodium, Arterial 137 136 - 146 mmol/L    Potassium, Arterial 3.9 3.4 - 4.5 mmol/L    Ionized Calcium 4.75 4.60 - 5.60 mg/dL    Glucose, Arterial 137 (H) 65 - 95 mmol/L    Barometric Pressure for Blood Gas 749 mmHg    Modality N/A     Ventilator Mode NA     Note      Collected by RT     pH, Temp Corrected      pCO2, Temperature Corrected      pO2, Temperature Corrected     Blood Gas, Arterial With Co-Ox    Specimen: Arterial Blood   Result Value Ref Range    Site Arterial Line     Elia's Test N/A     pH, Arterial 7.379 7.350 - 7.450 pH units    pCO2, Arterial 28.3 (L) 35.0 - 45.0 mm Hg    pO2, Arterial 124.0 (H) 83.0 - 108.0 mm Hg    HCO3, Arterial 16.7 (L) 20.0 - 26.0 mmol/L    Base Excess, Arterial -7.2 (L)  0.0 - 2.0 mmol/L    O2 Saturation, Arterial 99.0 94.0 - 99.0 %    Hemoglobin, Blood Gas 11.1 (L) 13.5 - 17.5 g/dL    Hematocrit, Blood Gas 34.1 (L) 38.0 - 51.0 %    Oxyhemoglobin 95.6 94 - 99 %    Methemoglobin 0.50 0.00 - 3.00 %    Carboxyhemoglobin 3.0 0 - 5 %    A-a Gradiant      Sodium, Arterial 140 136 - 146 mmol/L    Potassium, Arterial 2.9 (L) 3.4 - 4.5 mmol/L    Ionized Calcium 3.71 (L) 4.60 - 5.60 mg/dL    Glucose, Arterial 120 (H) 65 - 95 mmol/L    Barometric Pressure for Blood Gas 749 mmHg    Modality N/A     Ventilator Mode NA     Note      Collected by RT     pH, Temp Corrected      pCO2, Temperature Corrected      pO2, Temperature Corrected     COVID-19, [facility] IN-HOUSE, NP SWAB IN TRANSPORT MEDIA 8-10 HR TAT - Swab, Nasopharynx    Specimen: Nasopharynx; Swab   Result Value Ref Range    COVID19 Not Detected Not Detected - Ref. Range   Urinalysis With Microscopic If Indicated (No Culture) - Urine, Catheter    Specimen: Urine, Catheter   Result Value Ref Range    Color, UA Yellow Yellow, Straw, Dark Yellow, Maureen    Appearance, UA Clear Clear    pH, UA 6.5 5.0 - 9.0    Specific Gravity, UA 1.067 (H) 1.003 - 1.030    Glucose, UA >=1000 mg/dL (3+) (A) Negative    Ketones, UA 15 mg/dL (1+) (A) Negative    Bilirubin, UA Negative Negative    Blood, UA Negative Negative    Protein, UA Negative Negative    Leuk Esterase, UA Negative Negative    Nitrite, UA Negative Negative    Urobilinogen, UA 1.0 E.U./dL 0.2 - 1.0 E.U./dL   CBC Auto Differential    Specimen: Blood   Result Value Ref Range    WBC 15.23 (H) 3.40 - 10.80 10*3/mm3    RBC 4.35 3.77 - 5.28 10*6/mm3    Hemoglobin 12.7 12.0 - 15.9 g/dL    Hematocrit 38.6 34.0 - 46.6 %    MCV 88.7 79.0 - 97.0 fL    MCH 29.2 26.6 - 33.0 pg    MCHC 32.9 31.5 - 35.7 g/dL    RDW 14.5 12.3 - 15.4 %    RDW-SD 47.1 37.0 - 54.0 fl    MPV 8.8 6.0 - 12.0 fL    Platelets 316 140 - 450 10*3/mm3    Neutrophil % 72.7 42.7 - 76.0 %    Lymphocyte % 19.0 (L) 19.6 - 45.3 %    Monocyte %  6.3 5.0 - 12.0 %    Eosinophil % 1.1 0.3 - 6.2 %    Basophil % 0.4 0.0 - 1.5 %    Immature Grans % 0.5 0.0 - 0.5 %    Neutrophils, Absolute 11.08 (H) 1.70 - 7.00 10*3/mm3    Lymphocytes, Absolute 2.89 0.70 - 3.10 10*3/mm3    Monocytes, Absolute 0.96 (H) 0.10 - 0.90 10*3/mm3    Eosinophils, Absolute 0.17 0.00 - 0.40 10*3/mm3    Basophils, Absolute 0.06 0.00 - 0.20 10*3/mm3    Immature Grans, Absolute 0.07 (H) 0.00 - 0.05 10*3/mm3    nRBC 0.0 0.0 - 0.2 /100 WBC   CBC Auto Differential    Specimen: Blood   Result Value Ref Range    WBC 26.42 (H) 3.40 - 10.80 10*3/mm3    RBC 4.39 3.77 - 5.28 10*6/mm3    Hemoglobin 13.0 12.0 - 15.9 g/dL    Hematocrit 38.3 34.0 - 46.6 %    MCV 87.2 79.0 - 97.0 fL    MCH 29.6 26.6 - 33.0 pg    MCHC 33.9 31.5 - 35.7 g/dL    RDW 14.3 12.3 - 15.4 %    RDW-SD 45.5 37.0 - 54.0 fl    MPV 8.8 6.0 - 12.0 fL    Platelets 355 140 - 450 10*3/mm3    Neutrophil % 82.9 (H) 42.7 - 76.0 %    Lymphocyte % 10.0 (L) 19.6 - 45.3 %    Monocyte % 6.2 5.0 - 12.0 %    Eosinophil % 0.0 (L) 0.3 - 6.2 %    Basophil % 0.2 0.0 - 1.5 %    Immature Grans % 0.7 (H) 0.0 - 0.5 %    Neutrophils, Absolute 21.90 (H) 1.70 - 7.00 10*3/mm3    Lymphocytes, Absolute 2.64 0.70 - 3.10 10*3/mm3    Monocytes, Absolute 1.64 (H) 0.10 - 0.90 10*3/mm3    Eosinophils, Absolute 0.01 0.00 - 0.40 10*3/mm3    Basophils, Absolute 0.04 0.00 - 0.20 10*3/mm3    Immature Grans, Absolute 0.19 (H) 0.00 - 0.05 10*3/mm3    nRBC 0.0 0.0 - 0.2 /100 WBC     *Note: Due to a large number of results and/or encounters for the requested time period, some results have not been displayed. A complete set of results can be found in Results Review.

## 2020-12-09 NOTE — PROGRESS NOTES
CHIEF COMPLAINT:   Chief Complaint   Patient presents with   • Follow-up     Laparotomy and Closure of Gastric Ulcer        HPI: This patient is seen post-operatively following exploratory laparotomy with judi patch repair for perforated ulcer.     Patient  is doing well. Eating well without any significant nausea. Having good bowel function. No problems with constipation or diarrhea. No urinary complaints. Denies fever. Ambulating well and slowly returning to normal activities. She had an appointment with gastroenterology.        PATHOLOGY:            PHYSICAL EXAM:    ABD: Incisions are healing well without any erythema or signs of infection. Abdomen is soft and non distended.  Gtube was removed last visit and site is healing well.      ASSESSMENT    Diagnoses and all orders for this visit:    1. S/P exploratory laparotomy (Primary)        PLAN:    1. The patient will follow-up in 3 months unless any concerns arise sooner.    2.  Continue follow up with GI.    3. May now continue normal activity without restrictions.                This document has been electronically signed by LEILANI Bartholomew on December 9, 2020 14:47 CST

## 2021-02-15 ENCOUNTER — TRANSCRIBE ORDERS (OUTPATIENT)
Dept: GENERAL RADIOLOGY | Facility: CLINIC | Age: 46
End: 2021-02-15

## 2021-02-15 DIAGNOSIS — E78.01 HYPERLIPIDEMIA TYPE II: ICD-10-CM

## 2021-02-15 DIAGNOSIS — E55.9 VITAMIN D DEFICIENCY, UNSPECIFIED: ICD-10-CM

## 2021-02-15 DIAGNOSIS — Z79.4 TYPE 2 DIABETES MELLITUS WITH OTHER SPECIFIED COMPLICATION, WITH LONG-TERM CURRENT USE OF INSULIN (HCC): Primary | ICD-10-CM

## 2021-02-15 DIAGNOSIS — I10 ESSENTIAL (PRIMARY) HYPERTENSION: ICD-10-CM

## 2021-02-15 DIAGNOSIS — E11.69 TYPE 2 DIABETES MELLITUS WITH OTHER SPECIFIED COMPLICATION, WITH LONG-TERM CURRENT USE OF INSULIN (HCC): Primary | ICD-10-CM

## 2021-02-24 ENCOUNTER — TELEMEDICINE (OUTPATIENT)
Dept: ENDOCRINOLOGY | Facility: CLINIC | Age: 46
End: 2021-02-24

## 2021-02-24 DIAGNOSIS — E11.65 TYPE 2 DIABETES MELLITUS WITH HYPERGLYCEMIA, WITH LONG-TERM CURRENT USE OF INSULIN (HCC): ICD-10-CM

## 2021-02-24 DIAGNOSIS — Z79.4 TYPE 2 DIABETES MELLITUS WITH HYPERGLYCEMIA, WITH LONG-TERM CURRENT USE OF INSULIN (HCC): ICD-10-CM

## 2021-02-24 DIAGNOSIS — R21 RASH: Primary | ICD-10-CM

## 2021-02-24 PROCEDURE — 99213 OFFICE O/P EST LOW 20 MIN: CPT | Performed by: NURSE PRACTITIONER

## 2021-02-24 RX ORDER — SEMAGLUTIDE 1.34 MG/ML
0.5 INJECTION, SOLUTION SUBCUTANEOUS WEEKLY
Qty: 3 PEN | Refills: 11 | Status: SHIPPED | OUTPATIENT
Start: 2021-02-24 | End: 2022-03-01

## 2021-02-24 RX ORDER — CANAGLIFLOZIN 100 MG/1
1 TABLET, FILM COATED ORAL DAILY
Qty: 30 TABLET | Refills: 11 | Status: SHIPPED | OUTPATIENT
Start: 2021-02-24 | End: 2021-03-31

## 2021-02-24 NOTE — PROGRESS NOTES
Chief Complaint  Diabetes    Subjective          Jeanie Anjelica Jones presents to [facility] ENDOCRINOLOGY  History of Present Illness     You have chosen to receive care through a telehealth visit.  Do you consent to use a video/audio connection for your medical care today? Yes              TELEHEALTH VIDEO VISIT     This a video visit due to [facility] current guidelines for social distancing due to the COVID 19 pandemic        Primary Care Provider     Parth Salas APRN    45 year old female comes follow up       Reason diabetes induced by steroid use          Duration-- 2 years      Quality improving control      Severity moderate      Timing constant      Quantity      Lab Results   Component Value Date    HGBA1C 9.8 (H) 07/28/2020          Symptoms -- none      Alleviating factors -- compliance      Aggravating factors steroid use      Blood glucose readings      She checks 4 times daily and has been for over 90 days       Tracee personal use --states bg are at goal         ---------------------------------------------------------------------------        RASH      Has been treated with prednisone for about 5 years      Rash is on the arms and legs     It is widespread, she does have itching      alleviating factors -- steroids and atarax      Modifying factors include the use of steroids and Atarax         has not seen dermatology yet          Review of Systems   Constitutional: Positive for fatigue.     Objective   Vital Signs:   There were no vitals taken for this visit.    Physical Exam  Constitutional:       Appearance: Normal appearance.   HENT:      Head: Normocephalic.      Nose: Nose normal.   Eyes:      Conjunctiva/sclera: Conjunctivae normal.      Pupils: Pupils are equal, round, and reactive to light.   Neck:      Musculoskeletal: Normal range of motion.   Cardiovascular:      Rate and Rhythm: Regular rhythm.      Heart sounds: Normal heart sounds.   Pulmonary:      Effort: Pulmonary  effort is normal.      Breath sounds: Normal breath sounds.   Musculoskeletal: Normal range of motion.   Skin:     Coloration: Skin is not pale.   Neurological:      General: No focal deficit present.      Mental Status: She is alert.   Psychiatric:         Mood and Affect: Mood normal.         Thought Content: Thought content normal.         Judgment: Judgment normal.        Result Review :   The following data was reviewed by: LEILANI Aguilar on 02/24/2021:  Common labs    Common Labsle 10/25/20 10/25/20 10/26/20 10/26/20 10/27/20    [id number] [id number]    Glucose  174 (A)  62 (A)    BUN  10  12    Creatinine  0.52 (A)  0.64    eGFR Non  Am  129  101    eGFR  Am  >150  123    Sodium  136  139    Potassium  3.9  3.0 (A) 3.4 (A)   Chloride  102  99    Calcium  8.2 (A)  8.6    WBC 26.42 (A)  15.23 (A)     Hemoglobin 13.0  12.7     Hematocrit 38.3  38.6     Platelets 355  316     (A) Abnormal value                      Assessment and Plan    Diagnoses and all orders for this visit:    1. Rash (Primary)    2. Type 2 diabetes mellitus with hyperglycemia, with long-term current use of insulin (CMS/[facility])  -     Canagliflozin (Invokana) 100 MG tablet tablet; Take 1 tablet by mouth Daily.  Dispense: 30 tablet; Refill: 11    Other orders  -     Semaglutide,0.25 or 0.5MG/DOS, (Ozempic, 0.25 or 0.5 MG/DOSE,) 2 MG/1.5ML solution pen-injector; Inject 0.5 mg under the skin into the appropriate area as directed 1 (One) Time Per Week.  Dispense: 3 pen; Refill: 11           Rash --- REFERRED  TO  DERMATOLOGY AND IMMUNOLOGY --has an appt next month           Using atarax 50 mg tid --continue       Rash does not appear to be endocrine related      Component      Latest Ref Rng & Units 8/18/2020 8/20/2020   Creatinine, 24H       0.70 - 1.60 g/24 hr   0.66 (L)   Creatinine, Urine      mg/dL   36.9   Urine Volume      mL   1,800   Time (Hours)      hrs   24   5-HIAA, Urine      Undefined mg/L   3.0   5-HIAA, 24H  Ur      0.0 - 14.9 mg/24 hr   5.4   Calcitonin      0.0 - 5.0 pg/mL <2.0     Insulin-Like Growth Factor-1      74 - 239 ng/mL 94     MANUEL Direct      Negative Negative     Uric Acid      2.4 - 5.7 mg/dL 2.6     C-Reactive Protein      0.00 - 0.50 mg/dL 0.36     Sed Rate      0 - 20 mm/hr 2     C-Peptide      1.1 - 4.4 ng/mL 5.4 (H)                       -----------        Cushing's medication induced / adrenal insufficiency            Prednisone            Now on 5 mg tablets and has tapered down to       taking one am and one pm      If fever , nausea or vomiting , take 100 mg IM and head to ER                    ------------     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           Taking invokana 100 mg daily         Lantus taking take 10 units      If am sugars are less than 80 decrease by 5 units            Humalog      Taking 4 units before each meals      Plus         151-200: 2units  201-250: 4 units  251-300 : 6units  Above 300 : 8 units           The goal is to have a sugar 2 hours after eating less than 180            Taking Ozempic 0.5 mg once weekly         No changes above -- bg is at goal      She now has a balta personal system     She uses the [employer] data to adjust her insulin dosages                        Follow Up   No follow-ups on file.  Patient was given instructions and counseling regarding her condition or for health maintenance advice. Please see specific information pulled into the AVS if appropriate.     We spent 15 minutes including reviewing the patients electronic chart, reviewing medications, reviewing labs, active problems    I provided advice regarding diabetes management, dietary changes, adjustments of medication, self titration of insulin, refilled medications, ordering labs, future appointments    Patient was advised to contact the office if there are unanswered questions, trouble with blood glucose management, or any concerns

## 2021-03-09 ENCOUNTER — OFFICE VISIT (OUTPATIENT)
Dept: GASTROENTEROLOGY | Facility: CLINIC | Age: 46
End: 2021-03-09

## 2021-03-09 ENCOUNTER — OFFICE VISIT (OUTPATIENT)
Dept: SURGERY | Facility: CLINIC | Age: 46
End: 2021-03-09

## 2021-03-09 VITALS
SYSTOLIC BLOOD PRESSURE: 126 MMHG | HEIGHT: 63 IN | HEART RATE: 105 BPM | WEIGHT: 152 LBS | TEMPERATURE: 97.6 F | DIASTOLIC BLOOD PRESSURE: 80 MMHG | BODY MASS INDEX: 26.93 KG/M2

## 2021-03-09 VITALS
HEIGHT: 63 IN | HEART RATE: 111 BPM | SYSTOLIC BLOOD PRESSURE: 124 MMHG | BODY MASS INDEX: 27.07 KG/M2 | WEIGHT: 152.8 LBS | DIASTOLIC BLOOD PRESSURE: 84 MMHG

## 2021-03-09 DIAGNOSIS — K92.1 MELENA: ICD-10-CM

## 2021-03-09 DIAGNOSIS — R10.84 GENERALIZED ABDOMINAL PAIN: ICD-10-CM

## 2021-03-09 DIAGNOSIS — K25.5 GASTRIC ULCER WITH PERFORATION, UNSPECIFIED CHRONICITY (HCC): Primary | ICD-10-CM

## 2021-03-09 DIAGNOSIS — K27.9 PEPTIC ULCER DISEASE: Primary | ICD-10-CM

## 2021-03-09 PROCEDURE — 99212 OFFICE O/P EST SF 10 MIN: CPT | Performed by: NURSE PRACTITIONER

## 2021-03-09 PROCEDURE — 99214 OFFICE O/P EST MOD 30 MIN: CPT | Performed by: NURSE PRACTITIONER

## 2021-03-09 RX ORDER — DEXTROSE AND SODIUM CHLORIDE 5; .45 G/100ML; G/100ML
30 INJECTION, SOLUTION INTRAVENOUS CONTINUOUS PRN
Status: CANCELLED | OUTPATIENT
Start: 2021-04-06

## 2021-03-09 RX ORDER — BUPRENORPHINE 300 MG/1
SOLUTION SUBCUTANEOUS
COMMUNITY

## 2021-03-09 RX ORDER — ONDANSETRON 4 MG/1
4 TABLET, ORALLY DISINTEGRATING ORAL EVERY 8 HOURS PRN
Qty: 20 TABLET | Refills: 2 | Status: SHIPPED | OUTPATIENT
Start: 2021-03-09 | End: 2021-03-22

## 2021-03-09 NOTE — PATIENT INSTRUCTIONS
"BMI for Adults  What is BMI?  Body mass index (BMI) is a number that is calculated from a person's weight and height. BMI can help estimate how much of a person's weight is composed of fat. BMI does not measure body fat directly. Rather, it is an alternative to procedures that directly measure body fat, which can be difficult and expensive.  BMI can help identify people who may be at higher risk for certain medical problems.  What are BMI measurements used for?  BMI is used as a screening tool to identify possible weight problems. It helps determine whether a person is obese, overweight, a healthy weight, or underweight.  BMI is useful for:  · Identifying a weight problem that may be related to a medical condition or may increase the risk for medical problems.  · Promoting changes, such as changes in diet and exercise, to help reach a healthy weight. BMI screening can be repeated to see if these changes are working.  How is BMI calculated?  BMI involves measuring your weight in relation to your height. Both height and weight are measured, and the BMI is calculated from those numbers. This can be done either in English ([place] or metric measurements. Note that charts and online BMI calculators are available to help you find your BMI quickly and easily without having to do these calculations yourself.  To calculate your BMI in English ([place] measurements:    1. Measure your weight in pounds (lb).  2. Multiply the number of pounds by 703.  ? For example, for a person who weighs 180 lb, multiply that number by 703, which equals 126,540.  3. Measure your height in inches. Then multiply that number by itself to get a measurement called \"inches squared.\"  ? For example, for a person who is 70 inches tall, the \"inches squared\" measurement is 70 inches x 70 inches, which equals 4,900 inches squared.  4. Divide the total from step 2 (number of lb x 703) by the total from step 3 (inches squared): 126,540 ÷ 4,900 = 25.8. This is " "your BMI.  To calculate your BMI in metric measurements:  1. Measure your weight in kilograms (kg).  2. Measure your height in meters (m). Then multiply that number by itself to get a measurement called \"meters squared.\"  ? For example, for a person who is 1.75 m tall, the \"meters squared\" measurement is 1.75 m x 1.75 m, which is equal to 3.1 meters squared.  3. Divide the number of kilograms (your weight) by the meters squared number. In this example: 70 ÷ 3.1 = 22.6. This is your BMI.  What do the results mean?  BMI charts are used to identify whether you are underweight, normal weight, overweight, or obese. The following guidelines will be used:  · Underweight: BMI less than 18.5.  · Normal weight: BMI between 18.5 and 24.9.  · Overweight: BMI between 25 and 29.9.  · Obese: BMI of 30 or above.  Keep these notes in mind:  · Weight includes both fat and muscle, so someone with a muscular build, such as an athlete, may have a BMI that is higher than 24.9. In cases like these, BMI is not an accurate measure of body fat.  · To determine if excess body fat is the cause of a BMI of 25 or higher, further assessments may need to be done by a health care provider.  · BMI is usually interpreted in the same way for men and women.  Where to find more information  For more information about BMI, including tools to quickly calculate your BMI, go to these websites:  · Centers for Disease Control and Prevention: www.cdc.gov  · American Heart Association: www.heart.org  · National Heart, Lung, and Blood [place]: www.nhlbi.nih.gov  Summary  · Body mass index (BMI) is a number that is calculated from a person's weight and height.  · BMI may help estimate how much of a person's weight is composed of fat. BMI can help identify those who may be at higher risk for certain medical problems.  · BMI can be measured using English measurements or metric measurements.  · BMI charts are used to identify whether you are underweight, normal " weight, overweight, or obese.  This information is not intended to replace advice given to you by your health care provider. Make sure you discuss any questions you have with your health care provider.  Document Revised: 09/09/2020 Document Reviewed: 07/17/2020  Elsevier Patient Education © 2020 [employer]

## 2021-03-09 NOTE — PATIENT INSTRUCTIONS
"BMI for Adults  What is BMI?  Body mass index (BMI) is a number that is calculated from a person's weight and height. BMI can help estimate how much of a person's weight is composed of fat. BMI does not measure body fat directly. Rather, it is an alternative to procedures that directly measure body fat, which can be difficult and expensive.  BMI can help identify people who may be at higher risk for certain medical problems.  What are BMI measurements used for?  BMI is used as a screening tool to identify possible weight problems. It helps determine whether a person is obese, overweight, a healthy weight, or underweight.  BMI is useful for:  · Identifying a weight problem that may be related to a medical condition or may increase the risk for medical problems.  · Promoting changes, such as changes in diet and exercise, to help reach a healthy weight. BMI screening can be repeated to see if these changes are working.  How is BMI calculated?  BMI involves measuring your weight in relation to your height. Both height and weight are measured, and the BMI is calculated from those numbers. This can be done either in English ([place] or metric measurements. Note that charts and online BMI calculators are available to help you find your BMI quickly and easily without having to do these calculations yourself.  To calculate your BMI in English ([place] measurements:    1. Measure your weight in pounds (lb).  2. Multiply the number of pounds by 703.  ? For example, for a person who weighs 180 lb, multiply that number by 703, which equals 126,540.  3. Measure your height in inches. Then multiply that number by itself to get a measurement called \"inches squared.\"  ? For example, for a person who is 70 inches tall, the \"inches squared\" measurement is 70 inches x 70 inches, which equals 4,900 inches squared.  4. Divide the total from step 2 (number of lb x 703) by the total from step 3 (inches squared): 126,540 ÷ 4,900 = 25.8. This is " "your BMI.  To calculate your BMI in metric measurements:  1. Measure your weight in kilograms (kg).  2. Measure your height in meters (m). Then multiply that number by itself to get a measurement called \"meters squared.\"  ? For example, for a person who is 1.75 m tall, the \"meters squared\" measurement is 1.75 m x 1.75 m, which is equal to 3.1 meters squared.  3. Divide the number of kilograms (your weight) by the meters squared number. In this example: 70 ÷ 3.1 = 22.6. This is your BMI.  What do the results mean?  BMI charts are used to identify whether you are underweight, normal weight, overweight, or obese. The following guidelines will be used:  · Underweight: BMI less than 18.5.  · Normal weight: BMI between 18.5 and 24.9.  · Overweight: BMI between 25 and 29.9.  · Obese: BMI of 30 or above.  Keep these notes in mind:  · Weight includes both fat and muscle, so someone with a muscular build, such as an athlete, may have a BMI that is higher than 24.9. In cases like these, BMI is not an accurate measure of body fat.  · To determine if excess body fat is the cause of a BMI of 25 or higher, further assessments may need to be done by a health care provider.  · BMI is usually interpreted in the same way for men and women.  Where to find more information  For more information about BMI, including tools to quickly calculate your BMI, go to these websites:  · Centers for Disease Control and Prevention: www.cdc.gov  · American Heart Association: www.heart.org  · National Heart, Lung, and Blood [place]: www.nhlbi.nih.gov  Summary  · Body mass index (BMI) is a number that is calculated from a person's weight and height.  · BMI may help estimate how much of a person's weight is composed of fat. BMI can help identify those who may be at higher risk for certain medical problems.  · BMI can be measured using English measurements or metric measurements.  · BMI charts are used to identify whether you are underweight, normal " weight, overweight, or obese.  This information is not intended to replace advice given to you by your health care provider. Make sure you discuss any questions you have with your health care provider.  Document Revised: 09/09/2020 Document Reviewed: 07/17/2020  Elsevier Patient Education © 2020 [employer]

## 2021-03-09 NOTE — PROGRESS NOTES
Chief Complaint   Patient presents with   • Peptic Ulcer Disease       Subjective    Jeanie Jones is a 45 y.o. female. she is here today for follow-up.    History of Present Illness  45-year-old female presents for recheck due to history of perforated peptic ulcer.  States she still having some abdominal pain and melena.  Denies any recent nausea or vomiting.  PPI has improved reflux but states she is always afraid ulcer is going to recur.  Reports intermittent abdominal pain goes across her entire abdomen but states usually always starts in the left upper quadrant.  Plan; schedule patient for EGD and colonoscopy due to history of perforated peptic ulcer and melena.       The following portions of the patient's history were reviewed and updated as appropriate:   Past Medical History:   Diagnosis Date   • Anxiety    • Depression    • Diabetes mellitus (CMS/HCC)    • GERD (gastroesophageal reflux disease)     OCCASIONALLY   • Kidney stones    • Low back pain    • MRSA infection    • Multiple joint pain    • Other long term (current) drug therapy    • Pancreatitis      Past Surgical History:   Procedure Laterality Date   • ADENOIDECTOMY     • APPENDECTOMY     •  SECTION     • CHOLECYSTECTOMY     • CYSTOSCOPY, URETEROSCOPY, RETROGRADE PYELOGRAM, STENT INSERTION Right 2020    Procedure: CYSTOSCOPY, RIGHT RETROGRAE, URETEROSCOPY, LASER LITHOTRIPSY, STENT PLACEMENT  LATEX ALLERGY;  Surgeon: Bakari Starr MD;  Location: [facility] OR;  Service: Urology;  Laterality: Right;   • EXPLORATORY LAPAROTOMY N/A 10/24/2020    Procedure: LAPAROTOMY EXPLORATORY AND CLOSURE OF GASTRIC ULCER,;  Surgeon: Valeriano Goldberg MD;  Location: [facility];  Service: General;  Laterality: N/A;   • FRACTURE SURGERY Left     LLE with plates and screws   • HYSTERECTOMY     • INJECTION OF MEDICATION  2015    Toradol (1)      • TONSILLECTOMY       Family History   Problem Relation Age of Onset   • Rheum arthritis Mother    •  Cancer Mother         lung cancer   • Heart disease Mother      OB History        1    Para   0    Term   0       0    AB   0    Living   1       SAB   0    TAB   0    Ectopic   0    Molar        Multiple   0    Live Births                  Prior to Admission medications    Medication Sig Start Date End Date Taking? Authorizing Provider   Buprenorphine ER (Sublocade) 300 MG/1.5ML solution prefilled syringe Inject  under the skin into the appropriate area as directed.   Yes Renae Perla MD   Canagliflozin (Invokana) 100 MG tablet tablet Take 1 tablet by mouth Daily. 21  Yes Dano Wong APRN   Cholecalciferol 1.25 MG (95939 UT) tablet 50 thousand units po q 2 weeks 20  Yes Raza Verduzco MD   coenzyme Q10 50 MG capsule capsule Take 50 mg by mouth Daily.   Yes Renae Perla MD   Continuous Blood Gluc  (FreeStyle Tracee 2 Dille Systm) device 1 each Continuous. Use as indicated for glucose monitoring 20  Yes Dano Wong APRN   Continuous Blood Gluc Sensor (FreeStyle Tracee 2 Sensor Systm) misc 1 each Every 14 (Fourteen) Days. Use every 14 days 20  Yes Dano Wong APRN   dilTIAZem (CARDIZEM) 30 MG tablet Take 1 tablet by mouth 3 (Three) Times a Day. 10/28/20  Yes Valeriano Goldberg MD   DULoxetine (CYMBALTA) 60 MG capsule Take 60 mg by mouth Daily.   Yes Renae Perla MD   escitalopram (LEXAPRO) 10 MG tablet Take 10 mg by mouth Daily.   Yes Renae Perla MD   famotidine (PEPCID) 40 MG tablet Take 40 mg by mouth Daily.   Yes Renae Perla MD   glucagon (Glucagon Emergency) 1 MG injection Inject 1 mg under the skin into the appropriate area as directed 1 (One) Time As Needed for Low Blood Sugar for up to 1 dose. 20  Yes Raza Verduzco MD   hydrOXYzine (ATARAX) 50 MG tablet Take 50 mg by mouth As Needed.   Yes Renae Perla MD   Insulin Glargine (BASAGLAR KWIKPEN) 100 UNIT/ML  injection pen 50 units qhs 8/18/20  Yes Raza Verduzco MD   Insulin Lispro, 1 Unit Dial, (HumaLOG KwikPen) 100 UNIT/ML solution pen-injector Use up to 20 units 3 times a day with meals 8/20/20  Yes Raza Verduzco MD   Insulin Pen Needle (PEN NEEDLES) 32G X 4 MM misc 1 each 4 (Four) Times a Day. Use 4 x daily, Dx code E11.65 8/18/20  Yes Raza Verduzco MD   Lantus SoloStar 100 UNIT/ML injection pen  10/19/20  Yes Provider, MD Renae   lisinopril (PRINIVIL,ZESTRIL) 2.5 MG tablet Take 2.5 mg by mouth Daily.   Yes Provider, MD Renae   ondansetron (ZOFRAN) 4 MG tablet Take 4 mg by mouth Every 8 (Eight) Hours As Needed for Nausea or Vomiting.   Yes Provider, MD Renae   pantoprazole (PROTONIX) 40 MG EC tablet Daily. 11/13/20  Yes Provider, MD Renae   predniSONE (DELTASONE) 5 MG tablet Take 5 mg by mouth 2 (Two) Times a Day.   Yes Provider, MD Renae   rosuvastatin (CRESTOR) 20 MG tablet Daily. 11/9/20  Yes ProviderRenae MD   rosuvastatin (CRESTOR) 5 MG tablet Take 1 tablet by mouth Every Night.  Patient taking differently: Take 20 mg by mouth Every Night. 4/9/18  Yes Parth Salas APRN   Semaglutide,0.25 or 0.5MG/DOS, (Ozempic, 0.25 or 0.5 MG/DOSE,) 2 MG/1.5ML solution pen-injector Inject 0.5 mg under the skin into the appropriate area as directed 1 (One) Time Per Week. 2/24/21  Yes Dano Wong APRN   tiZANidine (ZANAFLEX) 4 MG tablet Take 4 mg by mouth Every 8 (Eight) Hours As Needed for Muscle Spasms.   Yes Provider, MD Renae   fluconazole (DIFLUCAN) 150 MG tablet Take 1 tablet day of infection and 1 tablet 3 days later 8/18/20   Raza Verduzco MD   buprenorphine-naloxone (SUBOXONE) 8-2 MG per SL tablet Place 3 tablets under the tongue Daily. 12/20/17 3/9/21  Provider, Historical, MD     Allergies   Allergen Reactions   • Adhesive Tape Unknown - Low Severity     Tares skin    • Bactrim [Sulfamethoxazole-Trimethoprim] Hives  "  • Ciprofloxacin Hives   • Latex Rash     Only when pt wears latex gloves      Social History     Socioeconomic History   • Marital status: Single     Spouse name: Not on file   • Number of children: 1   • Years of education: Not on file   • Highest education level: Not on file   Tobacco Use   • Smoking status: Current Every Day Smoker     Packs/day: 0.50     Years: 20.00     Pack years: 10.00     Types: Cigarettes   • Smokeless tobacco: Never Used   Substance and Sexual Activity   • Alcohol use: No   • Drug use: No   • Sexual activity: Defer       Review of Systems  Review of Systems   Constitutional: Positive for fatigue. Negative for activity change, appetite change, chills, diaphoresis, fever and unexpected weight change.   HENT: Negative for sore throat and trouble swallowing.    Respiratory: Negative for shortness of breath.    Gastrointestinal: Positive for nausea. Negative for abdominal distention, abdominal pain, anal bleeding, blood in stool, constipation, diarrhea, rectal pain and vomiting.   Musculoskeletal: Negative for arthralgias.   Skin: Negative for pallor.   Neurological: Negative for light-headedness.        /84 (BP Location: Left arm)   Pulse 111   Ht 160 cm (63\")   Wt 69.3 kg (152 lb 12.8 oz)   BMI 27.07 kg/m²     Objective    Physical Exam  Constitutional:       General: She is not in acute distress.     Appearance: Normal appearance. She is well-developed.   Neck:      Thyroid: No thyroid mass or thyromegaly.   Cardiovascular:      Rate and Rhythm: Normal rate and regular rhythm.      Heart sounds: Normal heart sounds.   Pulmonary:      Effort: Pulmonary effort is normal. No respiratory distress.      Breath sounds: Normal breath sounds.   Abdominal:      General: Bowel sounds are normal. There is no distension.      Palpations: Abdomen is soft.      Tenderness: There is abdominal tenderness in the left upper quadrant.      Hernia: No hernia is present.       No results displayed " because visit has over 200 results.        Assessment/Plan      1. Peptic ulcer disease    2. Melena    3. Generalized abdominal pain    .       Orders placed during this encounter include:  Orders Placed This Encounter   Procedures   • Follow Anesthesia Guidelines / Standing Orders     Standing Status:   Future   • Obtain Informed Consent     Standing Status:   Future     Order Specific Question:   Informed Consent Given For     Answer:   ESOPHAGOGASTRODUODENOSCOPY possible dilation   • Follow Anesthesia Guidelines / Standing Orders     Standing Status:   Future   • Obtain Informed Consent     Standing Status:   Future     Order Specific Question:   Informed Consent Given For     Answer:   COLONOSCOPY       COLONOSCOPY (N/A)    Review and/or summary of lab tests, radiology, procedures, medications. Review and summary of old records and obtaining of history. The risks and benefits of my recommendations, as well as other treatment options were discussed with the patient today. Questions were answered.    New Medications Ordered This Visit   Medications   • ondansetron ODT (Zofran ODT) 4 MG disintegrating tablet     Sig: Place 1 tablet on the tongue Every 8 (Eight) Hours As Needed for Nausea or Vomiting.     Dispense:  20 tablet     Refill:  2   • polyethylene glycol (Golytely) 236 g solution     Sig: Take 4,000 mL by mouth 1 (One) Time for 1 dose. Take as per instruction sheet for colonoscopy prep.     Dispense:  4000 mL     Refill:  0       Follow-up: Return in about 4 weeks (around 4/6/2021) for Recheck, After test.          This document has been electronically signed by LEILANI Rouse on March 11, 2021 17:25 CST           I spent 32 minutes caring for Jeanie on this date of service. This time includes time spent by me in the following activities:preparing for the visit, reviewing tests, obtaining and/or reviewing a separately obtained history, performing a medically appropriate examination and/or evaluation ,  counseling and educating the patient/family/caregiver, ordering medications, tests, or procedures, referring and communicating with other health care professionals , documenting information in the medical record and care coordination    Results for orders placed or performed during the hospital encounter of 10/24/20   Gold Top - SST   Result Value Ref Range    Extra Tube Hold for add-ons.    Gold Top - SST   Result Value Ref Range    Extra Tube Hold for add-ons.    Green Top (Gel)   Result Value Ref Range    Extra Tube Hold for add-ons.    Blood Gas, Arterial With Co-Ox    Specimen: Arterial Blood   Result Value Ref Range    Site Arterial Line     Elia's Test N/A     pH, Arterial 7.403 7.350 - 7.450 pH units    pCO2, Arterial 34.3 (L) 35.0 - 45.0 mm Hg    pO2, Arterial 80.5 (L) 83.0 - 108.0 mm Hg    HCO3, Arterial 21.4 20.0 - 26.0 mmol/L    Base Excess, Arterial -2.8 (L) 0.0 - 2.0 mmol/L    O2 Saturation, Arterial 96.7 94.0 - 99.0 %    Hemoglobin, Blood Gas 11.6 (L) 13.5 - 17.5 g/dL    Hematocrit, Blood Gas 35.6 (L) 38.0 - 51.0 %    Oxyhemoglobin 93.8 (L) 94 - 99 %    Methemoglobin 0.30 0.00 - 3.00 %    Carboxyhemoglobin 2.7 0 - 5 %    A-a Gradiant 28.1 mmHg    Sodium, Arterial 137 136 - 146 mmol/L    Potassium, Arterial 3.9 3.4 - 4.5 mmol/L    Ionized Calcium 4.75 4.60 - 5.60 mg/dL    Glucose, Arterial 137 (H) 65 - 95 mmol/L    Barometric Pressure for Blood Gas 749 mmHg    Modality N/A     Ventilator Mode NA     Note      Collected by RT     pH, Temp Corrected      pCO2, Temperature Corrected      pO2, Temperature Corrected     Blood Gas, Arterial With Co-Ox    Specimen: Arterial Blood   Result Value Ref Range    Site Arterial Line     Elia's Test N/A     pH, Arterial 7.379 7.350 - 7.450 pH units    pCO2, Arterial 28.3 (L) 35.0 - 45.0 mm Hg    pO2, Arterial 124.0 (H) 83.0 - 108.0 mm Hg    HCO3, Arterial 16.7 (L) 20.0 - 26.0 mmol/L    Base Excess, Arterial -7.2 (L) 0.0 - 2.0 mmol/L    O2 Saturation, Arterial 99.0  94.0 - 99.0 %    Hemoglobin, Blood Gas 11.1 (L) 13.5 - 17.5 g/dL    Hematocrit, Blood Gas 34.1 (L) 38.0 - 51.0 %    Oxyhemoglobin 95.6 94 - 99 %    Methemoglobin 0.50 0.00 - 3.00 %    Carboxyhemoglobin 3.0 0 - 5 %    A-a Gradiant      Sodium, Arterial 140 136 - 146 mmol/L    Potassium, Arterial 2.9 (L) 3.4 - 4.5 mmol/L    Ionized Calcium 3.71 (L) 4.60 - 5.60 mg/dL    Glucose, Arterial 120 (H) 65 - 95 mmol/L    Barometric Pressure for Blood Gas 749 mmHg    Modality N/A     Ventilator Mode NA     Note      Collected by RT     pH, Temp Corrected      pCO2, Temperature Corrected      pO2, Temperature Corrected     COVID-19, [facility] IN-HOUSE, NP SWAB IN TRANSPORT MEDIA 8-10 HR TAT - Swab, Nasopharynx    Specimen: Nasopharynx; Swab   Result Value Ref Range    COVID19 Not Detected Not Detected - Ref. Range   Urinalysis With Microscopic If Indicated (No Culture) - Urine, Catheter    Specimen: Urine, Catheter   Result Value Ref Range    Color, UA Yellow Yellow, Straw, Dark Yellow, Maureen    Appearance, UA Clear Clear    pH, UA 6.5 5.0 - 9.0    Specific Gravity, UA 1.067 (H) 1.003 - 1.030    Glucose, UA >=1000 mg/dL (3+) (A) Negative    Ketones, UA 15 mg/dL (1+) (A) Negative    Bilirubin, UA Negative Negative    Blood, UA Negative Negative    Protein, UA Negative Negative    Leuk Esterase, UA Negative Negative    Nitrite, UA Negative Negative    Urobilinogen, UA 1.0 E.U./dL 0.2 - 1.0 E.U./dL   CBC Auto Differential    Specimen: Blood   Result Value Ref Range    WBC 15.23 (H) 3.40 - 10.80 10*3/mm3    RBC 4.35 3.77 - 5.28 10*6/mm3    Hemoglobin 12.7 12.0 - 15.9 g/dL    Hematocrit 38.6 34.0 - 46.6 %    MCV 88.7 79.0 - 97.0 fL    MCH 29.2 26.6 - 33.0 pg    MCHC 32.9 31.5 - 35.7 g/dL    RDW 14.5 12.3 - 15.4 %    RDW-SD 47.1 37.0 - 54.0 fl    MPV 8.8 6.0 - 12.0 fL    Platelets 316 140 - 450 10*3/mm3    Neutrophil % 72.7 42.7 - 76.0 %    Lymphocyte % 19.0 (L) 19.6 - 45.3 %    Monocyte % 6.3 5.0 - 12.0 %    Eosinophil % 1.1 0.3 - 6.2 %     Basophil % 0.4 0.0 - 1.5 %    Immature Grans % 0.5 0.0 - 0.5 %    Neutrophils, Absolute 11.08 (H) 1.70 - 7.00 10*3/mm3    Lymphocytes, Absolute 2.89 0.70 - 3.10 10*3/mm3    Monocytes, Absolute 0.96 (H) 0.10 - 0.90 10*3/mm3    Eosinophils, Absolute 0.17 0.00 - 0.40 10*3/mm3    Basophils, Absolute 0.06 0.00 - 0.20 10*3/mm3    Immature Grans, Absolute 0.07 (H) 0.00 - 0.05 10*3/mm3    nRBC 0.0 0.0 - 0.2 /100 WBC   CBC Auto Differential    Specimen: Blood   Result Value Ref Range    WBC 26.42 (H) 3.40 - 10.80 10*3/mm3    RBC 4.39 3.77 - 5.28 10*6/mm3    Hemoglobin 13.0 12.0 - 15.9 g/dL    Hematocrit 38.3 34.0 - 46.6 %    MCV 87.2 79.0 - 97.0 fL    MCH 29.6 26.6 - 33.0 pg    MCHC 33.9 31.5 - 35.7 g/dL    RDW 14.3 12.3 - 15.4 %    RDW-SD 45.5 37.0 - 54.0 fl    MPV 8.8 6.0 - 12.0 fL    Platelets 355 140 - 450 10*3/mm3    Neutrophil % 82.9 (H) 42.7 - 76.0 %    Lymphocyte % 10.0 (L) 19.6 - 45.3 %    Monocyte % 6.2 5.0 - 12.0 %    Eosinophil % 0.0 (L) 0.3 - 6.2 %    Basophil % 0.2 0.0 - 1.5 %    Immature Grans % 0.7 (H) 0.0 - 0.5 %    Neutrophils, Absolute 21.90 (H) 1.70 - 7.00 10*3/mm3    Lymphocytes, Absolute 2.64 0.70 - 3.10 10*3/mm3    Monocytes, Absolute 1.64 (H) 0.10 - 0.90 10*3/mm3    Eosinophils, Absolute 0.01 0.00 - 0.40 10*3/mm3    Basophils, Absolute 0.04 0.00 - 0.20 10*3/mm3    Immature Grans, Absolute 0.19 (H) 0.00 - 0.05 10*3/mm3    nRBC 0.0 0.0 - 0.2 /100 WBC     *Note: Due to a large number of results and/or encounters for the requested time period, some results have not been displayed. A complete set of results can be found in Results Review.

## 2021-03-09 NOTE — PROGRESS NOTES
"Chief Complaint  Follow-up (Laparotomy and Closure of Gastric Ulcer)       Subjective          Jeanie Jones presents to [facility]  History of Present Illness     This patient is seen post-operatively following exploratory laparotomy with judi patch repair and Gtube placement for perforated ulcer in October 2020 per Dr. Goldberg.      Patient is doing well. Eating well without any significant nausea. She is still having frequent occasional right shoulder gas pain. She is still taking H2 blocker and PPI.  She states the last couple weeks she has noted some dark black stool. She denies vomiting.  No problems with constipation or diarrhea. No urinary complaints. Denies fever. She is routinely being followed by Lizett Vides with gastroenterology and is scheduled to have EGD and colonoscopy in 3 weeks.        Objective   Vital Signs:   /80   Pulse 105   Temp 97.6 °F (36.4 °C) (Temporal)   Ht 160 cm (63\")   Wt 68.9 kg (152 lb)   BMI 26.93 kg/m²     Physical Exam  Vitals reviewed.   Constitutional:       General: She is not in acute distress.     Appearance: Normal appearance. She is obese. She is not ill-appearing, toxic-appearing or diaphoretic.   HENT:      Head: Normocephalic.   Eyes:      General: No scleral icterus.  Cardiovascular:      Rate and Rhythm: Tachycardia present.   Pulmonary:      Effort: Pulmonary effort is normal. No respiratory distress.      Breath sounds: Normal breath sounds. No wheezing.   Abdominal:      General: Abdomen is flat. There is no distension.      Palpations: Abdomen is soft. There is no mass.      Tenderness: There is no abdominal tenderness.      Hernia: No hernia is present.      Comments: Midline incision and Gtube site are well healed.    Musculoskeletal:         General: Normal range of motion.      Cervical back: Normal range of motion and neck supple.   Skin:     General: Skin is warm and dry.      Coloration: Skin is not " jaundiced or pale.      Findings: Rash present.   Neurological:      General: No focal deficit present.      Mental Status: She is alert and oriented to person, place, and time.   Psychiatric:         Mood and Affect: Mood normal.         Behavior: Behavior normal.         Thought Content: Thought content normal.         Judgment: Judgment normal.        Result Review :                 Assessment and Plan    Diagnoses and all orders for this visit:    1. Gastric ulcer with perforation, unspecified chronicity (CMS/HCC) (Primary)      I spent 18 minutes caring for Jeanie on this date of service. This time includes time spent by me in the following activities:preparing for the visit, obtaining and/or reviewing a separately obtained history, performing a medically appropriate examination and/or evaluation , counseling and educating the patient/family/caregiver, referring and communicating with other health care professionals  and documenting information in the medical record  Follow Up   Return if symptoms worsen or fail to improve.  Patient was given instructions and counseling regarding her condition or for health maintenance advice. Please see specific information pulled into the AVS if appropriate.                 This document has been electronically signed by LEILANI Bartholomew on March 9, 2021 14:20 CST

## 2021-03-11 RX ORDER — POLYETHYLENE GLYCOL 3350, SODIUM SULFATE ANHYDROUS, SODIUM BICARBONATE, SODIUM CHLORIDE, POTASSIUM CHLORIDE 236; 22.74; 6.74; 5.86; 2.97 G/4L; G/4L; G/4L; G/4L; G/4L
4 POWDER, FOR SOLUTION ORAL ONCE
Qty: 4000 ML | Refills: 0 | Status: SHIPPED | OUTPATIENT
Start: 2021-03-11 | End: 2021-03-11

## 2021-03-11 RX ORDER — DEXTROSE AND SODIUM CHLORIDE 5; .45 G/100ML; G/100ML
30 INJECTION, SOLUTION INTRAVENOUS CONTINUOUS PRN
Status: CANCELLED | OUTPATIENT
Start: 2021-03-11

## 2021-03-12 PROBLEM — K92.1 MELENA: Status: ACTIVE | Noted: 2021-03-12

## 2021-03-22 RX ORDER — ONDANSETRON 4 MG/1
4 TABLET, ORALLY DISINTEGRATING ORAL EVERY 8 HOURS PRN
Qty: 20 TABLET | Refills: 2 | Status: SHIPPED | OUTPATIENT
Start: 2021-03-22 | End: 2021-04-13

## 2021-03-31 DIAGNOSIS — E11.65 TYPE 2 DIABETES MELLITUS WITH HYPERGLYCEMIA, WITH LONG-TERM CURRENT USE OF INSULIN (HCC): ICD-10-CM

## 2021-03-31 DIAGNOSIS — Z79.4 TYPE 2 DIABETES MELLITUS WITH HYPERGLYCEMIA, WITH LONG-TERM CURRENT USE OF INSULIN (HCC): ICD-10-CM

## 2021-03-31 RX ORDER — CANAGLIFLOZIN 100 MG/1
TABLET, FILM COATED ORAL
Qty: 30 TABLET | Refills: 5 | Status: SHIPPED | OUTPATIENT
Start: 2021-03-31 | End: 2021-09-13

## 2021-04-01 ENCOUNTER — CONSULT (OUTPATIENT)
Dept: ONCOLOGY | Facility: CLINIC | Age: 46
End: 2021-04-01

## 2021-04-01 ENCOUNTER — LAB (OUTPATIENT)
Dept: ONCOLOGY | Facility: HOSPITAL | Age: 46
End: 2021-04-01

## 2021-04-01 VITALS
SYSTOLIC BLOOD PRESSURE: 121 MMHG | RESPIRATION RATE: 16 BRPM | TEMPERATURE: 98.3 F | WEIGHT: 153.7 LBS | DIASTOLIC BLOOD PRESSURE: 83 MMHG | HEART RATE: 122 BPM | HEIGHT: 66 IN | BODY MASS INDEX: 24.7 KG/M2

## 2021-04-01 DIAGNOSIS — D72.828 OTHER ELEVATED WHITE BLOOD CELL (WBC) COUNT: ICD-10-CM

## 2021-04-01 DIAGNOSIS — T14.8XXA BRUISING: ICD-10-CM

## 2021-04-01 DIAGNOSIS — D75.1 ERYTHROCYTOSIS: Primary | ICD-10-CM

## 2021-04-01 DIAGNOSIS — R21 SKIN RASH: ICD-10-CM

## 2021-04-01 DIAGNOSIS — D75.1 ERYTHROCYTOSIS: ICD-10-CM

## 2021-04-01 DIAGNOSIS — D75.839 THROMBOCYTOSIS: ICD-10-CM

## 2021-04-01 LAB
BASOPHILS # BLD AUTO: 0.1 10*3/MM3 (ref 0–0.2)
BASOPHILS NFR BLD AUTO: 0.5 % (ref 0–1.5)
DEPRECATED RDW RBC AUTO: 55.4 FL (ref 37–54)
EOSINOPHIL # BLD AUTO: 0.15 10*3/MM3 (ref 0–0.4)
EOSINOPHIL NFR BLD AUTO: 0.8 % (ref 0.3–6.2)
ERYTHROCYTE [DISTWIDTH] IN BLOOD BY AUTOMATED COUNT: 18.4 % (ref 12.3–15.4)
FERRITIN SERPL-MCNC: 9.38 NG/ML (ref 13–150)
FOLATE SERPL-MCNC: 7.93 NG/ML (ref 4.78–24.2)
HCT VFR BLD AUTO: 40.6 % (ref 34–46.6)
HGB BLD-MCNC: 12.7 G/DL (ref 12–15.9)
IMM GRANULOCYTES # BLD AUTO: 0.12 10*3/MM3 (ref 0–0.05)
IMM GRANULOCYTES NFR BLD AUTO: 0.6 % (ref 0–0.5)
INR PPP: 0.85 (ref 0.8–1.2)
IRON 24H UR-MRATE: 21 MCG/DL (ref 37–145)
IRON SATN MFR SERPL: 4 % (ref 20–50)
LDH SERPL-CCNC: 202 U/L (ref 135–214)
LYMPHOCYTES # BLD AUTO: 2.21 10*3/MM3 (ref 0.7–3.1)
LYMPHOCYTES NFR BLD AUTO: 11.3 % (ref 19.6–45.3)
MCH RBC QN AUTO: 26.3 PG (ref 26.6–33)
MCHC RBC AUTO-ENTMCNC: 31.3 G/DL (ref 31.5–35.7)
MCV RBC AUTO: 84.1 FL (ref 79–97)
MONOCYTES # BLD AUTO: 1.16 10*3/MM3 (ref 0.1–0.9)
MONOCYTES NFR BLD AUTO: 6 % (ref 5–12)
NEUTROPHILS NFR BLD AUTO: 15.75 10*3/MM3 (ref 1.7–7)
NEUTROPHILS NFR BLD AUTO: 80.8 % (ref 42.7–76)
NRBC BLD AUTO-RTO: 0 /100 WBC (ref 0–0.2)
PLATELET # BLD AUTO: 364 10*3/MM3 (ref 140–450)
PMV BLD AUTO: 8.5 FL (ref 6–12)
PROTHROMBIN TIME: 11.9 SECONDS (ref 11.1–15.3)
RBC # BLD AUTO: 4.83 10*6/MM3 (ref 3.77–5.28)
TIBC SERPL-MCNC: 550 MCG/DL (ref 298–536)
TRANSFERRIN SERPL-MCNC: 369 MG/DL (ref 200–360)
WBC # BLD AUTO: 19.49 10*3/MM3 (ref 3.4–10.8)

## 2021-04-01 PROCEDURE — 83520 IMMUNOASSAY QUANT NOS NONAB: CPT | Performed by: INTERNAL MEDICINE

## 2021-04-01 PROCEDURE — 82746 ASSAY OF FOLIC ACID SERUM: CPT | Performed by: INTERNAL MEDICINE

## 2021-04-01 PROCEDURE — 85246 CLOT FACTOR VIII VW ANTIGEN: CPT | Performed by: INTERNAL MEDICINE

## 2021-04-01 PROCEDURE — G9902 PT SCRN TBCO AND ID AS USER: HCPCS | Performed by: INTERNAL MEDICINE

## 2021-04-01 PROCEDURE — G0463 HOSPITAL OUTPT CLINIC VISIT: HCPCS | Performed by: INTERNAL MEDICINE

## 2021-04-01 PROCEDURE — 85730 THROMBOPLASTIN TIME PARTIAL: CPT | Performed by: INTERNAL MEDICINE

## 2021-04-01 PROCEDURE — 99406 BEHAV CHNG SMOKING 3-10 MIN: CPT | Performed by: INTERNAL MEDICINE

## 2021-04-01 PROCEDURE — 83615 LACTATE (LD) (LDH) ENZYME: CPT | Performed by: INTERNAL MEDICINE

## 2021-04-01 PROCEDURE — 1125F AMNT PAIN NOTED PAIN PRSNT: CPT | Performed by: INTERNAL MEDICINE

## 2021-04-01 PROCEDURE — 85025 COMPLETE CBC W/AUTO DIFF WBC: CPT | Performed by: INTERNAL MEDICINE

## 2021-04-01 PROCEDURE — 85245 CLOT FACTOR VIII VW RISTOCTN: CPT | Performed by: INTERNAL MEDICINE

## 2021-04-01 PROCEDURE — 99204 OFFICE O/P NEW MOD 45 MIN: CPT | Performed by: INTERNAL MEDICINE

## 2021-04-01 PROCEDURE — 85610 PROTHROMBIN TIME: CPT | Performed by: INTERNAL MEDICINE

## 2021-04-01 PROCEDURE — 85240 CLOT FACTOR VIII AHG 1 STAGE: CPT | Performed by: INTERNAL MEDICINE

## 2021-04-01 PROCEDURE — 84466 ASSAY OF TRANSFERRIN: CPT | Performed by: INTERNAL MEDICINE

## 2021-04-01 PROCEDURE — 82728 ASSAY OF FERRITIN: CPT | Performed by: INTERNAL MEDICINE

## 2021-04-01 PROCEDURE — 83540 ASSAY OF IRON: CPT | Performed by: INTERNAL MEDICINE

## 2021-04-01 RX ORDER — POLYETHYLENE GLYCOL-3350 AND ELECTROLYTES 236; 6.74; 5.86; 2.97; 22.74 G/274.31G; G/274.31G; G/274.31G; G/274.31G; G/274.31G
POWDER, FOR SOLUTION ORAL
COMMUNITY
Start: 2021-03-12 | End: 2021-04-05

## 2021-04-01 RX ORDER — POTASSIUM CHLORIDE 20 MEQ/1
20 TABLET, EXTENDED RELEASE ORAL 2 TIMES DAILY
Status: ON HOLD | COMMUNITY
Start: 2021-03-19 | End: 2022-05-28

## 2021-04-01 NOTE — PROGRESS NOTES
DATE OF CONSULT: 2021    REQUESTING SOURCE:  UkiahParth, LEILANI  [address] [place] [address]      REASON FOR CONSULTATION: Erythrocytosis, leukocytosis, thrombocytosis, easy bruising, skin rash, skin rash with pruritus      HISTORY OF PRESENT ILLNESS:    45-year-old female with medical problem consisting of hypertension, dyslipidemia, diabetes mellitus, anxiety/depression, questions disease for which patient is currently on prednisone, history of kidney stones, history of gastric surgery done in 2020 for peptic ulcer disease with perforation has been referred to [facility] for further evaluation and recommendation regarding abnormal CBC showing erythrocytosis, leukocytosis and thrombocytosis as well as bruising.  Patient states she has started noticing more bruising recently over the last 3 to 4 weeks.  Complains of worsening skin rash along with pruritus.  Denies any spontaneous bruising.  States any minimal trauma results in significant bruising afterwards.  Denies any history of major bleeding with her previous surgery including hysterectomy, oophorectomy, gastric surgery or teeth extraction.  Denies any family history of bleeding disorder.  Denies any history of blood transfusion.  Denies any history of DVT or pulmonary embolism.        PAST MEDICAL HISTORY:    Past Medical History:   Diagnosis Date   • Anxiety    • Cushing disease (CMS/HCC)    • Depression    • Diabetes mellitus (CMS/HCC)    • GERD (gastroesophageal reflux disease)     OCCASIONALLY   • Kidney stones    • Low back pain    • MRSA infection    • Multiple joint pain    • Other long term (current) drug therapy    • Pancreatitis        PAST SURGICAL HISTORY:  Past Surgical History:   Procedure Laterality Date   • ADENOIDECTOMY     • APPENDECTOMY     •  SECTION     • CHOLECYSTECTOMY     • CYSTOSCOPY, URETEROSCOPY, RETROGRADE PYELOGRAM, STENT INSERTION Right 2020    Procedure: CYSTOSCOPY, RIGHT  RETROGRAE, URETEROSCOPY, LASER LITHOTRIPSY, STENT PLACEMENT  LATEX ALLERGY;  Surgeon: Bakari Starr MD;  Location: [facility] OR;  Service: Urology;  Laterality: Right;   • EXPLORATORY LAPAROTOMY N/A 10/24/2020    Procedure: LAPAROTOMY EXPLORATORY AND CLOSURE OF GASTRIC ULCER,;  Surgeon: Valeriano Goldberg MD;  Location: [facility];  Service: General;  Laterality: N/A;   • EXPLORATORY LAPAROTOMY     • FRACTURE SURGERY Left     LLE with plates and screws   • HYSTERECTOMY     • INJECTION OF MEDICATION  12/02/2015    Toradol (1)      • TONSILLECTOMY         ALLERGIES:    Allergies   Allergen Reactions   • Adhesive Tape Unknown - Low Severity     Tares skin    • Bactrim [Sulfamethoxazole-Trimethoprim] Hives   • Ciprofloxacin Hives   • Latex Rash     Only when pt wears latex gloves        SOCIAL HISTORY:   Social History     Tobacco Use   • Smoking status: Current Every Day Smoker     Packs/day: 1.00     Years: 20.00     Pack years: 20.00     Types: Cigarettes   • Smokeless tobacco: Never Used   Vaping Use   • Vaping Use: Some days   • Substances: Nicotine, Flavoring   • Devices: Disposable   Substance Use Topics   • Alcohol use: No   • Drug use: No       CURRENT MEDICATIONS:    Current Outpatient Medications   Medication Sig Dispense Refill   • Buprenorphine ER (Sublocade) 300 MG/1.5ML solution prefilled syringe Inject  under the skin into the appropriate area as directed.     • Continuous Blood Gluc  (FreeStyle Tracee 2 [place] Systm) device 1 each Continuous. Use as indicated for glucose monitoring 1 each 1   • Continuous Blood Gluc Sensor (FreeStyle Tracee 2 Sensor Systm) misc 1 each Every 14 (Fourteen) Days. Use every 14 days 2 each 11   • dilTIAZem (CARDIZEM) 30 MG tablet Take 1 tablet by mouth 3 (Three) Times a Day. 90 tablet 1   • DULoxetine (CYMBALTA) 60 MG capsule Take 60 mg by mouth Daily.     • escitalopram (LEXAPRO) 10 MG tablet Take 10 mg by mouth Daily.     • famotidine (PEPCID) 40 MG tablet Take 40 mg by  mouth Daily.     • fluconazole (DIFLUCAN) 150 MG tablet Take 1 tablet day of infection and 1 tablet 3 days later 2 tablet 6   • GaviLyte-G 236 g solution      • glucagon (Glucagon Emergency) 1 MG injection Inject 1 mg under the skin into the appropriate area as directed 1 (One) Time As Needed for Low Blood Sugar for up to 1 dose. 1 kit 6   • hydrOXYzine (ATARAX) 50 MG tablet Take 50 mg by mouth As Needed.     • Insulin Lispro, 1 Unit Dial, (HumaLOG KwikPen) 100 UNIT/ML solution pen-injector Use up to 20 units 3 times a day with meals 6 pen 11   • Insulin Pen Needle (PEN NEEDLES) 32G X 4 MM misc 1 each 4 (Four) Times a Day. Use 4 x daily, Dx code E11.65 120 each 11   • Invokana 100 MG tablet tablet TAKE 1 TABLET BY MOUTH EVERY DAY 30 tablet 5   • Lantus SoloStar 100 UNIT/ML injection pen      • lisinopril (PRINIVIL,ZESTRIL) 2.5 MG tablet Take 2.5 mg by mouth Daily.     • ondansetron ODT (ZOFRAN-ODT) 4 MG disintegrating tablet PLACE 1 TABLET ON THE TONGUE EVERY 8 (EIGHT) HOURS AS NEEDED FOR NAUSEA OR VOMITING. 20 tablet 2   • pantoprazole (PROTONIX) 40 MG EC tablet Daily.     • potassium chloride (K-DUR,KLOR-CON) 20 MEQ CR tablet Take 20 mEq by mouth 2 (Two) Times a Day.     • predniSONE (DELTASONE) 5 MG tablet Take 5 mg by mouth 2 (Two) Times a Day.     • rosuvastatin (CRESTOR) 20 MG tablet Daily.     • Semaglutide,0.25 or 0.5MG/DOS, (Ozempic, 0.25 or 0.5 MG/DOSE,) 2 MG/1.5ML solution pen-injector Inject 0.5 mg under the skin into the appropriate area as directed 1 (One) Time Per Week. 3 pen 11   • tiZANidine (ZANAFLEX) 4 MG tablet Take 4 mg by mouth Every 8 (Eight) Hours As Needed for Muscle Spasms.     • rosuvastatin (CRESTOR) 5 MG tablet Take 1 tablet by mouth Every Night. (Patient taking differently: Take 20 mg by mouth Every Night.) 30 tablet 5     No current facility-administered medications for this visit.        HOME MEDICATIONS:   Current Outpatient Medications on File Prior to Visit   Medication Sig  Dispense Refill   • Buprenorphine ER (Sublocade) 300 MG/1.5ML solution prefilled syringe Inject  under the skin into the appropriate area as directed.     • Continuous Blood Gluc  (FreeStyle Tracee 2 [place] Systm) device 1 each Continuous. Use as indicated for glucose monitoring 1 each 1   • Continuous Blood Gluc Sensor (FreeStyle Tracee 2 Sensor Systm) misc 1 each Every 14 (Fourteen) Days. Use every 14 days 2 each 11   • dilTIAZem (CARDIZEM) 30 MG tablet Take 1 tablet by mouth 3 (Three) Times a Day. 90 tablet 1   • DULoxetine (CYMBALTA) 60 MG capsule Take 60 mg by mouth Daily.     • escitalopram (LEXAPRO) 10 MG tablet Take 10 mg by mouth Daily.     • famotidine (PEPCID) 40 MG tablet Take 40 mg by mouth Daily.     • fluconazole (DIFLUCAN) 150 MG tablet Take 1 tablet day of infection and 1 tablet 3 days later 2 tablet 6   • GaviLyte-G 236 g solution      • glucagon (Glucagon Emergency) 1 MG injection Inject 1 mg under the skin into the appropriate area as directed 1 (One) Time As Needed for Low Blood Sugar for up to 1 dose. 1 kit 6   • hydrOXYzine (ATARAX) 50 MG tablet Take 50 mg by mouth As Needed.     • Insulin Lispro, 1 Unit Dial, (HumaLOG KwikPen) 100 UNIT/ML solution pen-injector Use up to 20 units 3 times a day with meals 6 pen 11   • Insulin Pen Needle (PEN NEEDLES) 32G X 4 MM misc 1 each 4 (Four) Times a Day. Use 4 x daily, Dx code E11.65 120 each 11   • Invokana 100 MG tablet tablet TAKE 1 TABLET BY MOUTH EVERY DAY 30 tablet 5   • Lantus SoloStar 100 UNIT/ML injection pen      • lisinopril (PRINIVIL,ZESTRIL) 2.5 MG tablet Take 2.5 mg by mouth Daily.     • ondansetron ODT (ZOFRAN-ODT) 4 MG disintegrating tablet PLACE 1 TABLET ON THE TONGUE EVERY 8 (EIGHT) HOURS AS NEEDED FOR NAUSEA OR VOMITING. 20 tablet 2   • pantoprazole (PROTONIX) 40 MG EC tablet Daily.     • potassium chloride (K-DUR,KLOR-CON) 20 MEQ CR tablet Take 20 mEq by mouth 2 (Two) Times a Day.     • predniSONE (DELTASONE) 5 MG tablet Take 5  mg by mouth 2 (Two) Times a Day.     • rosuvastatin (CRESTOR) 20 MG tablet Daily.     • Semaglutide,0.25 or 0.5MG/DOS, (Ozempic, 0.25 or 0.5 MG/DOSE,) 2 MG/1.5ML solution pen-injector Inject 0.5 mg under the skin into the appropriate area as directed 1 (One) Time Per Week. 3 pen 11   • tiZANidine (ZANAFLEX) 4 MG tablet Take 4 mg by mouth Every 8 (Eight) Hours As Needed for Muscle Spasms.     • rosuvastatin (CRESTOR) 5 MG tablet Take 1 tablet by mouth Every Night. (Patient taking differently: Take 20 mg by mouth Every Night.) 30 tablet 5     No current facility-administered medications on file prior to visit.       FAMILY HISTORY:    Family History   Problem Relation Age of Onset   • Rheum arthritis Mother    • Cancer Mother         lung cancer   • Heart disease Mother    • Cancer Father    • No Known Problems Sister    • No Known Problems Brother    • Diabetes Maternal Grandmother    • No Known Problems Maternal Grandfather    • No Known Problems Paternal Grandmother    • No Known Problems Paternal Grandfather    • No Known Problems Sister    • Cerebral palsy Child        REVIEW OF SYSTEMS:        CONSTITUTIONAL:  Complains of fatigue.  Positive for 10 pound of weight loss since gastric surgery in October 2020.  Denies any fever, chills .     EYES: No visual disturbances. No discharge. No new lesions    ENMT:  No epistaxis, mouth sores or difficulty swallowing.    RESPIRATORY: Positive for chronic shortness of breath. No new cough or hemoptysis.    CARDIOVASCULAR:  No chest pain or palpitations.    GASTROINTESTINAL: Complains of dark stools.  Complains of nausea without vomiting.  Denies any abdominal pain.    GENITOURINARY: Deferred chronic intermittent hematuria for which she is being followed by Dr. Starr    MUSCULOSKELETAL: Positive for arthritis pain    LYMPHATICS:  Denies any abnormal swollen glands anywhere in the body.    NEUROLOGICAL : No tingling or numbness. No headache or dizziness. No seizures or  "balance problems.    SKIN: Complains of easy bruising.  Complains of diffuse erythematous skin rash with pruritus which is been getting worse over the last 2 years.    ENDOCRINE : No new hot or cold intolerance. No new polyuria . No polydipsia.        PHYSICAL EXAMINATION:      VITAL SIGNS:  /83   Pulse (!) 122   Temp 98.3 °F (36.8 °C)   Resp 16   Ht 167.6 cm (66\")   Wt 69.7 kg (153 lb 11.2 oz)   BMI 24.81 kg/m²       04/01/21  1425   Weight: 69.7 kg (153 lb 11.2 oz)       ECOG performance status: 1    CONSTITUTIONAL:  Not in any distress.    EYES: Mild conjunctival Pallor. No Icterus. No Pterygium. Extraocular Movements intact.No ptosis.    ENMT:  Normocephalic, Atraumatic.No Facial Asymmetry noted.  Buttlerfly erythematous skin rash present on face    NECK:  No adenopathy.Trachea midline. NO JVD.    RESPIRATORY:  Fair air entry bilateral. No rhonchi or wheezing.Fair respiratory effort.    CARDIOVASCULAR:  S1, S2. Regular rate and rhythm. No murmur or gallop appreciated.    ABDOMEN:  Soft, obese, nontender. Bowel sounds present in all four quadrants.  No Hepatosplenomegaly appreciated.    MUSCULOSKELETAL:  No edema.No Calf Tenderness.Decreased range of motion.    NEUROLOGIC:    No  Motor  deficit appreciated. Cranial Nerves 2-12 grossly intact.    SKIN : Diffuse erythematous skin rash present on bilateral upper extremity.  Excoriation present at places on upper extremity.    LYMPHATICS: No new enlarged lymph nodes in neck or supraclavicular area.    PSYCHIATRY: Alert, awake and oriented ×3.Appears anxious.  Normal judgment.  Makes good eye contact.          DIAGNOSTIC DATA:    WBC   Date Value Ref Range Status   04/01/2021 19.49 (H) 3.40 - 10.80 10*3/mm3 Final   12/06/2019 20.2 (H) 4.0 - 11.0 10*3/uL Final     RBC   Date Value Ref Range Status   04/01/2021 4.83 3.77 - 5.28 10*6/mm3 Final   12/06/2019 4.29 4.15 - 4.87 10*6/uL Final     Hemoglobin   Date Value Ref Range Status   04/01/2021 12.7 12.0 - " 15.9 g/dL Final   12/06/2019 13.2 12.7 - 14.7 g/dL Final     Hematocrit   Date Value Ref Range Status   04/01/2021 40.6 34.0 - 46.6 % Final   12/06/2019 40.6 37.9 - 43.9 % Final     MCV   Date Value Ref Range Status   04/01/2021 84.1 79.0 - 97.0 fL Final   12/06/2019 95 85 - 95 fL Final     MCH   Date Value Ref Range Status   04/01/2021 26.3 (L) 26.6 - 33.0 pg Final   12/06/2019 30.8 28.0 - 32.0 pg Final     MCHC   Date Value Ref Range Status   04/01/2021 31.3 (L) 31.5 - 35.7 g/dL Final   12/06/2019 32.5 32.0 - 34.0 g/dL Final     RDW   Date Value Ref Range Status   04/01/2021 18.4 (H) 12.3 - 15.4 % Final   12/06/2019 46 37 - 54 fL Final     RDW-SD   Date Value Ref Range Status   04/01/2021 55.4 (H) 37.0 - 54.0 fl Final     MPV   Date Value Ref Range Status   04/01/2021 8.5 6.0 - 12.0 fL Final   12/06/2019 9.2 8.0 - 12.0 fL Final     Platelets   Date Value Ref Range Status   04/01/2021 364 140 - 450 10*3/mm3 Final   12/06/2019 414 150 - 450 10*3/uL Final     Neutrophil %   Date Value Ref Range Status   04/01/2021 80.8 (H) 42.7 - 76.0 % Final     Lymphocyte Rel %   Date Value Ref Range Status   12/06/2019 28.5 5 - 55 % Final     Lymphocyte %   Date Value Ref Range Status   04/01/2021 11.3 (L) 19.6 - 45.3 % Final     Monocyte Rel %   Date Value Ref Range Status   12/06/2019 5.1 3 - 8 % Final     Monocyte %   Date Value Ref Range Status   04/01/2021 6.0 5.0 - 12.0 % Final     Eosinophil Rel %   Date Value Ref Range Status   12/06/2019 1.5 0 - 5 % Final     Eosinophil %   Date Value Ref Range Status   04/01/2021 0.8 0.3 - 6.2 % Final     Basophil Rel %   Date Value Ref Range Status   12/06/2019 0.6 0 - 2 % Final     Basophil %   Date Value Ref Range Status   04/01/2021 0.5 0.0 - 1.5 % Final     Immature Grans %   Date Value Ref Range Status   04/01/2021 0.6 (H) 0.0 - 0.5 % Final   12/06/2019 63.8 45 - 80 % Final     Neutrophils, Absolute   Date Value Ref Range Status   04/01/2021 15.75 (H) 1.70 - 7.00 10*3/mm3 Final      Lymphocytes, Absolute   Date Value Ref Range Status   04/01/2021 2.21 0.70 - 3.10 10*3/mm3 Final     Monocytes, Absolute   Date Value Ref Range Status   04/01/2021 1.16 (H) 0.10 - 0.90 10*3/mm3 Final     Eosinophils, Absolute   Date Value Ref Range Status   04/01/2021 0.15 0.00 - 0.40 10*3/mm3 Final     Basophils, Absolute   Date Value Ref Range Status   04/01/2021 0.10 0.00 - 0.20 10*3/mm3 Final     Immature Grans, Absolute   Date Value Ref Range Status   04/01/2021 0.12 (H) 0.00 - 0.05 10*3/mm3 Final     nRBC   Date Value Ref Range Status   04/01/2021 0.0 0.0 - 0.2 /100 WBC Final     Glucose   Date Value Ref Range Status   10/26/2020 62 (L) 65 - 99 mg/dL Final     Glucose, Arterial   Date Value Ref Range Status   10/24/2020 137 (H) 65 - 95 mmol/L Final     Sodium   Date Value Ref Range Status   10/26/2020 139 136 - 145 mmol/L Final     Potassium   Date Value Ref Range Status   10/27/2020 3.4 (L) 3.5 - 5.2 mmol/L Final     CO2   Date Value Ref Range Status   10/26/2020 29.0 22.0 - 29.0 mmol/L Final     Chloride   Date Value Ref Range Status   10/26/2020 99 98 - 107 mmol/L Final     Anion Gap   Date Value Ref Range Status   10/26/2020 11.0 5.0 - 15.0 mmol/L Final     Creatinine   Date Value Ref Range Status   10/26/2020 0.64 0.57 - 1.00 mg/dL Final     BUN   Date Value Ref Range Status   10/26/2020 12 6 - 20 mg/dL Final     BUN/Creatinine Ratio   Date Value Ref Range Status   10/26/2020 18.8 7.0 - 25.0 Final     Calcium   Date Value Ref Range Status   10/26/2020 8.6 8.6 - 10.5 mg/dL Final     eGFR Non  Amer   Date Value Ref Range Status   10/26/2020 101 >60 mL/min/1.73 Final     Alkaline Phosphatase   Date Value Ref Range Status   10/24/2020 45 39 - 117 U/L Final     Total Protein   Date Value Ref Range Status   10/24/2020 6.9 6.0 - 8.5 g/dL Final     ALT (SGPT)   Date Value Ref Range Status   10/24/2020 16 1 - 33 U/L Final     AST (SGOT)   Date Value Ref Range Status   10/24/2020 12 1 - 32 U/L Final      Total Bilirubin   Date Value Ref Range Status   10/24/2020 0.2 0.0 - 1.2 mg/dL Final     Albumin   Date Value Ref Range Status   10/24/2020 4.30 3.50 - 5.20 g/dL Final     Globulin   Date Value Ref Range Status   10/24/2020 2.6 gm/dL Final     Lab Results   Component Value Date    IRON 21 (L) 04/01/2021    TIBC 550 (H) 04/01/2021    LABIRON 4 (L) 04/01/2021    FERRITIN 9.38 (L) 04/01/2021    IODFTMFP24 294 03/10/2021    FOLATE 7.93 04/01/2021     Lab Results   Component Value Date    [id number] 5.4 08/20/2020         CBC AND DIFFERENTIAL  Order: [id number]  (suggestion)  Result Information displayed in this report will not trend and may not trigger automated decision support.   Component   Ref Range & Units 3 wk ago   WBC   4.0 - 11.0 10*3/uL 16.3High        RBC   4.15 - 4.87 10*6/uL 5.29High        Hemoglobin   12.7 - 14.7 g/dL 13.8       Hematocrit   37.9 - 43.9 % 44.8High        MCV   85.0 - 95.0 fL 84.7Low        MCH   28.0 - 32.0 pg 26.1Low        MCHC   32.0 - 34.0 g/dL 30.8Low        RDW   37.0 - 54.0 fL 56.9High        Platelets   150 - 450 10*3/uL 499High        IPF   1.0 - 5.0 % 1.9       MPV   8.0 - 12.0 fL 8.7       nRBC   0.0 % 0.0       Absolute NRBC -mch   0.000 10*3/uL 0.000       Immature Grans %   45.0 - 80.0 % 50.0       Lymphocyte Rel %   20.0 - 45.0 % 39.8       Monocyte Rel %   3.0 - 8.0 % 7.5       Eosinophil Rel %   0.0 - 5.0 % 1.5       Basophil Rel %   0.0 - 2.0 % 0.7       Immature Granulocyte Rel %   0.0 - 1.0 % 0.5       Neutrophils Absolute   1.800 - 7.700 10*3/uL 8.160High        Lymphocytes Absolute   0.800 - 4.800 10*3/uL 6.490High        Monocytes Absolute   0.200 - 0.900 10*3/uL 1.230High        Eosinophils Absolute   0.000 - 0.800 10*3/uL 0.240       Basophils Absolute   0.000 - 0.100 10*3/uL 0.110High        Absolute IG-mch   0.000 - 0.100 10*3/uL 0.080       Differential Type     AUTO DIFF       Platelet Estimate     ADEQUATE       RBC Morphology     NOTED   ANISOCYTOSIS        RBC Morphology     Performed at [facility], [address] [place] [address]       Resulting Agency [facility]   Specimen Collected: 03/10/21 09:16 Last Resulted: 03/10/21 13:01   Received From: [facility]  Result Received: 03/29/21 11:37               Radiology Data :  No radiology results for the last 7 days    Pathology :  * Cannot find OR log *      ASSESSMENT AND PLAN:      1.  Easy abnormal bruising:  -Likely secondary to continuous use of prednisone for question disease plus or minus qualitative platelet dysfunction from Cymbalta and Lexapro with Zanaflex.  -Patient does not have any history suggestive of major bleeding disorder since she has multiple surgeries done in the past without any hemorrhage after the surgery as well as teeth extraction without any significant bleeding.  -We will do blood work today consisting of PT/INR as well as von Willebrand profile.  -We will ask patient to return to clinic in 2 weeks to go over the results of blood work and further recommendation.    2.  Leukocytosis:  -Patient has a leukocytosis with white blood cell count of 15,000  -CBC from today shows him white blood cell count is 19,000.  -Secondary to prednisone plus or minus inflammation.  -We will recheck CBC today.    3.  Erythrocytosis and thrombocytosis:  -Likely reactive.  -CBC done on March 10, 2021 shows hematocrit of 44.8 and platelet count of 499,000.  -Repeat CBC today on April 1, 2021 shows hemoglobin is 12.7, platelet count is 364,000.  -Patient does smoke that can contribute to erythrocytosis.  -We will recheck CBC today if patient has persistent erythrocytosis or thrombocytosis.  We will do additional work-up for myeloproliferative neoplasm.    4.  Iron deficiency:  -Anemia work-up today shows severe iron deficiency with ferritin of 9 and iron saturation of 4%.  Patient does have some ongoing melena.  She is scheduled to undergo EGD  and colonoscopy by Dr. Santillan on April 6, 2021.  -We will start patient on ferrous sulfate 1 tablet p.o. daily with stool softener after her endoscopy.  We'll also start patient on B12 and folic acid.  -Prescription for ferrous sulfate, B12, folic acid and Colace has been sent to her pharmacy today    4.  Skin rash with pruritus:  -Patient has been seen by dermatologist in [facility] 2 years ago.  -Since patient is complaining of worsening skin rash with pruritus, will refer her to dermatology clinic.    5.  Cushing's disease:  -Remains on prednisone.    6.  Diabetes mellitus    7.  Dark stool with vomiting:  -Patient is scheduled for EGD and colonoscopy on April 6, 2021.    8. Jeanie Jones  reports that she has been smoking cigarettes. She has a 20.00 pack-year smoking history. She has never used smokeless tobacco.. I have educated her on the risk of diseases from using tobacco products such as cancer, COPD, heart disease and cataracts.     I advised her to quit and she is willing to quit. We have discussed the following method/s for tobacco cessation:  Counseling.  Together we have set a quit date for 1 month from today.  She will follow up with me in 2 weeks or sooner to check on her progress.    I spent 3.5 minutes counseling the patient.        9. Advance Care Planning: For now patient remains full code and is able to make decisions.  Patient has health care surrogate mentioned on chart.      PHQ-9 Total Score: 9   -Patient is not homicidal or suicidal.  No acute intervention required.      Jeanie Jones reports a pain score of 4.  Given her pain assessment as noted, treatment options were discussed and the following options were decided upon as a follow-up plan to address the patient's pain: continuation of current treatment plan for pain.             Thank you for this consultation.    Darnell Reyes MD  4/2/2021  06:59 CDT        Part of this note may be an electronic  transcription/translation of spoken language to printed text using the Dragon Dictation System.

## 2021-04-02 ENCOUNTER — TELEPHONE (OUTPATIENT)
Dept: ONCOLOGY | Facility: HOSPITAL | Age: 46
End: 2021-04-02

## 2021-04-02 RX ORDER — DOCUSATE SODIUM 100 MG/1
100 CAPSULE, LIQUID FILLED ORAL 2 TIMES DAILY PRN
Qty: 60 CAPSULE | Refills: 2 | Status: SHIPPED | OUTPATIENT
Start: 2021-04-02 | End: 2022-06-17 | Stop reason: SDUPTHER

## 2021-04-02 RX ORDER — FOLIC ACID 1 MG/1
1 TABLET ORAL DAILY
Qty: 90 TABLET | Refills: 1 | Status: SHIPPED | OUTPATIENT
Start: 2021-04-02 | End: 2022-06-18 | Stop reason: SDUPTHER

## 2021-04-02 RX ORDER — LANOLIN ALCOHOL/MO/W.PET/CERES
1000 CREAM (GRAM) TOPICAL DAILY
Qty: 90 TABLET | Refills: 1 | Status: SHIPPED | OUTPATIENT
Start: 2021-04-02 | End: 2022-06-18 | Stop reason: SDUPTHER

## 2021-04-02 RX ORDER — FERROUS SULFATE 325(65) MG
325 TABLET ORAL
Qty: 30 TABLET | Refills: 3 | Status: SHIPPED | OUTPATIENT
Start: 2021-04-02 | End: 2022-06-17 | Stop reason: SDUPTHER

## 2021-04-02 NOTE — TELEPHONE ENCOUNTER
----- Message from Darnell Reyes MD sent at 4/2/2021  7:04 AM CDT -----  Please let patient know, white blood cell count was again elevated at 19,000 today.  Her hemoglobin is normal at 12.4, platelet count is normal at 364,000 today.  Her iron studies show severe iron deficiency.  I have sent prescription for ferrous sulfate and Colace to her pharmacy that she can start taking after her endoscopy next week.  She also needs to start taking B12 and folic acid daily.  I have sent prescription for B12 and folic acid to her pharmacy as well.  Thank you

## 2021-04-03 ENCOUNTER — LAB (OUTPATIENT)
Dept: LAB | Facility: HOSPITAL | Age: 46
End: 2021-04-03

## 2021-04-03 DIAGNOSIS — Z01.818 PREOP TESTING: Primary | ICD-10-CM

## 2021-04-03 LAB — SARS-COV-2 N GENE RESP QL NAA+PROBE: NOT DETECTED

## 2021-04-03 PROCEDURE — C9803 HOPD COVID-19 SPEC COLLECT: HCPCS

## 2021-04-03 PROCEDURE — 87635 SARS-COV-2 COVID-19 AMP PRB: CPT

## 2021-04-05 ENCOUNTER — TELEPHONE (OUTPATIENT)
Dept: ONCOLOGY | Facility: HOSPITAL | Age: 46
End: 2021-04-05

## 2021-04-05 NOTE — TELEPHONE ENCOUNTER
Called and spoke with pt.  After verifying name and , lab results given along with instructions for taking medications as ordered by Dr. Reyes.  V/u obtained.

## 2021-04-06 ENCOUNTER — ANESTHESIA EVENT (OUTPATIENT)
Dept: GASTROENTEROLOGY | Facility: HOSPITAL | Age: 46
End: 2021-04-06

## 2021-04-06 ENCOUNTER — HOSPITAL ENCOUNTER (OUTPATIENT)
Facility: HOSPITAL | Age: 46
Setting detail: HOSPITAL OUTPATIENT SURGERY
Discharge: HOME | End: 2021-04-06
Attending: INTERNAL MEDICINE | Admitting: INTERNAL MEDICINE

## 2021-04-06 ENCOUNTER — ANESTHESIA (OUTPATIENT)
Dept: GASTROENTEROLOGY | Facility: HOSPITAL | Age: 46
End: 2021-04-06

## 2021-04-06 VITALS
BODY MASS INDEX: 24.88 KG/M2 | DIASTOLIC BLOOD PRESSURE: 85 MMHG | HEART RATE: 105 BPM | HEIGHT: 66 IN | SYSTOLIC BLOOD PRESSURE: 137 MMHG | TEMPERATURE: 97.7 F | WEIGHT: 154.8 LBS | OXYGEN SATURATION: 97 % | RESPIRATION RATE: 18 BRPM

## 2021-04-06 DIAGNOSIS — K27.9 PEPTIC ULCER DISEASE: ICD-10-CM

## 2021-04-06 PROCEDURE — 43235 EGD DIAGNOSTIC BRUSH WASH: CPT | Performed by: INTERNAL MEDICINE

## 2021-04-06 PROCEDURE — 25010000002 PROPOFOL 10 MG/ML EMULSION: Performed by: NURSE ANESTHETIST, CERTIFIED REGISTERED

## 2021-04-06 RX ORDER — DEXTROSE AND SODIUM CHLORIDE 5; .45 G/100ML; G/100ML
30 INJECTION, SOLUTION INTRAVENOUS CONTINUOUS PRN
Status: DISCONTINUED | OUTPATIENT
Start: 2021-04-06 | End: 2021-04-06 | Stop reason: HOSPADM

## 2021-04-06 RX ORDER — PROPOFOL 10 MG/ML
VIAL (ML) INTRAVENOUS AS NEEDED
Status: DISCONTINUED | OUTPATIENT
Start: 2021-04-06 | End: 2021-04-06 | Stop reason: SURG

## 2021-04-06 RX ORDER — LIDOCAINE HYDROCHLORIDE 20 MG/ML
INJECTION, SOLUTION EPIDURAL; INFILTRATION; INTRACAUDAL; PERINEURAL AS NEEDED
Status: DISCONTINUED | OUTPATIENT
Start: 2021-04-06 | End: 2021-04-06 | Stop reason: SURG

## 2021-04-06 RX ADMIN — DEXTROSE AND SODIUM CHLORIDE 30 ML/HR: 5; 450 INJECTION, SOLUTION INTRAVENOUS at 10:34

## 2021-04-06 RX ADMIN — LIDOCAINE HYDROCHLORIDE 50 MG: 20 INJECTION, SOLUTION EPIDURAL; INFILTRATION; INTRACAUDAL; PERINEURAL at 10:42

## 2021-04-06 RX ADMIN — PROPOFOL 100 MG: 10 INJECTION, EMULSION INTRAVENOUS at 10:42

## 2021-04-06 NOTE — ANESTHESIA PREPROCEDURE EVALUATION
Anesthesia Evaluation     NPO Solid Status: > 8 hours  NPO Liquid Status: > 8 hours           Airway   Mallampati: II  TM distance: >3 FB  Neck ROM: full  Dental    (+) edentulous    Pulmonary    (+) a smoker Current Smoked day of surgery,   Cardiovascular     (+) hypertension well controlled less than 2 medications, hyperlipidemia,       Neuro/Psych  (+) psychiatric history Anxiety,     GI/Hepatic/Renal/Endo    (+)  GERD poorly controlled, PUD (post gastric ulcer repair),  diabetes mellitus type 2 well controlled using insulin,     Musculoskeletal     (+) arthralgias,   Abdominal    Substance History - negative use     OB/GYN negative ob/gyn ROS         Other   arthritis,      ROS/Med Hx Other: Cushing's                  Anesthesia Plan    ASA 3     MAC       Anesthetic plan, all risks, benefits, and alternatives have been provided, discussed and informed consent has been obtained with: patient.

## 2021-04-06 NOTE — NURSING NOTE
pt states she drank entire golytely prep with no results;  notified and decided to cancel colonoscopy and only do EGD

## 2021-04-06 NOTE — ANESTHESIA POSTPROCEDURE EVALUATION
Patient: Jeanie Jones    Procedure Summary     Date: 04/06/21 Room / Location: [facility] ENDOSCOPY 1 / [facility] ENDOSCOPY    Anesthesia Start: 1039 Anesthesia Stop: 1047    Procedure: ESOPHAGOGASTRODUODENOSCOPY possible dilation Spring break week (N/A ) Diagnosis:       Peptic ulcer disease      (Peptic ulcer disease [K27.9])    Surgeons: Joaquim Santillan MD Provider: Calli Villegas CRNA    Anesthesia Type: MAC ASA Status: 3          Anesthesia Type: MAC    Vitals  No vitals data found for the desired time range.          Post Anesthesia Care and Evaluation    Patient location during evaluation: bedside  Patient participation: complete - patient participated  Level of consciousness: sleepy but conscious  Pain score: 0  Pain management: adequate  Airway patency: patent  Anesthetic complications: No anesthetic complications  PONV Status: none  Cardiovascular status: acceptable  Respiratory status: acceptable  Hydration status: acceptable    Comments: ---------------------------               04/06/21                      1028         ---------------------------   BP:        (!) 129/102      Pulse:         115          Resp:          18           Temp:   97.4 °F (36.3 °C)   SpO2:          97%         ---------------------------

## 2021-04-13 RX ORDER — ONDANSETRON 4 MG/1
4 TABLET, ORALLY DISINTEGRATING ORAL EVERY 8 HOURS PRN
Qty: 20 TABLET | Refills: 2 | Status: SHIPPED | OUTPATIENT
Start: 2021-04-13 | End: 2021-05-05

## 2021-04-20 ENCOUNTER — TELEMEDICINE (OUTPATIENT)
Dept: GASTROENTEROLOGY | Facility: CLINIC | Age: 46
End: 2021-04-20

## 2021-04-20 DIAGNOSIS — K21.9 GASTROESOPHAGEAL REFLUX DISEASE, UNSPECIFIED WHETHER ESOPHAGITIS PRESENT: ICD-10-CM

## 2021-04-20 DIAGNOSIS — K31.84 GASTROPARESIS: Primary | ICD-10-CM

## 2021-04-20 DIAGNOSIS — K27.9 PEPTIC ULCER DISEASE: ICD-10-CM

## 2021-04-20 DIAGNOSIS — K59.04 CHRONIC IDIOPATHIC CONSTIPATION: ICD-10-CM

## 2021-04-20 PROCEDURE — 99422 OL DIG E/M SVC 11-20 MIN: CPT | Performed by: NURSE PRACTITIONER

## 2021-04-20 NOTE — PROGRESS NOTES
No chief complaint on file.      Subjective    Jeanie Jones is a 45 y.o. female. she is here today for follow-up.  45-year-old female presents for recheck after EGD and colonoscopy.  Reports she did not eat greater than 24 hours prior to procedure she has history of gastroparesis though had not had any flare of symptoms in several years.  In the past she has tried Reglan but was not able to tolerate due to side effects.  She has history of perforated peptic ulcer surgical repair per Dr. Goldberg 10/20.  EGD noted normal duodenum normal esophagus and a medium out of residue was found in the entire examined stomach.    Constipation  This is a chronic problem. The current episode started more than 1 year ago. The problem has been waxing and waning since onset. Her stool frequency is 1 time per week or less. The stool is described as formed (very dark ). The patient is not on a high fiber diet. She does not exercise regularly. There has not been adequate water intake. Associated symptoms include abdominal pain, bloating and nausea. Pertinent negatives include no anorexia, back pain, diarrhea, difficulty urinating, fever, rectal pain or vomiting.            The following portions of the patient's history were reviewed and updated as appropriate:   Past Medical History:   Diagnosis Date   • Anemia    • Anxiety    • Cushing disease (CMS/HCC)    • Depression    • Diabetes mellitus (CMS/HCC)    • GERD (gastroesophageal reflux disease)     OCCASIONALLY   • Hypertension    • Kidney stones    • Low back pain    • MRSA infection    • Multiple joint pain    • Other long term (current) drug therapy    • Pancreatitis      Past Surgical History:   Procedure Laterality Date   • ADENOIDECTOMY     • APPENDECTOMY     •  SECTION     • CHOLECYSTECTOMY     • CYSTOSCOPY, URETEROSCOPY, RETROGRADE PYELOGRAM, STENT INSERTION Right 2020    Procedure: CYSTOSCOPY, RIGHT RETROGRAE, URETEROSCOPY, LASER LITHOTRIPSY, STENT  PLACEMENT  LATEX ALLERGY;  Surgeon: Bakari Starr MD;  Location: [facility] OR;  Service: Urology;  Laterality: Right;   • ENDOSCOPY N/A 2021    Procedure: ESOPHAGOGASTRODUODENOSCOPY possible dilation Spring break week;  Surgeon: Joaquim Santillan MD;  Location: [facility] ENDOSCOPY;  Service: Gastroenterology;  Laterality: N/A;   • EXPLORATORY LAPAROTOMY N/A 10/24/2020    Procedure: LAPAROTOMY EXPLORATORY AND CLOSURE OF GASTRIC ULCER,;  Surgeon: Valeriano Goldberg MD;  Location: [facility] OR;  Service: General;  Laterality: N/A;   • EXPLORATORY LAPAROTOMY     • FRACTURE SURGERY Left     LLE with plates and screws   • HYSTERECTOMY     • INJECTION OF MEDICATION  2015    Toradol (1)      • TONSILLECTOMY       Family History   Problem Relation Age of Onset   • Rheum arthritis Mother    • Cancer Mother         lung cancer   • Heart disease Mother    • Cancer Father    • No Known Problems Sister    • No Known Problems Brother    • Diabetes Maternal Grandmother    • No Known Problems Maternal Grandfather    • No Known Problems Paternal Grandmother    • No Known Problems Paternal Grandfather    • No Known Problems Sister    • Cerebral palsy Child      OB History        1    Para   0    Term   0       0    AB   0    Living   1       SAB   0    TAB   0    Ectopic   0    Molar        Multiple   0    Live Births                  Prior to Admission medications    Medication Sig Start Date End Date Taking? Authorizing Provider   Buprenorphine ER (Sublocade) 300 MG/1.5ML solution prefilled syringe Inject  under the skin into the appropriate area as directed.    Provider, MD Renae   Continuous Blood Gluc  (FreeStyle Tracee 2 [place] Systm) device 1 each Continuous. Use as indicated for glucose monitoring 20   Dano Wong APRN   Continuous Blood Gluc Sensor (FreeStyle Tracee 2 Sensor Systm) misc 1 each Every 14 (Fourteen) Days. Use every 14 days 20   Dano Wong APRN    dilTIAZem (CARDIZEM) 30 MG tablet Take 1 tablet by mouth 3 (Three) Times a Day. 10/28/20   Valeriano Goldberg MD   docusate sodium (COLACE) 100 MG capsule Take 1 capsule by mouth 2 (Two) Times a Day As Needed for Constipation. 4/2/21   Darnell Reyes MD   DULoxetine (CYMBALTA) 60 MG capsule Take 60 mg by mouth Daily.    Renae Perla MD   escitalopram (LEXAPRO) 10 MG tablet Take 10 mg by mouth Daily.    Renae Perla MD   famotidine (PEPCID) 40 MG tablet Take 40 mg by mouth Daily.    Renae Perla MD   ferrous sulfate 325 (65 FE) MG tablet Take 1 tablet by mouth Daily With Breakfast. 4/2/21   Darnell Reyes MD   fluconazole (DIFLUCAN) 150 MG tablet Take 1 tablet day of infection and 1 tablet 3 days later 8/18/20   Raza Verduzco MD   folic acid (FOLVITE) 1 MG tablet Take 1 tablet by mouth Daily. 4/2/21   Darnell Reyes MD   glucagon (Glucagon Emergency) 1 MG injection Inject 1 mg under the skin into the appropriate area as directed 1 (One) Time As Needed for Low Blood Sugar for up to 1 dose. 8/18/20   Raza Verduzco MD   hydrOXYzine (ATARAX) 50 MG tablet Take 50 mg by mouth As Needed.    Renae Perla MD   Insulin Lispro, 1 Unit Dial, (HumaLOG KwikPen) 100 UNIT/ML solution pen-injector Use up to 20 units 3 times a day with meals 8/20/20   Raza Verduzco MD   Insulin Pen Needle (PEN NEEDLES) 32G X 4 MM misc 1 each 4 (Four) Times a Day. Use 4 x daily, Dx code E11.65 8/18/20   Raza Verduzco MD   Invokana 100 MG tablet tablet TAKE 1 TABLET BY MOUTH EVERY DAY  Patient taking differently: Take 100 mg by mouth Daily. 3/31/21   Dano Wong APRN   Lantus SoloStar 100 UNIT/ML injection pen Inject 50 Units under the skin into the appropriate area as directed Every Night. 10/19/20   Renae Perla MD   lisinopril (PRINIVIL,ZESTRIL) 2.5 MG tablet Take 2.5 mg by mouth Daily.    Provider, MD Renae   ondansetron ODT (ZOFRAN-ODT) 4 MG  disintegrating tablet PLACE 1 TABLET ON THE TONGUE EVERY 8 (EIGHT) HOURS AS NEEDED FOR NAUSEA OR VOMITING. 4/13/21   Lizett Vides APRN   pantoprazole (PROTONIX) 40 MG EC tablet Take 40 mg by mouth Daily. 11/13/20   Renae Perla MD   potassium chloride (K-DUR,KLOR-CON) 20 MEQ CR tablet Take 20 mEq by mouth 2 (Two) Times a Day. 3/19/21   Renae Perla MD   predniSONE (DELTASONE) 5 MG tablet Take 5 mg by mouth 2 (Two) Times a Day.    Renae Perla MD   rosuvastatin (CRESTOR) 20 MG tablet Take 20 mg by mouth Daily. 11/9/20   Renae Perla MD   Semaglutide,0.25 or 0.5MG/DOS, (Ozempic, 0.25 or 0.5 MG/DOSE,) 2 MG/1.5ML solution pen-injector Inject 0.5 mg under the skin into the appropriate area as directed 1 (One) Time Per Week. 2/24/21   Dano Wong APRN   tiZANidine (ZANAFLEX) 4 MG tablet Take 4 mg by mouth Every 8 (Eight) Hours As Needed for Muscle Spasms.    Renae Perla MD   vitamin B-12 (CYANOCOBALAMIN) 1000 MCG tablet Take 1 tablet by mouth Daily. 4/2/21   Darnell Reyes MD     Allergies   Allergen Reactions   • Adhesive Tape Unknown - Low Severity     Tares skin    • Bactrim [Sulfamethoxazole-Trimethoprim] Hives   • Ciprofloxacin Hives   • Latex Rash     Only when pt wears latex gloves      Social History     Socioeconomic History   • Marital status: Single     Spouse name: Not on file   • Number of children: 1   • Years of education: Not on file   • Highest education level: Not on file   Tobacco Use   • Smoking status: Current Every Day Smoker     Packs/day: 1.00     Years: 20.00     Pack years: 20.00     Types: Cigarettes   • Smokeless tobacco: Never Used   Vaping Use   • Vaping Use: Some days   • Substances: Nicotine, Flavoring   • Devices: Disposable   Substance and Sexual Activity   • Alcohol use: No   • Drug use: No   • Sexual activity: Defer       Review of Systems  Review of Systems   Constitutional: Positive for appetite change and fatigue. Negative  for chills, diaphoresis, fever and unexpected weight change.   HENT: Negative for trouble swallowing.    Gastrointestinal: Positive for abdominal pain, bloating, constipation and nausea. Negative for abdominal distention, anal bleeding, anorexia, blood in stool, diarrhea, rectal pain and vomiting.   Genitourinary: Negative for difficulty urinating.   Musculoskeletal: Negative for back pain.        There were no vitals taken for this visit.    Objective    Physical Exam  Constitutional:       General: She is not in acute distress.     Appearance: She is normal weight.   Pulmonary:      Effort: Pulmonary effort is normal.   Neurological:      Mental Status: She is alert.     Exam limited due to video visit.  Lab on 04/03/2021   Component Date Value Ref Range Status   • COVID19 04/03/2021 Not Detected  Not Detected - Ref. Range Final     Assessment/Plan      1. Gastroparesis    2. Gastroesophageal reflux disease, unspecified whether esophagitis present    3. Peptic ulcer disease    4. Chronic idiopathic constipation    .   Discussed importance of small frequent meals and dietary modification for gastroparesis.  Continue Protonix daily.  Will need to repeat EGD will plan at next follow-up.  Start patient on Linzess 145 daily due to chronic constipation.  We will plan colonoscopy at follow-up.    Orders placed during this encounter include:  No orders of the defined types were placed in this encounter.    This was an audio and video enabled telemedicine encounter.  * Surgery not found *    Review and/or summary of lab tests, radiology, procedures, medications. Review and summary of old records and obtaining of history. The risks and benefits of my recommendations, as well as other treatment options were discussed with the patient today. Questions were answered.    New Medications Ordered This Visit   Medications   • linaclotide (Linzess) 145 MCG capsule capsule     Sig: Take 1 capsule by mouth Every Morning Before  Breakfast.     Dispense:  30 capsule     Refill:  5       Follow-up: Return in about 4 weeks (around 5/18/2021) for Recheck, After test.          This document has been electronically signed by LEILANI Rouse on April 20, 2021 10:38 CDT           I spent 22 minutes caring for Jeanie on this date of service. This time includes time spent by me in the following activities:preparing for the visit, reviewing tests, obtaining and/or reviewing a separately obtained history, performing a medically appropriate examination and/or evaluation , counseling and educating the patient/family/caregiver, ordering medications, tests, or procedures, referring and communicating with other health care professionals , documenting information in the medical record and care coordination    Results for orders placed or performed in visit on 04/03/21   COVID-19, [facility] IN-HOUSE, NP SWAB IN TRANSPORT MEDIA 8-10 HR TAT - Swab, Nasopharynx    Specimen: Nasopharynx; Swab   Result Value Ref Range    COVID19 Not Detected Not Detected - Ref. Range   Results for orders placed or performed in visit on 04/01/21   CBC Auto Differential    Specimen: Arm, Right; Blood   Result Value Ref Range    WBC 19.49 (H) 3.40 - 10.80 10*3/mm3    RBC 4.83 3.77 - 5.28 10*6/mm3    Hemoglobin 12.7 12.0 - 15.9 g/dL    Hematocrit 40.6 34.0 - 46.6 %    MCV 84.1 79.0 - 97.0 fL    MCH 26.3 (L) 26.6 - 33.0 pg    MCHC 31.3 (L) 31.5 - 35.7 g/dL    RDW 18.4 (H) 12.3 - 15.4 %    RDW-SD 55.4 (H) 37.0 - 54.0 fl    MPV 8.5 6.0 - 12.0 fL    Platelets 364 140 - 450 10*3/mm3    Neutrophil % 80.8 (H) 42.7 - 76.0 %    Lymphocyte % 11.3 (L) 19.6 - 45.3 %    Monocyte % 6.0 5.0 - 12.0 %    Eosinophil % 0.8 0.3 - 6.2 %    Basophil % 0.5 0.0 - 1.5 %    Immature Grans % 0.6 (H) 0.0 - 0.5 %    Neutrophils, Absolute 15.75 (H) 1.70 - 7.00 10*3/mm3    Lymphocytes, Absolute 2.21 0.70 - 3.10 10*3/mm3    Monocytes, Absolute 1.16 (H) 0.10 - 0.90 10*3/mm3    Eosinophils, Absolute 0.15 0.00 - 0.40  10*3/mm3    Basophils, Absolute 0.10 0.00 - 0.20 10*3/mm3    Immature Grans, Absolute 0.12 (H) 0.00 - 0.05 10*3/mm3    nRBC 0.0 0.0 - 0.2 /100 WBC   Iron and TIBC    Specimen: Arm, Right; Blood   Result Value Ref Range    Iron 21 (L) 37 - 145 mcg/dL    Iron Saturation 4 (L) 20 - 50 %    Transferrin 369 (H) 200 - 360 mg/dL    TIBC 550 (H) 298 - 536 mcg/dL   Protime-INR    Specimen: Arm, Right; Blood   Result Value Ref Range    Protime 11.9 11.1 - 15.3 Seconds    INR 0.85 0.80 - 1.20   Lactate Dehydrogenase    Specimen: Arm, Right; Blood   Result Value Ref Range     135 - 214 U/L   Folate    Specimen: Arm, Right; Blood   Result Value Ref Range    Folate 7.93 4.78 - 24.20 ng/mL   Ferritin    Specimen: Arm, Right; Blood   Result Value Ref Range    Ferritin 9.38 (L) 13.00 - 150.00 ng/mL   Results for orders placed or performed during the hospital encounter of 10/24/20   Gold Top - SST   Result Value Ref Range    Extra Tube Hold for add-ons.    Gold Top - SST   Result Value Ref Range    Extra Tube Hold for add-ons.    Green Top (Gel)   Result Value Ref Range    Extra Tube Hold for add-ons.    Blood Gas, Arterial With Co-Ox    Specimen: Arterial Blood   Result Value Ref Range    Site Arterial Line     Elia's Test N/A     pH, Arterial 7.403 7.350 - 7.450 pH units    pCO2, Arterial 34.3 (L) 35.0 - 45.0 mm Hg    pO2, Arterial 80.5 (L) 83.0 - 108.0 mm Hg    HCO3, Arterial 21.4 20.0 - 26.0 mmol/L    Base Excess, Arterial -2.8 (L) 0.0 - 2.0 mmol/L    O2 Saturation, Arterial 96.7 94.0 - 99.0 %    Hemoglobin, Blood Gas 11.6 (L) 13.5 - 17.5 g/dL    Hematocrit, Blood Gas 35.6 (L) 38.0 - 51.0 %    Oxyhemoglobin 93.8 (L) 94 - 99 %    Methemoglobin 0.30 0.00 - 3.00 %    Carboxyhemoglobin 2.7 0 - 5 %    A-a Gradiant 28.1 mmHg    Sodium, Arterial 137 136 - 146 mmol/L    Potassium, Arterial 3.9 3.4 - 4.5 mmol/L    Ionized Calcium 4.75 4.60 - 5.60 mg/dL    Glucose, Arterial 137 (H) 65 - 95 mmol/L    Barometric Pressure for Blood  Gas 749 mmHg    Modality N/A     Ventilator Mode NA     Note      Collected by RT     pH, Temp Corrected      pCO2, Temperature Corrected      pO2, Temperature Corrected     Blood Gas, Arterial With Co-Ox    Specimen: Arterial Blood   Result Value Ref Range    Site Arterial Line     Elia's Test N/A     pH, Arterial 7.379 7.350 - 7.450 pH units    pCO2, Arterial 28.3 (L) 35.0 - 45.0 mm Hg    pO2, Arterial 124.0 (H) 83.0 - 108.0 mm Hg    HCO3, Arterial 16.7 (L) 20.0 - 26.0 mmol/L    Base Excess, Arterial -7.2 (L) 0.0 - 2.0 mmol/L    O2 Saturation, Arterial 99.0 94.0 - 99.0 %    Hemoglobin, Blood Gas 11.1 (L) 13.5 - 17.5 g/dL    Hematocrit, Blood Gas 34.1 (L) 38.0 - 51.0 %    Oxyhemoglobin 95.6 94 - 99 %    Methemoglobin 0.50 0.00 - 3.00 %    Carboxyhemoglobin 3.0 0 - 5 %    A-a Gradiant      Sodium, Arterial 140 136 - 146 mmol/L    Potassium, Arterial 2.9 (L) 3.4 - 4.5 mmol/L    Ionized Calcium 3.71 (L) 4.60 - 5.60 mg/dL    Glucose, Arterial 120 (H) 65 - 95 mmol/L    Barometric Pressure for Blood Gas 749 mmHg    Modality N/A     Ventilator Mode NA     Note      Collected by RT     pH, Temp Corrected      pCO2, Temperature Corrected      pO2, Temperature Corrected     COVID-19, [facility] IN-HOUSE, NP SWAB IN TRANSPORT MEDIA 8-10 HR TAT - Swab, Nasopharynx    Specimen: Nasopharynx; Swab   Result Value Ref Range    COVID19 Not Detected Not Detected - Ref. Range   Urinalysis With Microscopic If Indicated (No Culture) - Urine, Catheter    Specimen: Urine, Catheter   Result Value Ref Range    Color, UA Yellow Yellow, Straw, Dark Yellow, Maureen    Appearance, UA Clear Clear    pH, UA 6.5 5.0 - 9.0    Specific Gravity, UA 1.067 (H) 1.003 - 1.030    Glucose, UA >=1000 mg/dL (3+) (A) Negative    Ketones, UA 15 mg/dL (1+) (A) Negative    Bilirubin, UA Negative Negative    Blood, UA Negative Negative    Protein, UA Negative Negative    Leuk Esterase, UA Negative Negative    Nitrite, UA Negative Negative    Urobilinogen, UA 1.0  E.U./dL 0.2 - 1.0 E.U./dL     *Note: Due to a large number of results and/or encounters for the requested time period, some results have not been displayed. A complete set of results can be found in Results Review.

## 2021-04-22 LAB
APTT PPP: 22.2 SEC
FACT VIII ACT/NOR PPP: 68 %
VWF AG ACT/NOR PPP IA: 171 %
VWF CBA ACT/NOR PPP IA: 178 %
VWF CBA/VWF AG PPP IA-RTO: 1 RATIO
VWF:RCO ACT/NOR PPP PL AGG: 150 %

## 2021-04-30 ENCOUNTER — APPOINTMENT (OUTPATIENT)
Dept: ONCOLOGY | Facility: HOSPITAL | Age: 46
End: 2021-04-30

## 2021-05-05 RX ORDER — ONDANSETRON 4 MG/1
4 TABLET, ORALLY DISINTEGRATING ORAL EVERY 8 HOURS PRN
Qty: 20 TABLET | Refills: 2 | Status: SHIPPED | OUTPATIENT
Start: 2021-05-05 | End: 2021-06-07

## 2021-05-05 NOTE — PROGRESS NOTES
"Chief Complaint   Patient presents with   • bladder issues     recurrent issues with bladder no infection at this time, just changes in color.   • Medication Problem     pt states \"metformin makes sugar higher so she needs something else\"       Subjective   Jeanie Jones is a 42 y.o. female presents to the office for evaluation of hematuria.    PMH  T2DM- recently diagnosed, treated with metformin    Chronic nausea- secondary to metformin    GERD:  Managed with Pepcid    Anxiety:  Managed with atarax- well controlled. Takes PRN and sometimes daily    Suboxone clinic: patient goes to suboxone to prevent relapsing of opioid addiction    HPI   Patient presents for re-evaluation of gross hematuria. She reports dark colored urine that began approximately 4 days prior. She denies fever, back pain, dysuria, urinary frequency, and urgency.  She has been increasing her PO water intake with only mild relief of symptoms.   She has an appt with her urologist next week, Dr. Starr    She has a history of totally hysterectomy and bilateral oophorectomy. No hormone replacement      Blood in Urine   This is a recurrent problem. The current episode started in the past 7 days. The problem has been waxing and waning since onset. She describes the hematuria as gross hematuria. Her pain is at a severity of 0/10. She is experiencing no pain. She describes her urine color as tea colored. Irritative symptoms do not include frequency, nocturia or urgency. Obstructive symptoms do not include dribbling, incomplete emptying, an intermittent stream, a slower stream, straining or a weak stream. Pertinent negatives include no abdominal pain, chills, dysuria, fever, flank pain, inability to urinate, nausea or vomiting. She is not sexually active. Her past medical history is significant for tobacco use. There is no history of recent infection.     Family History   Problem Relation Age of Onset   • Rheum arthritis Mother    • Cancer Mother  "     lung cancer   • Heart disease Mother      Social History     Social History   • Marital status: Single     Spouse name: N/A   • Number of children: 1   • Years of education: N/A     Occupational History   • Not on file.     Social History Main Topics   • Smoking status: Current Some Day Smoker     Packs/day: 1.00     Types: Electronic Cigarette, Cigarettes   • Smokeless tobacco: Never Used      Comment: Does smoke occasionally uses ecig.    • Alcohol use No   • Drug use: No   • Sexual activity: No     Other Topics Concern   • Not on file     Social History Narrative   • No narrative on file       The following portions of the patient's history were reviewed and updated as appropriate: allergies, current medications, past family history, past medical history, past social history, past surgical history and problem list.    Review of Systems   Constitutional: Negative.  Negative for activity change, chills, fatigue, fever and unexpected weight change.   HENT: Negative.  Negative for congestion, ear pain, postnasal drip, rhinorrhea, sinus pressure and sore throat.    Eyes: Negative.  Negative for pain and redness.   Respiratory: Negative.  Negative for cough, choking, chest tightness, shortness of breath and wheezing.    Cardiovascular: Negative.  Negative for chest pain, palpitations and leg swelling.   Gastrointestinal: Negative.  Negative for abdominal distention, abdominal pain, constipation, diarrhea, nausea, rectal pain and vomiting.   Endocrine: Negative.    Genitourinary: Positive for hematuria. Negative for decreased urine volume, difficulty urinating, dysuria, flank pain, frequency, incomplete emptying, nocturia and urgency.   Musculoskeletal: Negative.  Negative for gait problem and neck pain.   Skin: Negative.  Negative for rash and wound.   Allergic/Immunologic: Negative.    Neurological: Negative.  Negative for dizziness, syncope, weakness, light-headedness, numbness and headaches.   Hematological:  "Negative.    Psychiatric/Behavioral: Negative.  Negative for agitation, behavioral problems, confusion, self-injury, sleep disturbance and suicidal ideas. The patient is not nervous/anxious.      14 Point ROS completed with pertinent positives discussed. All other systems reviewed and are negative.       Objective   Encounter Vitals  /82   Pulse 80   Temp 98.2 °F (36.8 °C) (Tympanic)   Resp 20   Ht 167.6 cm (66\")   Wt 81.2 kg (179 lb)   SpO2 95%   BMI 28.89 kg/m²   Vitals:    04/02/18 1410   BP: 118/82   Pulse: 80   Resp: 20   Temp: 98.2 °F (36.8 °C)   TempSrc: Tympanic   SpO2: 95%   Weight: 81.2 kg (179 lb)   Height: 167.6 cm (66\")       Physical Exam   Constitutional: She is oriented to person, place, and time. Vital signs are normal. She appears well-developed and well-nourished.   HENT:   Head: Normocephalic and atraumatic.   Right Ear: Tympanic membrane, external ear and ear canal normal.   Left Ear: Tympanic membrane, external ear and ear canal normal.   Nose: Nose normal.   Mouth/Throat: Uvula is midline, oropharynx is clear and moist and mucous membranes are normal. No posterior oropharyngeal edema or posterior oropharyngeal erythema.   Eyes: Lids are normal. Pupils are equal, round, and reactive to light.   Neck: Trachea normal and normal range of motion. Neck supple. No thyroid mass present.   Cardiovascular: Normal rate, regular rhythm, S1 normal, S2 normal, normal heart sounds and intact distal pulses.  Exam reveals no gallop and no friction rub.    No murmur heard.  Pulmonary/Chest: Effort normal and breath sounds normal. No respiratory distress. She has no wheezes. She has no rales.   Abdominal: Soft. Normal appearance and bowel sounds are normal. She exhibits no mass. There is no tenderness. There is no rebound, no guarding and no CVA tenderness.   Musculoskeletal: Normal range of motion.   Lymphadenopathy:     She has no cervical adenopathy.   Neurological: She is alert and oriented to " person, place, and time. She has normal strength. No cranial nerve deficit or sensory deficit. Gait normal.   Skin: Skin is warm and dry. No rash noted.   Psychiatric: She has a normal mood and affect. Her speech is normal and behavior is normal. Judgment and thought content normal. Cognition and memory are normal.   Nursing note and vitals reviewed.      Pertinent Labs  Office Visit on 04/02/2018   Component Date Value Ref Range Status   • Color 04/02/2018 Dark Yellow  Yellow, Straw, Dark Yellow, Maureen Final   • Clarity, UA 04/02/2018 Clear  Clear Final   • Glucose, UA 04/02/2018 Negative  Negative, 1000 mg/dL (3+) mg/dL Final   • Bilirubin 04/02/2018 Small (1+)* Negative Final   • Ketones, UA 04/02/2018 Negative  Negative Final   • Specific Gravity  04/02/2018 1.015  1.005 - 1.030 Final   • Blood, UA 04/02/2018 2+* Negative Final   • pH, Urine 04/02/2018 5.5  5.0 - 8.0 Final   • Protein, POC 04/02/2018 1+* Negative mg/dL Final   • Urobilinogen, UA 04/02/2018 Normal  Normal Final   • Leukocytes 04/02/2018 Trace* Negative Final   • Nitrite, UA 04/02/2018 Negative  Negative Final     Labs have been independently reviewed    Key Imaging/Tracings/POC Testing    Assessment and Medications  Problems Addressed this Visit        Endocrine    Type 2 diabetes mellitus without complication, without long-term current use of insulin    Relevant Orders    Comprehensive Metabolic Panel    CBC & Differential    Hemoglobin A1c    Lipid Panel    TSH    T4, Free    Vitamin D 25 Hydroxy    Vitamin B12    Insulin, Total      Other Visit Diagnoses     Gross hematuria    -  Primary    Relevant Orders    Urinalysis With Microscopic - Urine, Clean Catch    Urine Culture - Urine, Urine, Clean Catch    POC Urinalysis Dipstick, Automated (Completed)    Vitamin D deficiency         Relevant Orders    Vitamin D 25 Hydroxy    Counseling for hormone replacement therapy        Relevant Orders    Ambulatory Referral to Gynecology        Side  effects of ordered medications discussed with patient.     Plan/Additional Notes/Instructions  Plan   1. Patient to complete fasting labs tomorrow  2. Patient to complete u/a with micro and culture tomorrow   3. Patient to call and see if she can be seen by Dr. Starr this week given she has symptoms in absence of ABX treatment  4. Refer to DELORES Davalos to discuss HRT  5. F/u after referral, after urology appt, and prn if needed  6. F/u sooner if fever develops, or if symptoms worsen    Follow-Up  Return if symptoms worsen or fail to improve.    Patient/caregiver verbalizes understanding of all orders and instructions in this plan of care.           This document has been electronically signed by LEILANI Gallagher on April 3, 2018 8:32 AM             none

## 2021-05-10 ENCOUNTER — APPOINTMENT (OUTPATIENT)
Dept: ONCOLOGY | Facility: CLINIC | Age: 46
End: 2021-05-10

## 2021-06-07 RX ORDER — ONDANSETRON 4 MG/1
4 TABLET, ORALLY DISINTEGRATING ORAL EVERY 8 HOURS PRN
Qty: 20 TABLET | Refills: 2 | Status: SHIPPED | OUTPATIENT
Start: 2021-06-07 | End: 2021-09-01 | Stop reason: SDUPTHER

## 2021-07-29 ENCOUNTER — DOCUMENTATION (OUTPATIENT)
Dept: ENDOCRINOLOGY | Facility: CLINIC | Age: 46
End: 2021-07-29

## 2021-08-11 ENCOUNTER — TELEMEDICINE (OUTPATIENT)
Dept: ENDOCRINOLOGY | Facility: CLINIC | Age: 46
End: 2021-08-11

## 2021-08-11 DIAGNOSIS — E03.9 ACQUIRED HYPOTHYROIDISM: ICD-10-CM

## 2021-08-11 DIAGNOSIS — E11.65 TYPE 2 DIABETES MELLITUS WITH HYPERGLYCEMIA, WITH LONG-TERM CURRENT USE OF INSULIN (HCC): ICD-10-CM

## 2021-08-11 DIAGNOSIS — E55.9 VITAMIN D DEFICIENCY: Primary | ICD-10-CM

## 2021-08-11 DIAGNOSIS — Z79.4 TYPE 2 DIABETES MELLITUS WITH HYPERGLYCEMIA, WITH LONG-TERM CURRENT USE OF INSULIN (HCC): ICD-10-CM

## 2021-08-11 PROCEDURE — 99214 OFFICE O/P EST MOD 30 MIN: CPT | Performed by: NURSE PRACTITIONER

## 2021-08-11 NOTE — PROGRESS NOTES
Chief Complaint  Diabetes    Subjective          Jeanie Anjelica Jones presents to [facility] ENDOCRINOLOGY  History of Present Illness       You have chosen to receive care through a telehealth visit.  Do you consent to use a video/audio connection for your medical care today? Yes          TELEHEALTH VIDEO VISIT     This a video visit due to [facility] current guidelines for social distancing due to the COVID 19 pandemic          Primary provider LEILANI Gallagher       46 year old female presents for follow up     Reason diabetes secondary to steroid use     Timing constant        Lab Results   Component Value Date    HGBA1C 6.5 (H) 07/21/2021       Duration diagnosed in 2019    Severity moderate     Alleviating factors compliance     Aggravating factors --steroid use        Blood glucose readings      She checks 4 times daily and has been for over 90 days       Tracee personal use --states bg are at goal          ---------------------------------------------------------------------------        RASH      Has been treated with prednisone for about 5 years      Rash is on the arms and legs     It is widespread, she does have itching      alleviating factors -- steroids and atarax      Modifying factors include the use of steroids and Atarax                        Review of Systems - General ROS: negative        Objective   Vital Signs:   There were no vitals taken for this visit.    Physical Exam  Neurological:      General: No focal deficit present.      Mental Status: She is alert.   Psychiatric:         Mood and Affect: Mood normal.         Thought Content: Thought content normal.         Judgment: Judgment normal.        Result Review :   The following data was reviewed by: LEILANI Aguilar on 08/11/2021:  Common labs    Common Labsle 5/12/21 5/18/21 7/21/21   Hemoglobin A1C 6.6 (A) 6.5 (A) 6.5 (A)   (A) Abnormal value                      Assessment and Plan    There are no  diagnoses linked to this encounter.       Rash ---            Using atarax 50 mg tid --continue       Rash does not appear to be endocrine related      Component      Latest Ref Rng & Units 8/18/2020 8/20/2020   Creatinine, 24H       0.70 - 1.60 g/24 hr   0.66 (L)   Creatinine, Urine      mg/dL   36.9   Urine Volume      mL   1,800   Time (Hours)      hrs   24   5-HIAA, Urine      Undefined mg/L   3.0   5-HIAA, 24H Ur      0.0 - 14.9 mg/24 hr   5.4   Calcitonin      0.0 - 5.0 pg/mL <2.0     Insulin-Like Growth Factor-1      74 - 239 ng/mL 94     MANUEL Direct      Negative Negative     Uric Acid      2.4 - 5.7 mg/dL 2.6     C-Reactive Protein      0.00 - 0.50 mg/dL 0.36     Sed Rate      0 - 20 mm/hr 2     C-Peptide      1.1 - 4.4 ng/mL 5.4 (H)                       -----------        Cushing's medication induced / adrenal insufficiency            Prednisone            Now on 5 mg tablets       taking one am and one pm      If fever , nausea or vomiting , take 100 mg IM and head to ER                    ------------     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    Lab Results   Component Value Date    HGBA1C 6.5 (H) 07/21/2021                Taking invokana 100 mg daily         Lantus taking take 10 units      If am sugars are less than 80 decrease by 5 units            Humalog      Taking 4 units before each meals      Plus         151-200: 2units  201-250: 4 units  251-300 : 6units  Above 300 : 8 units           The goal is to have a sugar 2 hours after eating less than 180            Taking Ozempic 0.5 mg once weekly            No changes above -- bg is at goal      She now has a [employer] personal system     She uses the [employer] data to adjust her insulin dosages                              Follow Up   No follow-ups on file.  Patient was given instructions and counseling regarding her condition or for health maintenance advice. Please see specific information pulled into the AVS if appropriate.         This document has been  electronically signed by LEILANI Aguilar on August 11, 2021 15:12 CDT.

## 2021-08-23 RX ORDER — INSULIN LISPRO 100 [IU]/ML
INJECTION, SOLUTION INTRAVENOUS; SUBCUTANEOUS
Qty: 15 ML | Refills: 11 | Status: SHIPPED | OUTPATIENT
Start: 2021-08-23 | End: 2022-09-07

## 2021-09-01 ENCOUNTER — OFFICE VISIT (OUTPATIENT)
Dept: GASTROENTEROLOGY | Facility: CLINIC | Age: 46
End: 2021-09-01

## 2021-09-01 VITALS
BODY MASS INDEX: 22.24 KG/M2 | SYSTOLIC BLOOD PRESSURE: 129 MMHG | WEIGHT: 138.4 LBS | DIASTOLIC BLOOD PRESSURE: 81 MMHG | HEART RATE: 113 BPM | HEIGHT: 66 IN

## 2021-09-01 DIAGNOSIS — R11.0 NAUSEA: ICD-10-CM

## 2021-09-01 DIAGNOSIS — K21.00 GASTROESOPHAGEAL REFLUX DISEASE WITH ESOPHAGITIS WITHOUT HEMORRHAGE: Primary | ICD-10-CM

## 2021-09-01 DIAGNOSIS — K27.9 PEPTIC ULCER DISEASE: ICD-10-CM

## 2021-09-01 PROBLEM — F41.9 ANXIETY AND DEPRESSION: Status: ACTIVE | Noted: 2018-07-10

## 2021-09-01 PROBLEM — N20.1 CALCULUS OF URETER: Status: ACTIVE | Noted: 2021-05-12

## 2021-09-01 PROBLEM — E11.9 CONTROLLED TYPE 2 DIABETES MELLITUS WITHOUT COMPLICATION, WITH LONG-TERM CURRENT USE OF INSULIN (HCC): Status: ACTIVE | Noted: 2018-07-10

## 2021-09-01 PROBLEM — F32.A ANXIETY AND DEPRESSION: Status: ACTIVE | Noted: 2018-07-10

## 2021-09-01 PROBLEM — K56.609 SMALL BOWEL OBSTRUCTION (HCC): Status: ACTIVE | Noted: 2021-05-12

## 2021-09-01 PROBLEM — Z79.4 CONTROLLED TYPE 2 DIABETES MELLITUS WITHOUT COMPLICATION, WITH LONG-TERM CURRENT USE OF INSULIN: Status: ACTIVE | Noted: 2018-07-10

## 2021-09-01 PROBLEM — R10.9 ABDOMINAL PAIN: Status: ACTIVE | Noted: 2021-05-17

## 2021-09-01 PROBLEM — R31.9 HEMATURIA: Status: ACTIVE | Noted: 2021-05-12

## 2021-09-01 PROCEDURE — 99213 OFFICE O/P EST LOW 20 MIN: CPT | Performed by: NURSE PRACTITIONER

## 2021-09-01 RX ORDER — ONDANSETRON 4 MG/1
4 TABLET, ORALLY DISINTEGRATING ORAL EVERY 8 HOURS PRN
Qty: 20 TABLET | Refills: 2 | Status: SHIPPED | OUTPATIENT
Start: 2021-09-01 | End: 2021-09-28

## 2021-09-01 RX ORDER — BUPRENORPHINE HYDROCHLORIDE AND NALOXONE HYDROCHLORIDE DIHYDRATE 8; 2 MG/1; MG/1
TABLET SUBLINGUAL
COMMUNITY
Start: 2021-07-15

## 2021-09-01 RX ORDER — MAGNESIUM OXIDE 400 MG/1
200 TABLET ORAL DAILY
COMMUNITY
End: 2022-09-12

## 2021-09-01 RX ORDER — BREXPIPRAZOLE 1 MG/1
1 TABLET ORAL DAILY
COMMUNITY

## 2021-09-01 NOTE — PROGRESS NOTES
Chief Complaint   Patient presents with   • Peptic Ulcer Disease   • gastroparesis       Subjective    Jeanie Jones is a 46 y.o. female. she is here today for follow-up.    46-year-old female presents for recheck regarding abdominal pain, peptic ulcer, gastroparesis and chronic constipation.  Reports since last visit she has been hospitalized twice had small bowel obstruction requiring surgery renal stones requiring surgery.  States since she has recovered from undergo surgery she has done very well.  Reports abdominal pain is much better just reports some mild occasional soreness.  Would like refills of Zofran for intermittent nausea and Linzess for chronic constipation.  Reports she seems to be going more but still on average 1 time per week or less states if she goes long time between medicine she will have watery stool in between large bowel movements    Constipation  This is a chronic problem. The current episode started more than 1 month ago. The problem has been waxing and waning since onset. Her stool frequency is 1 time per week or less. The stool is described as formed. The patient is not on a high fiber diet. She exercises regularly. There has been adequate water intake. Associated symptoms include abdominal pain, back pain, bloating and nausea. Pertinent negatives include no anorexia, diarrhea, fecal incontinence, fever, flatus, hematochezia, hemorrhoids, rectal pain or vomiting. Risk factors include change in medication usage/dosage. She has tried diet changes and stool softeners for the symptoms. The treatment provided mild relief. Her past medical history is significant for abdominal surgery.   Previous EGD noted retained food in the stomach and she was unable to do colonoscopy         The following portions of the patient's history were reviewed and updated as appropriate:   Past Medical History:   Diagnosis Date   • Anemia    • Anxiety    • Cushing disease (CMS/HCC)    • Depression    •  Diabetes mellitus (CMS/HCC)    • GERD (gastroesophageal reflux disease)     OCCASIONALLY   • Hypertension    • Kidney stones    • Low back pain    • MRSA infection    • Multiple joint pain    • Other long term (current) drug therapy    • Pancreatitis      Past Surgical History:   Procedure Laterality Date   • ADENOIDECTOMY     • APPENDECTOMY     •  SECTION     • CHOLECYSTECTOMY     • CYSTOSCOPY, URETEROSCOPY, RETROGRADE PYELOGRAM, STENT INSERTION Right 2020    Procedure: CYSTOSCOPY, RIGHT RETROGRAE, URETEROSCOPY, LASER LITHOTRIPSY, STENT PLACEMENT  LATEX ALLERGY;  Surgeon: Bakari Starr MD;  Location: [facility] OR;  Service: Urology;  Laterality: Right;   • ENDOSCOPY N/A 2021    Procedure: ESOPHAGOGASTRODUODENOSCOPY possible dilation Spring break week;  Surgeon: Joaquim Santillan MD;  Location: [facility] ENDOSCOPY;  Service: Gastroenterology;  Laterality: N/A;   • EXPLORATORY LAPAROTOMY N/A 10/24/2020    Procedure: LAPAROTOMY EXPLORATORY AND CLOSURE OF GASTRIC ULCER,;  Surgeon: Valeriano Goldberg MD;  Location: [facility] OR;  Service: General;  Laterality: N/A;   • EXPLORATORY LAPAROTOMY     • FRACTURE SURGERY Left     LLE with plates and screws   • HYSTERECTOMY     • INJECTION OF MEDICATION  2015    Toradol (1)      • TONSILLECTOMY       Family History   Problem Relation Age of Onset   • Rheum arthritis Mother    • Cancer Mother         lung cancer   • Heart disease Mother    • Cancer Father    • No Known Problems Sister    • No Known Problems Brother    • Diabetes Maternal Grandmother    • No Known Problems Maternal Grandfather    • No Known Problems Paternal Grandmother    • No Known Problems Paternal Grandfather    • No Known Problems Sister    • Cerebral palsy Child      OB History        1    Para   0    Term   0       0    AB   0    Living   1       SAB   0    TAB   0    Ectopic   0    Molar        Multiple   0    Live Births                  Prior to Admission medications     Medication Sig Start Date End Date Taking? Authorizing Provider   Buprenorphine ER (Sublocade) 300 MG/1.5ML solution prefilled syringe Inject  under the skin into the appropriate area as directed.   Yes Renae Perla MD   Continuous Blood Gluc  (FreeStyle Tracee 2 Norman Systm) device 1 each Continuous. Use as indicated for glucose monitoring 11/11/20  Yes Dano Wong APRN   dilTIAZem (CARDIZEM) 30 MG tablet Take 1 tablet by mouth 3 (Three) Times a Day. 10/28/20  Yes Valeriano Godlberg MD   docusate sodium (COLACE) 100 MG capsule Take 1 capsule by mouth 2 (Two) Times a Day As Needed for Constipation. 4/2/21  Yes Darnell Reyes MD   DULoxetine (CYMBALTA) 60 MG capsule Take 60 mg by mouth Daily.   Yes Renae Perla MD   famotidine (PEPCID) 40 MG tablet Take 40 mg by mouth Daily.   Yes Renae Perla MD   ferrous sulfate 325 (65 FE) MG tablet Take 1 tablet by mouth Daily With Breakfast. 4/2/21  Yes Darnell Reyes MD   folic acid (FOLVITE) 1 MG tablet Take 1 tablet by mouth Daily. 4/2/21  Yes Darnell Reyes MD   glucagon (Glucagon Emergency) 1 MG injection Inject 1 mg under the skin into the appropriate area as directed 1 (One) Time As Needed for Low Blood Sugar for up to 1 dose. 8/18/20  Yes Raza Verduzco MD   hydrOXYzine (ATARAX) 50 MG tablet Take 50 mg by mouth As Needed.   Yes Renae Perla MD   Insulin Lispro, 1 Unit Dial, (HumaLOG KwikPen) 100 UNIT/ML solution pen-injector USE UP TO 20 UNITS 3 TIMES A DAY WITH MEALS 8/23/21  Yes Raza Verduzco MD   Insulin Pen Needle (PEN NEEDLES) 32G X 4 MM misc 1 each 4 (Four) Times a Day. Use 4 x daily, Dx code E11.65 8/18/20  Yes Raza Verduzco MD   Invokana 100 MG tablet tablet TAKE 1 TABLET BY MOUTH EVERY DAY  Patient taking differently: Take 100 mg by mouth Daily. 3/31/21  Yes Wong, Dano [place], APRN   Lantus SoloStar 100 UNIT/ML injection pen Inject 50 Units under the skin into the  appropriate area as directed Every Night. 10/19/20  Yes Renae Perla MD   linaclotide (Linzess) 145 MCG capsule capsule Take 1 capsule by mouth Every Morning Before Breakfast. 4/20/21  Yes Lizett Vides APRN   lisinopril (PRINIVIL,ZESTRIL) 2.5 MG tablet Take 2.5 mg by mouth Daily.   Yes Renae Perla MD   ondansetron ODT (ZOFRAN-ODT) 4 MG disintegrating tablet PLACE 1 TABLET ON THE TONGUE EVERY 8 (EIGHT) HOURS AS NEEDED FOR NAUSEA OR VOMITING. 6/7/21  Yes Lizett Vides APRN   pantoprazole (PROTONIX) 40 MG EC tablet Take 40 mg by mouth Daily. 11/13/20  Yes Renae Perla MD   potassium chloride (K-DUR,KLOR-CON) 20 MEQ CR tablet Take 20 mEq by mouth 2 (Two) Times a Day. 3/19/21  Yes Renae Perla MD   predniSONE (DELTASONE) 5 MG tablet Take 5 mg by mouth 2 (Two) Times a Day.   Yes Renae Perla MD   rosuvastatin (CRESTOR) 20 MG tablet Take 20 mg by mouth Daily. 11/9/20  Yes Renae Perla MD   Semaglutide,0.25 or 0.5MG/DOS, (Ozempic, 0.25 or 0.5 MG/DOSE,) 2 MG/1.5ML solution pen-injector Inject 0.5 mg under the skin into the appropriate area as directed 1 (One) Time Per Week. 2/24/21  Yes Dano Wong APRN   tiZANidine (ZANAFLEX) 4 MG tablet Take 4 mg by mouth Every 8 (Eight) Hours As Needed for Muscle Spasms.   Yes Renae Perla MD   vitamin B-12 (CYANOCOBALAMIN) 1000 MCG tablet Take 1 tablet by mouth Daily. 4/2/21  Yes Darnell Reyes MD   Continuous Blood Gluc Sensor (FreeStyle Tracee 2 Sensor Systm) misc 1 each Every 14 (Fourteen) Days. Use every 14 days 11/11/20 9/1/21 Yes Dano Wong APRN   Brexpiprazole (Rexulti) 1 MG tablet Take 1 mg by mouth Daily.    Renae Perla MD   buprenorphine-naloxone (SUBOXONE) 8-2 MG per SL tablet TAKE 3 TABLETS DISSOLVED UNDER THE TONGUE DAILY 7/15/21   Provider, MD Renae   fluconazole (DIFLUCAN) 150 MG tablet Take 1 tablet day of infection and 1 tablet 3 days later 8/18/20   Bradley Abdullahi,  "Raza HUNT MD   magnesium oxide (MAG-OX) 400 MG tablet Take 200 mg by mouth Daily.    Provider, MD Renae     Allergies   Allergen Reactions   • Adhesive Tape Unknown - Low Severity     Tares skin    • Bactrim [Sulfamethoxazole-Trimethoprim] Hives   • Ciprofloxacin Hives   • Latex Rash     Only when pt wears latex gloves      Social History     Socioeconomic History   • Marital status: Single     Spouse name: Not on file   • Number of children: 1   • Years of education: Not on file   • Highest education level: Not on file   Tobacco Use   • Smoking status: Current Every Day Smoker     Packs/day: 1.00     Years: 20.00     Pack years: 20.00     Types: Cigarettes   • Smokeless tobacco: Never Used   Vaping Use   • Vaping Use: Some days   • Substances: Nicotine, Flavoring   • Devices: Disposable   Substance and Sexual Activity   • Alcohol use: No   • Drug use: No   • Sexual activity: Defer       Review of Systems  Review of Systems   Constitutional: Positive for fatigue. Negative for activity change, appetite change, chills, diaphoresis, fever and unexpected weight change.   HENT: Positive for trouble swallowing (intermittent ).    Gastrointestinal: Positive for abdominal pain, bloating, constipation and nausea. Negative for abdominal distention, anal bleeding, anorexia, blood in stool, diarrhea, flatus, hematochezia, hemorrhoids, rectal pain and vomiting.   Musculoskeletal: Positive for back pain.        /81 (BP Location: Left arm)   Pulse 113   Ht 167.6 cm (66\")   Wt 62.8 kg (138 lb 6.4 oz)   BMI 22.34 kg/m²     Objective    Physical Exam  Constitutional:       General: She is not in acute distress.     Appearance: Normal appearance. She is normal weight. She is not ill-appearing.   Pulmonary:      Effort: Pulmonary effort is normal.   Abdominal:      General: Bowel sounds are normal. There is no distension.      Palpations: Abdomen is soft. There is no mass.      Tenderness: There is no abdominal " tenderness.       Lab on 04/03/2021   Component Date Value Ref Range Status   • COVID19 04/03/2021 Not Detected  Not Detected - Ref. Range Final     Assessment/Plan      1. Gastroesophageal reflux disease with esophagitis without hemorrhage    2. Nausea    3. Peptic ulcer disease    .   Discussed repeating EGD and colon however patient prefers not to schedule during current Covid pandemic symptoms are improved currently.  We will follow-up in 3 months and plan EGD and colonoscopy  At that time if possible will follow up in office sooner if symptoms persist or worsen    Orders placed during this encounter include:  No orders of the defined types were placed in this encounter.      * Surgery not found *    Review and/or summary of lab tests, radiology, procedures, medications. Review and summary of old records and obtaining of history. The risks and benefits of my recommendations, as well as other treatment options were discussed with the patient today. Questions were answered.    New Medications Ordered This Visit   Medications   • linaclotide (Linzess) 145 MCG capsule capsule     Sig: Take 1 capsule by mouth Every Morning Before Breakfast.     Dispense:  30 capsule     Refill:  5   • ondansetron ODT (ZOFRAN-ODT) 4 MG disintegrating tablet     Sig: Place 1 tablet on the tongue Every 8 (Eight) Hours As Needed for Nausea or Vomiting.     Dispense:  20 tablet     Refill:  2       Follow-up: Return in about 3 months (around 12/1/2021).          This document has been electronically signed by LEILANI Rouse on September 1, 2021 13:38 CDT           I spent 32 minutes caring for Jeanie on this date of service. This time includes time spent by me in the following activities:preparing for the visit, reviewing tests, obtaining and/or reviewing a separately obtained history, performing a medically appropriate examination and/or evaluation , counseling and educating the patient/family/caregiver, ordering medications, tests,  or procedures, referring and communicating with other health care professionals , documenting information in the medical record and care coordination    Results for orders placed or performed in visit on 04/03/21   COVID-19, [facility] IN-HOUSE, NP SWAB IN TRANSPORT MEDIA 8-10 HR TAT - Swab, Nasopharynx    Specimen: Nasopharynx; Swab   Result Value Ref Range    COVID19 Not Detected Not Detected - Ref. Range   Results for orders placed or performed in visit on 04/01/21   Von Willebrand Disease Profile    Specimen: Arm, Right; Blood   Result Value Ref Range    aPTT 22.2 (L) sec    Factor VIII Activity 68 %    VWF Activity 150 %    Von Willebrand Ag 171 %    vwF Collagen Binding Act. 178 (H) %    vWF Collagen Binding 1.0 ratio   CBC Auto Differential    Specimen: Arm, Right; Blood   Result Value Ref Range    WBC 19.49 (H) 3.40 - 10.80 10*3/mm3    RBC 4.83 3.77 - 5.28 10*6/mm3    Hemoglobin 12.7 12.0 - 15.9 g/dL    Hematocrit 40.6 34.0 - 46.6 %    MCV 84.1 79.0 - 97.0 fL    MCH 26.3 (L) 26.6 - 33.0 pg    MCHC 31.3 (L) 31.5 - 35.7 g/dL    RDW 18.4 (H) 12.3 - 15.4 %    RDW-SD 55.4 (H) 37.0 - 54.0 fl    MPV 8.5 6.0 - 12.0 fL    Platelets 364 140 - 450 10*3/mm3    Neutrophil % 80.8 (H) 42.7 - 76.0 %    Lymphocyte % 11.3 (L) 19.6 - 45.3 %    Monocyte % 6.0 5.0 - 12.0 %    Eosinophil % 0.8 0.3 - 6.2 %    Basophil % 0.5 0.0 - 1.5 %    Immature Grans % 0.6 (H) 0.0 - 0.5 %    Neutrophils, Absolute 15.75 (H) 1.70 - 7.00 10*3/mm3    Lymphocytes, Absolute 2.21 0.70 - 3.10 10*3/mm3    Monocytes, Absolute 1.16 (H) 0.10 - 0.90 10*3/mm3    Eosinophils, Absolute 0.15 0.00 - 0.40 10*3/mm3    Basophils, Absolute 0.10 0.00 - 0.20 10*3/mm3    Immature Grans, Absolute 0.12 (H) 0.00 - 0.05 10*3/mm3    nRBC 0.0 0.0 - 0.2 /100 WBC   Iron and TIBC    Specimen: Arm, Right; Blood   Result Value Ref Range    Iron 21 (L) 37 - 145 mcg/dL    Iron Saturation 4 (L) 20 - 50 %    Transferrin 369 (H) 200 - 360 mg/dL    TIBC 550 (H) 298 - 536 mcg/dL    Protime-INR    Specimen: Arm, Right; Blood   Result Value Ref Range    Protime 11.9 11.1 - 15.3 Seconds    INR 0.85 0.80 - 1.20   Lactate Dehydrogenase    Specimen: Arm, Right; Blood   Result Value Ref Range     135 - 214 U/L   Folate    Specimen: Arm, Right; Blood   Result Value Ref Range    Folate 7.93 4.78 - 24.20 ng/mL   Ferritin    Specimen: Arm, Right; Blood   Result Value Ref Range    Ferritin 9.38 (L) 13.00 - 150.00 ng/mL   Results for orders placed or performed during the hospital encounter of 10/24/20   Gold Top - SST   Result Value Ref Range    Extra Tube Hold for add-ons.    Gold Top - SST   Result Value Ref Range    Extra Tube Hold for add-ons.    Green Top (Gel)   Result Value Ref Range    Extra Tube Hold for add-ons.    Blood Gas, Arterial With Co-Ox    Specimen: Arterial Blood   Result Value Ref Range    Site Arterial Line     Elia's Test N/A     pH, Arterial 7.403 7.350 - 7.450 pH units    pCO2, Arterial 34.3 (L) 35.0 - 45.0 mm Hg    pO2, Arterial 80.5 (L) 83.0 - 108.0 mm Hg    HCO3, Arterial 21.4 20.0 - 26.0 mmol/L    Base Excess, Arterial -2.8 (L) 0.0 - 2.0 mmol/L    O2 Saturation, Arterial 96.7 94.0 - 99.0 %    Hemoglobin, Blood Gas 11.6 (L) 13.5 - 17.5 g/dL    Hematocrit, Blood Gas 35.6 (L) 38.0 - 51.0 %    Oxyhemoglobin 93.8 (L) 94 - 99 %    Methemoglobin 0.30 0.00 - 3.00 %    Carboxyhemoglobin 2.7 0 - 5 %    A-a Gradiant 28.1 mmHg    Sodium, Arterial 137 136 - 146 mmol/L    Potassium, Arterial 3.9 3.4 - 4.5 mmol/L    Ionized Calcium 4.75 4.60 - 5.60 mg/dL    Glucose, Arterial 137 (H) 65 - 95 mmol/L    Barometric Pressure for Blood Gas 749 mmHg    Modality N/A     Ventilator Mode NA     Note      Collected by RT     pH, Temp Corrected      pCO2, Temperature Corrected      pO2, Temperature Corrected     Blood Gas, Arterial With Co-Ox    Specimen: Arterial Blood   Result Value Ref Range    Site Arterial Line     Elia's Test N/A     pH, Arterial 7.379 7.350 - 7.450 pH units    pCO2,  Arterial 28.3 (L) 35.0 - 45.0 mm Hg    pO2, Arterial 124.0 (H) 83.0 - 108.0 mm Hg    HCO3, Arterial 16.7 (L) 20.0 - 26.0 mmol/L    Base Excess, Arterial -7.2 (L) 0.0 - 2.0 mmol/L    O2 Saturation, Arterial 99.0 94.0 - 99.0 %    Hemoglobin, Blood Gas 11.1 (L) 13.5 - 17.5 g/dL    Hematocrit, Blood Gas 34.1 (L) 38.0 - 51.0 %    Oxyhemoglobin 95.6 94 - 99 %    Methemoglobin 0.50 0.00 - 3.00 %    Carboxyhemoglobin 3.0 0 - 5 %    A-a Gradiant      Sodium, Arterial 140 136 - 146 mmol/L    Potassium, Arterial 2.9 (L) 3.4 - 4.5 mmol/L    Ionized Calcium 3.71 (L) 4.60 - 5.60 mg/dL    Glucose, Arterial 120 (H) 65 - 95 mmol/L    Barometric Pressure for Blood Gas 749 mmHg    Modality N/A     Ventilator Mode NA     Note      Collected by RT     pH, Temp Corrected      pCO2, Temperature Corrected      pO2, Temperature Corrected     COVID-19, [facility] IN-HOUSE, NP SWAB IN TRANSPORT MEDIA 8-10 HR TAT - Swab, Nasopharynx    Specimen: Nasopharynx; Swab   Result Value Ref Range    COVID19 Not Detected Not Detected - Ref. Range   Urinalysis With Microscopic If Indicated (No Culture) - Urine, Catheter    Specimen: Urine, Catheter   Result Value Ref Range    Color, UA Yellow Yellow, Straw, Dark Yellow, Maureen    Appearance, UA Clear Clear    pH, UA 6.5 5.0 - 9.0    Specific Gravity, UA 1.067 (H) 1.003 - 1.030    Glucose, UA >=1000 mg/dL (3+) (A) Negative    Ketones, UA 15 mg/dL (1+) (A) Negative    Bilirubin, UA Negative Negative    Blood, UA Negative Negative    Protein, UA Negative Negative    Leuk Esterase, UA Negative Negative    Nitrite, UA Negative Negative    Urobilinogen, UA 1.0 E.U./dL 0.2 - 1.0 E.U./dL     *Note: Due to a large number of results and/or encounters for the requested time period, some results have not been displayed. A complete set of results can be found in Results Review.

## 2021-09-01 NOTE — PATIENT INSTRUCTIONS
MyPlate from USDA    MyPlate is an outline of a general healthy diet based on the 2010 Dietary Guidelines for Americans, from the U.[place] Department of Agriculture (USDA). It sets guidelines for how much food you should eat from each food group based on your age, sex, and level of physical activity.  What are tips for following MyPlate?  To follow MyPlate recommendations:  · Eat a wide variety of fruits and vegetables, grains, and protein foods.  · Serve smaller portions and eat less food throughout the day.  · Limit portion sizes to avoid overeating.  · Enjoy your food.  · Get at least 150 minutes of exercise every week. This is about 30 minutes each day, 5 or more days per week.  It can be difficult to have every meal look like MyPlate. Think about MyPlate as eating guidelines for an entire day, rather than each individual meal.  Fruits and vegetables  · Make half of your plate fruits and vegetables.  · Eat many different colors of fruits and vegetables each day.  · For a 2,000 calorie daily food plan, eat:  ? 2½ cups of vegetables every day.  ? 2 cups of fruit every day.  · 1 cup is equal to:  ? 1 cup raw or cooked vegetables.  ? 1 cup raw fruit.  ? 1 medium-sized orange, apple, or banana.  ? 1 cup 100% fruit or vegetable juice.  ? 2 cups raw leafy greens, such as lettuce, spinach, or kale.  ? ½ cup dried fruit.  Grains  · One fourth of your plate should be grains.  · Make at least half of the grains you eat each day whole grains.  · For a 2,000 calorie daily food plan, eat 6 oz of grains every day.  · 1 oz is equal to:  ? 1 slice bread.  ? 1 cup cereal.  ? ½ cup cooked rice, cereal, or pasta.  Protein  · One fourth of your plate should be protein.  · Eat a wide variety of protein foods, including meat, poultry, fish, eggs, beans, nuts, and tofu.  · For a 2,000 calorie daily food plan, eat 5½ oz of protein every day.  · 1 oz is equal to:  ? 1 oz meat, poultry, or fish.  ? ¼ cup cooked beans.  ? 1 egg.  ? ½ oz nuts  or seeds.  ? 1 Tbsp peanut butter.  Dairy  · Drink fat-free or low-fat (1%) milk.  · Eat or drink dairy as a side to meals.  · For a 2,000 calorie daily food plan, eat or drink 3 cups of dairy every day.  · 1 cup is equal to:  ? 1 cup milk, yogurt, cottage cheese, or soy milk (soy beverage).  ? 2 oz processed cheese.  ? 1½ oz natural cheese.  Fats, oils, salt, and sugars  · Only small amounts of oils are recommended.  · Avoid foods that are high in calories and low in nutritional value (empty calories), like foods high in fat or added sugars.  · Choose foods that are low in salt (sodium). Choose foods that have less than 140 milligrams (mg) of sodium per serving.  · Drink water instead of sugary drinks. Drink enough water each day to keep your urine pale yellow.  Where to find support  · Work with your health care provider or a nutrition specialist (dietitian) to develop a customized eating plan that is right for you.  · Download an sirena (mobile application) to help you track your daily food intake.  Where to find more information  · Go to ChooseMyPlate.gov for more information.  Summary  · MyPlate is a general guideline for healthy eating from the USDA. It is based on the 2010 Dietary Guidelines for Americans.  · In general, fruits and vegetables should take up ½ of your plate, grains should take up ¼ of your plate, and protein should take up ¼ of your plate.  This information is not intended to replace advice given to you by your health care provider. Make sure you discuss any questions you have with your health care provider.  Document Revised: 05/21/2020 Document Reviewed: 03/19/2018  Elsevier Patient Education © 2021 [employer]    Gastroesophageal Reflux Disease, Adult  Gastroesophageal reflux (NADYA) happens when acid from the stomach flows up into the tube that connects the mouth and the stomach (esophagus). Normally, food travels down the esophagus and stays in the stomach to be digested. With NADYA, food and  stomach acid sometimes move back up into the esophagus. You may have a disease called gastroesophageal reflux disease (GERD) if the reflux:  · Happens often.  · Causes frequent or very bad symptoms.  · Causes problems such as damage to the esophagus.  When this happens, the esophagus becomes sore and swollen (inflamed). Over time, GERD can make small holes (ulcers) in the lining of the esophagus.  What are the causes?  This condition is caused by a problem with the muscle between the esophagus and the stomach. When this muscle is weak or not normal, it does not close properly to keep food and acid from coming back up from the stomach. The muscle can be weak because of:  · Tobacco use.  · Pregnancy.  · Having a certain type of hernia (hiatal hernia).  · Alcohol use.  · Certain foods and drinks, such as coffee, chocolate, onions, and peppermint.  What increases the risk?  You are more likely to develop this condition if you:  · Are overweight.  · Have a disease that affects your connective tissue.  · Use NSAID medicines.  What are the signs or symptoms?  Symptoms of this condition include:  · Heartburn.  · Difficult or painful swallowing.  · The feeling of having a lump in the throat.  · A bitter taste in the mouth.  · Bad breath.  · Having a lot of saliva.  · Having an upset or bloated stomach.  · Belching.  · Chest pain. Different conditions can cause chest pain. Make sure you see your doctor if you have chest pain.  · Shortness of breath or noisy breathing (wheezing).  · Ongoing (chronic) cough or a cough at night.  · Wearing away of the surface of teeth (tooth enamel).  · Weight loss.  How is this treated?  Treatment will depend on how bad your symptoms are. Your doctor may suggest:  · Changes to your diet.  · Medicine.  · Surgery.  Follow these instructions at home:  Eating and drinking    · Follow a diet as told by your doctor. You may need to avoid foods and drinks such as:  ? Coffee and tea (with or without  caffeine).  ? Drinks that contain alcohol.  ? Energy drinks and sports drinks.  ? Bubbly (carbonated) drinks or sodas.  ? Chocolate and cocoa.  ? Peppermint and mint flavorings.  ? Garlic and onions.  ? Horseradish.  ? Spicy and acidic foods. These include peppers, chili powder, lozano powder, vinegar, hot sauces, and BBQ sauce.  ? Citrus fruit juices and citrus fruits, such as oranges, arvind, and limes.  ? Tomato-based foods. These include red sauce, chili, salsa, and pizza with red sauce.  ? Fried and fatty foods. These include donuts, french fries, potato chips, and high-fat dressings.  ? High-fat meats. These include hot dogs, rib eye steak, sausage, ham, and maguire.  ? High-fat dairy items, such as whole milk, butter, and cream cheese.  · Eat small meals often. Avoid eating large meals.  · Avoid drinking large amounts of liquid with your meals.  · Avoid eating meals during the 2-3 hours before bedtime.  · Avoid lying down right after you eat.  · Do not exercise right after you eat.  Lifestyle    · Do not use any products that contain nicotine or tobacco. These include cigarettes, e-cigarettes, and chewing tobacco. If you need help quitting, ask your doctor.  · Try to lower your stress. If you need help doing this, ask your doctor.  · If you are overweight, lose an amount of weight that is healthy for you. Ask your doctor about a safe weight loss goal.  General instructions  · Pay attention to any changes in your symptoms.  · Take over-the-counter and prescription medicines only as told by your doctor. Do not take aspirin, ibuprofen, or other NSAIDs unless your doctor says it is okay.  · Wear loose clothes. Do not wear anything tight around your waist.  · Raise (elevate) the head of your bed about 6 inches (15 cm).  · Avoid bending over if this makes your symptoms worse.  · Keep all follow-up visits as told by your doctor. This is important.  Contact a doctor if:  · You have new symptoms.  · You lose weight and  you do not know why.  · You have trouble swallowing or it hurts to swallow.  · You have wheezing or a cough that keeps happening.  · Your symptoms do not get better with treatment.  · You have a hoarse voice.  Get help right away if:  · You have pain in your arms, neck, jaw, teeth, or back.  · You feel sweaty, dizzy, or light-headed.  · You have chest pain or shortness of breath.  · You throw up (vomit) and your throw-up looks like blood or coffee grounds.  · You pass out (faint).  · Your poop (stool) is bloody or black.  · You cannot swallow, drink, or eat.  Summary  · If a person has gastroesophageal reflux disease (GERD), food and stomach acid move back up into the esophagus and cause symptoms or problems such as damage to the esophagus.  · Treatment will depend on how bad your symptoms are.  · Follow a diet as told by your doctor.  · Take all medicines only as told by your doctor.  This information is not intended to replace advice given to you by your health care provider. Make sure you discuss any questions you have with your health care provider.  Document Revised: 06/26/2019 Document Reviewed: 06/26/2019  "[employer]" Patient Education © 2021 [employer] Inc.

## 2021-09-13 DIAGNOSIS — E11.65 TYPE 2 DIABETES MELLITUS WITH HYPERGLYCEMIA, WITH LONG-TERM CURRENT USE OF INSULIN (HCC): ICD-10-CM

## 2021-09-13 DIAGNOSIS — Z79.4 TYPE 2 DIABETES MELLITUS WITH HYPERGLYCEMIA, WITH LONG-TERM CURRENT USE OF INSULIN (HCC): ICD-10-CM

## 2021-09-13 RX ORDER — LINACLOTIDE 145 UG/1
CAPSULE, GELATIN COATED ORAL
Qty: 30 CAPSULE | Refills: 5 | Status: SHIPPED | OUTPATIENT
Start: 2021-09-13

## 2021-09-13 RX ORDER — FLASH GLUCOSE SENSOR
KIT MISCELLANEOUS
Qty: 2 EACH | Refills: 11 | Status: SHIPPED | OUTPATIENT
Start: 2021-09-13 | End: 2022-09-22

## 2021-09-13 RX ORDER — CANAGLIFLOZIN 100 MG/1
TABLET, FILM COATED ORAL
Qty: 30 TABLET | Refills: 5 | Status: SHIPPED | OUTPATIENT
Start: 2021-09-13 | End: 2022-06-15

## 2021-09-28 RX ORDER — ONDANSETRON 4 MG/1
4 TABLET, ORALLY DISINTEGRATING ORAL EVERY 8 HOURS PRN
Qty: 20 TABLET | Refills: 2 | Status: SHIPPED | OUTPATIENT
Start: 2021-09-28 | End: 2021-11-01

## 2021-10-05 RX ORDER — FLUCONAZOLE 150 MG/1
TABLET ORAL
Qty: 2 TABLET | Refills: 6 | Status: SHIPPED | OUTPATIENT
Start: 2021-10-05 | End: 2022-07-29

## 2021-10-05 RX ORDER — PREDNISONE 1 MG/1
TABLET ORAL
Qty: 720 TABLET | Refills: 11 | Status: SHIPPED | OUTPATIENT
Start: 2021-10-05 | End: 2022-12-13

## 2021-11-01 RX ORDER — ONDANSETRON 4 MG/1
4 TABLET, ORALLY DISINTEGRATING ORAL EVERY 8 HOURS PRN
Qty: 20 TABLET | Refills: 2 | Status: SHIPPED | OUTPATIENT
Start: 2021-11-01 | End: 2022-09-12

## 2021-11-22 RX ORDER — PANTOPRAZOLE SODIUM 40 MG/1
40 TABLET, DELAYED RELEASE ORAL DAILY
Qty: 30 TABLET | Refills: 5 | Status: SHIPPED | OUTPATIENT
Start: 2021-11-22 | End: 2023-03-24

## 2022-03-01 RX ORDER — SEMAGLUTIDE 1.34 MG/ML
INJECTION, SOLUTION SUBCUTANEOUS
Qty: 4.5 ML | Refills: 11 | Status: SHIPPED | OUTPATIENT
Start: 2022-03-01

## 2022-03-29 ENCOUNTER — TELEMEDICINE (OUTPATIENT)
Dept: ENDOCRINOLOGY | Facility: CLINIC | Age: 47
End: 2022-03-29

## 2022-03-29 DIAGNOSIS — E55.9 VITAMIN D DEFICIENCY: ICD-10-CM

## 2022-03-29 DIAGNOSIS — E11.65 TYPE 2 DIABETES MELLITUS WITH HYPERGLYCEMIA, WITH LONG-TERM CURRENT USE OF INSULIN: Primary | ICD-10-CM

## 2022-03-29 DIAGNOSIS — R21 RASH: ICD-10-CM

## 2022-03-29 DIAGNOSIS — Z79.4 TYPE 2 DIABETES MELLITUS WITH HYPERGLYCEMIA, WITH LONG-TERM CURRENT USE OF INSULIN: Primary | ICD-10-CM

## 2022-03-29 PROCEDURE — 99214 OFFICE O/P EST MOD 30 MIN: CPT | Performed by: NURSE PRACTITIONER

## 2022-03-29 RX ORDER — INSULIN GLARGINE 100 [IU]/ML
20 INJECTION, SOLUTION SUBCUTANEOUS NIGHTLY
Qty: 2 PEN | Refills: 11 | Status: SHIPPED | OUTPATIENT
Start: 2022-03-29 | End: 2023-03-24

## 2022-03-29 NOTE — PROGRESS NOTES
Chief Complaint  Diabetes    Subjective          Jeanie Anjelica Jones presents to [facility]  History of Present Illness     You have chosen to receive care through a telehealth visit.  Do you consent to use a video/audio connection for your medical care today? 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     Diagnosed in 2019      Timing constant     Quality not controlled       Severity moderate              Aggravating factors --steroid use         Blood glucose readings      She checks 4 times daily and has been for over 90 days       Tracee personal use -states back in to range    She states went rogue and was drinking [place] de[place]     Has stopped drinking [place] dew    Variable readings from 77 up to 300        ---------------------------------------------------------------------------        RASH      Has been treated with prednisone for about 5 years      Rash is on the arms and legs     It is widespread, she does have itching      alleviating factors -- steroids and atarax      Modifying factors include the use of steroids and Atarax         Review of Systems - General ROS: negative            Objective   Vital Signs:   There were no vitals taken for this visit.    Physical Exam  Neurological:      General: No focal deficit present.      Mental Status: She is alert.   Psychiatric:         Mood and Affect: Mood normal.         Thought Content: Thought content normal.         Judgment: Judgment normal.        Result Review :   The following data was reviewed by: LEILANI Aguilar on 03/29/2022:  Common labs    Common Labsle 7/20/21 7/21/21 1/14/22 1/14/22 1/14/22      0913 0913 0913   Glucose 199 (A)    292 (A)   BUN 16    11    Creatinine 1.0    0.7   eGFR African Am 73       Sodium 134 (A)    136   Potassium 3.7    3.0 (A)   Chloride 98    96 (A)   Calcium 9.2    9.6   Albumin 4.1    3.8   Total Bilirubin 0.51    0.28 (A)   Alkaline Phosphatase 53    66   AST (SGOT) 13    15   ALT (SGPT) 13    18   LDL Cholesterol     154    Hemoglobin A1C  6.5 (A) 11.8 (A)     (A) Abnormal value       Comments are available for some flowsheets but are not being displayed.                     Assessment and Plan    Diagnoses and all orders for this visit:    1. Type 2 diabetes mellitus with hyperglycemia, with long-term current use of insulin (HCC) (Primary)  -     CBC & Differential; Future  -     Comprehensive Metabolic Panel; Future  -     Hemoglobin A1c; Future  -     TSH; Future  -     Vitamin D 25 Hydroxy; Future  -     Lipid Panel; Future  -     Microalbumin / Creatinine Urine Ratio - Urine, Clean Catch; Future    2. Vitamin D deficiency  -     CBC & Differential; Future  -     Comprehensive Metabolic Panel; Future  -     Hemoglobin A1c; Future  -     TSH; Future  -     Vitamin D 25 Hydroxy; Future  -     Lipid Panel; Future  -     Microalbumin / Creatinine Urine Ratio - Urine, Clean Catch; Future    3. Rash    Other orders  -     Lantus SoloStar 100 UNIT/ML injection pen; Inject 20 Units under the skin into the appropriate area as directed Every Night.  Dispense: 2 pen; Refill: 11           Rash ---            Using atarax 50 mg tid --continue       Rash does not appear to be endocrine related      Component      Latest Ref Rng & Units 8/18/2020 8/20/2020   Creatinine, 24H       0.70 - 1.60 g/24 hr   0.66 (L)   Creatinine, Urine      mg/dL   36.9   Urine Volume      mL   1,800   Time (Hours)      hrs   24   5-HIAA, Urine      Undefined mg/L   3.0   5-HIAA, 24H Ur      0.0 - 14.9 mg/24 hr   5.4   Calcitonin      0.0 - 5.0 pg/mL <2.0     Insulin-Like Growth Factor-1      74 - 239 ng/mL 94     MANUEL Direct      Negative Negative     Uric  Acid      2.4 - 5.7 mg/dL 2.6     C-Reactive Protein      0.00 - 0.50 mg/dL 0.36     Sed Rate      0 - 20 mm/hr 2     C-Peptide      1.1 - 4.4 ng/mL 5.4 (H)                       -----------        Cushing's medication induced / adrenal insufficiency            Prednisone            Now on 5 mg tablets       taking one am and one pm      If fever , nausea or vomiting , take 100 mg IM and head to ER                    ------------     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     Lab Results   Component Value Date    HGBA1C 11.8 (H) 01/14/2022             Taking invokana 100 mg daily         Lantus taking take 10 units --ran out of lantus --refill today      If am sugars are less than 80 decrease by 5 units            Humalog      Taking 4 units before each meals --increase to 6 unit sTID before each meal          Plus         151-200: 2units  201-250: 4 units  251-300 : 6units  Above 300 : 8 units           The goal is to have a sugar 2 hours after eating less than 180            Taking Ozempic 0.5 mg once weekly            No changes above -- bg is at goal      She now has a Cliq personal system     She uses the Cliq data to adjust her insulin dosages            approve omni pod       Approve for  Insulin pump and or Continuous Glucose Sensor      #1  Patient has diabetes mellitus, insulin-dependent.     #2 She performs blood glucose testing at least times daily and blood glucose log was brought to office with variability from .     #3  She is requiring  Basal insulin  and Prandial Insulin for a total of at least  4 injections per day and has been doing this for at least 6 months      #4 Patient tests blood sugars at least 4 times daily and makes frequent self-adjustments and patient is injecting insulin at least 4 times daily. She has been doing this for more than 6 months . She tests frequently due to hypoglycemia and hyperglycemia.      #5 I have personally seen patient within the past 6 months     #6 We plan on  seeing her every 2-3 months for continuous adjustment of her diabetes regimen      #7 patient has hypoglycemia with episodes of unawareness.     #8 patient has day-to-day variation in her mealtime which confounds the degree of insulin dosing with multiple daily injections.     #9 patient has completed diabetes education program with us.     #10 she has demonstrated the ability to self monitor her glucose.         #11 Patient is motivated in improving  diabetes control         Bone health     Vitamin d def    reassess      Follow Up   No follow-ups on file.  Patient was given instructions and counseling regarding her condition or for health maintenance advice. Please see specific information pulled into the AVS if appropriate.         This document has been electronically signed by LEILANI Aguilar on March 29, 2022 10:30 CDT.

## 2022-04-19 ENCOUNTER — DOCUMENTATION (OUTPATIENT)
Dept: ENDOCRINOLOGY | Facility: CLINIC | Age: 47
End: 2022-04-19

## 2022-04-19 NOTE — PROGRESS NOTES
OMNIPOD PAPERWORK, DEMOGRAPHICS, AND INSURANCE CARDS SENT TO [facility]. 4/18/22    CONFIRMATION RECEIVED AND SENT TO  4/19/22

## 2022-04-26 ENCOUNTER — DOCUMENTATION (OUTPATIENT)
Dept: ENDOCRINOLOGY | Facility: CLINIC | Age: 47
End: 2022-04-26

## 2022-05-13 ENCOUNTER — DOCUMENTATION (OUTPATIENT)
Dept: ENDOCRINOLOGY | Facility: CLINIC | Age: 47
End: 2022-05-13

## 2022-05-28 ENCOUNTER — HOSPITAL ENCOUNTER (INPATIENT)
Facility: HOSPITAL | Age: 47
LOS: 3 days | Discharge: HOME | End: 2022-05-31
Attending: EMERGENCY MEDICINE | Admitting: INTERNAL MEDICINE

## 2022-05-28 ENCOUNTER — APPOINTMENT (OUTPATIENT)
Dept: ULTRASOUND IMAGING | Facility: HOSPITAL | Age: 47
End: 2022-05-28

## 2022-05-28 ENCOUNTER — APPOINTMENT (OUTPATIENT)
Dept: CT IMAGING | Facility: HOSPITAL | Age: 47
End: 2022-05-28

## 2022-05-28 ENCOUNTER — APPOINTMENT (OUTPATIENT)
Dept: GENERAL RADIOLOGY | Facility: HOSPITAL | Age: 47
End: 2022-05-28

## 2022-05-28 DIAGNOSIS — E87.6 HYPOKALEMIA: ICD-10-CM

## 2022-05-28 DIAGNOSIS — J96.01 ACUTE RESPIRATORY FAILURE WITH HYPOXIA: Primary | ICD-10-CM

## 2022-05-28 DIAGNOSIS — A41.9 SEPSIS WITHOUT ACUTE ORGAN DYSFUNCTION, DUE TO UNSPECIFIED ORGANISM: ICD-10-CM

## 2022-05-28 DIAGNOSIS — J18.9 PNEUMONIA OF BOTH LUNGS DUE TO INFECTIOUS ORGANISM, UNSPECIFIED PART OF LUNG: ICD-10-CM

## 2022-05-28 LAB
ALBUMIN SERPL-MCNC: 2.6 G/DL (ref 3.5–5.2)
ALBUMIN/GLOB SERPL: 0.8 G/DL
ALP SERPL-CCNC: 156 U/L (ref 39–117)
ALT SERPL W P-5'-P-CCNC: 11 U/L (ref 1–33)
ANION GAP SERPL CALCULATED.3IONS-SCNC: 15 MMOL/L (ref 5–15)
ANISOCYTOSIS BLD QL: NORMAL
APTT PPP: 24.9 SECONDS (ref 20–40.3)
ARTERIAL PATENCY WRIST A: ABNORMAL
AST SERPL-CCNC: 12 U/L (ref 1–32)
ATMOSPHERIC PRESS: 746 MMHG
BASE EXCESS BLDA CALC-SCNC: -2 MMOL/L (ref 0–2)
BASOPHILS # BLD AUTO: 0.08 10*3/MM3 (ref 0–0.2)
BASOPHILS NFR BLD AUTO: 0.4 % (ref 0–1.5)
BDY SITE: ABNORMAL
BILIRUB SERPL-MCNC: 0.2 MG/DL (ref 0–1.2)
BUN SERPL-MCNC: 27 MG/DL (ref 6–20)
BUN/CREAT SERPL: 44.3 (ref 7–25)
CALCIUM SPEC-SCNC: 8.3 MG/DL (ref 8.6–10.5)
CHLORIDE SERPL-SCNC: 101 MMOL/L (ref 98–107)
CO2 SERPL-SCNC: 19 MMOL/L (ref 22–29)
CREAT SERPL-MCNC: 0.61 MG/DL (ref 0.57–1)
D-LACTATE SERPL-SCNC: 1.9 MMOL/L (ref 0.5–2)
DEPRECATED RDW RBC AUTO: 50.2 FL (ref 37–54)
EGFRCR SERPLBLD CKD-EPI 2021: 113.2 ML/MIN/1.73
EOSINOPHIL # BLD AUTO: 0.72 10*3/MM3 (ref 0–0.4)
EOSINOPHIL NFR BLD AUTO: 3.6 % (ref 0.3–6.2)
ERYTHROCYTE [DISTWIDTH] IN BLOOD BY AUTOMATED COUNT: 18.9 % (ref 12.3–15.4)
FLUAV SUBTYP SPEC NAA+PROBE: NOT DETECTED
FLUBV RNA ISLT QL NAA+PROBE: NOT DETECTED
GAS FLOW AIRWAY: 6 LPM
GLOBULIN UR ELPH-MCNC: 3.2 GM/DL
GLUCOSE BLDC GLUCOMTR-MCNC: 123 MG/DL (ref 70–130)
GLUCOSE BLDC GLUCOMTR-MCNC: 227 MG/DL (ref 70–130)
GLUCOSE SERPL-MCNC: 204 MG/DL (ref 65–99)
HBA1C MFR BLD: 9.6 % (ref 4.8–5.6)
HCO3 BLDA-SCNC: 23.2 MMOL/L (ref 20–26)
HCT VFR BLD AUTO: 34.9 % (ref 34–46.6)
HGB BLD-MCNC: 11.5 G/DL (ref 12–15.9)
HOLD SPECIMEN: NORMAL
HYPOCHROMIA BLD QL: NORMAL
IMM GRANULOCYTES # BLD AUTO: 0.62 10*3/MM3 (ref 0–0.05)
IMM GRANULOCYTES NFR BLD AUTO: 3.1 % (ref 0–0.5)
INHALED O2 CONCENTRATION: <21 %
INR PPP: 1.18 (ref 0.8–1.2)
LYMPHOCYTES # BLD AUTO: 2.69 10*3/MM3 (ref 0.7–3.1)
LYMPHOCYTES NFR BLD AUTO: 13.5 % (ref 19.6–45.3)
MAGNESIUM SERPL-MCNC: 1.5 MG/DL (ref 1.6–2.6)
MCH RBC QN AUTO: 24.7 PG (ref 26.6–33)
MCHC RBC AUTO-ENTMCNC: 33 G/DL (ref 31.5–35.7)
MCV RBC AUTO: 74.9 FL (ref 79–97)
MICROCYTES BLD QL: NORMAL
MODALITY: ABNORMAL
MONOCYTES # BLD AUTO: 0.49 10*3/MM3 (ref 0.1–0.9)
MONOCYTES NFR BLD AUTO: 2.5 % (ref 5–12)
NEUTROPHILS NFR BLD AUTO: 15.34 10*3/MM3 (ref 1.7–7)
NEUTROPHILS NFR BLD AUTO: 76.9 % (ref 42.7–76)
NRBC BLD AUTO-RTO: 0.5 /100 WBC (ref 0–0.2)
NT-PROBNP SERPL-MCNC: 493.4 PG/ML (ref 0–450)
PCO2 BLDA: 40.4 MM HG (ref 35–45)
PH BLDA: 7.37 PH UNITS (ref 7.35–7.45)
PLAT MORPH BLD: NORMAL
PLATELET # BLD AUTO: 413 10*3/MM3 (ref 140–450)
PMV BLD AUTO: 8.4 FL (ref 6–12)
PO2 BLDA: 75.1 MM HG (ref 83–108)
POTASSIUM SERPL-SCNC: 3 MMOL/L (ref 3.5–5.2)
PROT SERPL-MCNC: 5.8 G/DL (ref 6–8.5)
PROTHROMBIN TIME: 14.8 SECONDS (ref 11.1–15.3)
RBC # BLD AUTO: 4.66 10*6/MM3 (ref 3.77–5.28)
SAO2 % BLDCOA: 92.1 % (ref 94–99)
SARS-COV-2 RNA PNL SPEC NAA+PROBE: NOT DETECTED
SODIUM SERPL-SCNC: 135 MMOL/L (ref 136–145)
TROPONIN T SERPL-MCNC: <0.01 NG/ML (ref 0–0.03)
VENTILATOR MODE: ABNORMAL
WBC MORPH BLD: NORMAL
WBC NRBC COR # BLD: 19.94 10*3/MM3 (ref 3.4–10.8)
WHOLE BLOOD HOLD COAG: NORMAL

## 2022-05-28 PROCEDURE — 87040 BLOOD CULTURE FOR BACTERIA: CPT | Performed by: EMERGENCY MEDICINE

## 2022-05-28 PROCEDURE — 82962 GLUCOSE BLOOD TEST: CPT

## 2022-05-28 PROCEDURE — 85025 COMPLETE CBC W/AUTO DIFF WBC: CPT | Performed by: EMERGENCY MEDICINE

## 2022-05-28 PROCEDURE — 85610 PROTHROMBIN TIME: CPT | Performed by: INTERNAL MEDICINE

## 2022-05-28 PROCEDURE — 84484 ASSAY OF TROPONIN QUANT: CPT | Performed by: EMERGENCY MEDICINE

## 2022-05-28 PROCEDURE — 82803 BLOOD GASES ANY COMBINATION: CPT

## 2022-05-28 PROCEDURE — 85730 THROMBOPLASTIN TIME PARTIAL: CPT | Performed by: INTERNAL MEDICINE

## 2022-05-28 PROCEDURE — 94799 UNLISTED PULMONARY SVC/PX: CPT

## 2022-05-28 PROCEDURE — 83880 ASSAY OF NATRIURETIC PEPTIDE: CPT | Performed by: EMERGENCY MEDICINE

## 2022-05-28 PROCEDURE — 71260 CT THORAX DX C+: CPT

## 2022-05-28 PROCEDURE — 93970 EXTREMITY STUDY: CPT

## 2022-05-28 PROCEDURE — 87636 SARSCOV2 & INF A&B AMP PRB: CPT | Performed by: EMERGENCY MEDICINE

## 2022-05-28 PROCEDURE — 83036 HEMOGLOBIN GLYCOSYLATED A1C: CPT | Performed by: INTERNAL MEDICINE

## 2022-05-28 PROCEDURE — 25010000002 HEPARIN (PORCINE) PER 1000 UNITS: Performed by: INTERNAL MEDICINE

## 2022-05-28 PROCEDURE — 99285 EMERGENCY DEPT VISIT HI MDM: CPT

## 2022-05-28 PROCEDURE — 71045 X-RAY EXAM CHEST 1 VIEW: CPT

## 2022-05-28 PROCEDURE — 36600 WITHDRAWAL OF ARTERIAL BLOOD: CPT

## 2022-05-28 PROCEDURE — 94640 AIRWAY INHALATION TREATMENT: CPT

## 2022-05-28 PROCEDURE — 25010000002 METHYLPREDNISOLONE PER 125 MG: Performed by: INTERNAL MEDICINE

## 2022-05-28 PROCEDURE — 85007 BL SMEAR W/DIFF WBC COUNT: CPT | Performed by: EMERGENCY MEDICINE

## 2022-05-28 PROCEDURE — 93005 ELECTROCARDIOGRAM TRACING: CPT

## 2022-05-28 PROCEDURE — 25010000002 CEFTRIAXONE PER 250 MG: Performed by: EMERGENCY MEDICINE

## 2022-05-28 PROCEDURE — 25010000002 AZITHROMYCIN PER 500 MG: Performed by: INTERNAL MEDICINE

## 2022-05-28 PROCEDURE — 83605 ASSAY OF LACTIC ACID: CPT | Performed by: EMERGENCY MEDICINE

## 2022-05-28 PROCEDURE — 25010000002 MAGNESIUM SULFATE 2 GM/50ML SOLUTION: Performed by: EMERGENCY MEDICINE

## 2022-05-28 PROCEDURE — 0 IOPAMIDOL PER 1 ML: Performed by: INTERNAL MEDICINE

## 2022-05-28 PROCEDURE — 63710000001 INSULIN DETEMIR PER 5 UNITS: Performed by: INTERNAL MEDICINE

## 2022-05-28 PROCEDURE — 83735 ASSAY OF MAGNESIUM: CPT | Performed by: EMERGENCY MEDICINE

## 2022-05-28 PROCEDURE — 93010 ELECTROCARDIOGRAM REPORT: CPT | Performed by: INTERNAL MEDICINE

## 2022-05-28 PROCEDURE — 80053 COMPREHEN METABOLIC PANEL: CPT | Performed by: EMERGENCY MEDICINE

## 2022-05-28 RX ORDER — IPRATROPIUM BROMIDE AND ALBUTEROL SULFATE 2.5; .5 MG/3ML; MG/3ML
3 SOLUTION RESPIRATORY (INHALATION)
Status: DISCONTINUED | OUTPATIENT
Start: 2022-05-28 | End: 2022-05-31 | Stop reason: HOSPADM

## 2022-05-28 RX ORDER — HEPARIN SODIUM 5000 [USP'U]/ML
40 INJECTION, SOLUTION INTRAVENOUS; SUBCUTANEOUS AS NEEDED
Status: DISCONTINUED | OUTPATIENT
Start: 2022-05-28 | End: 2022-05-29

## 2022-05-28 RX ORDER — POTASSIUM CHLORIDE 750 MG/1
40 CAPSULE, EXTENDED RELEASE ORAL ONCE
Status: COMPLETED | OUTPATIENT
Start: 2022-05-28 | End: 2022-05-28

## 2022-05-28 RX ORDER — INSULIN ASPART 100 [IU]/ML
0-14 INJECTION, SOLUTION INTRAVENOUS; SUBCUTANEOUS
Status: DISCONTINUED | OUTPATIENT
Start: 2022-05-28 | End: 2022-05-31 | Stop reason: HOSPADM

## 2022-05-28 RX ORDER — ACETAMINOPHEN 160 MG/5ML
650 SOLUTION ORAL EVERY 4 HOURS PRN
Status: DISCONTINUED | OUTPATIENT
Start: 2022-05-28 | End: 2022-05-31 | Stop reason: HOSPADM

## 2022-05-28 RX ORDER — HEPARIN SODIUM 5000 [USP'U]/ML
80 INJECTION, SOLUTION INTRAVENOUS; SUBCUTANEOUS ONCE
Status: COMPLETED | OUTPATIENT
Start: 2022-05-28 | End: 2022-05-28

## 2022-05-28 RX ORDER — DEXTROSE MONOHYDRATE 25 G/50ML
25 INJECTION, SOLUTION INTRAVENOUS
Status: DISCONTINUED | OUTPATIENT
Start: 2022-05-28 | End: 2022-05-31 | Stop reason: HOSPADM

## 2022-05-28 RX ORDER — PANTOPRAZOLE SODIUM 40 MG/10ML
40 INJECTION, POWDER, LYOPHILIZED, FOR SOLUTION INTRAVENOUS
Status: DISCONTINUED | OUTPATIENT
Start: 2022-05-29 | End: 2022-05-31 | Stop reason: HOSPADM

## 2022-05-28 RX ORDER — TRAZODONE HYDROCHLORIDE 50 MG/1
50 TABLET ORAL NIGHTLY
COMMUNITY

## 2022-05-28 RX ORDER — ACETAMINOPHEN 325 MG/1
650 TABLET ORAL EVERY 4 HOURS PRN
Status: DISCONTINUED | OUTPATIENT
Start: 2022-05-28 | End: 2022-05-31 | Stop reason: HOSPADM

## 2022-05-28 RX ORDER — SODIUM CHLORIDE 0.9 % (FLUSH) 0.9 %
10 SYRINGE (ML) INJECTION AS NEEDED
Status: CANCELLED | OUTPATIENT
Start: 2022-05-28

## 2022-05-28 RX ORDER — ALBUTEROL SULFATE 2.5 MG/3ML
2.5 SOLUTION RESPIRATORY (INHALATION) EVERY 4 HOURS PRN
Status: DISCONTINUED | OUTPATIENT
Start: 2022-05-28 | End: 2022-05-31 | Stop reason: HOSPADM

## 2022-05-28 RX ORDER — SODIUM CHLORIDE 0.9 % (FLUSH) 0.9 %
10 SYRINGE (ML) INJECTION AS NEEDED
Status: DISCONTINUED | OUTPATIENT
Start: 2022-05-28 | End: 2022-05-31 | Stop reason: HOSPADM

## 2022-05-28 RX ORDER — LANOLIN ALCOHOL/MO/W.PET/CERES
5 CREAM (GRAM) TOPICAL NIGHTLY PRN
Status: DISCONTINUED | OUTPATIENT
Start: 2022-05-28 | End: 2022-05-31 | Stop reason: HOSPADM

## 2022-05-28 RX ORDER — CALCIUM CARBONATE 200(500)MG
1 TABLET,CHEWABLE ORAL 2 TIMES DAILY PRN
Status: DISCONTINUED | OUTPATIENT
Start: 2022-05-28 | End: 2022-05-31 | Stop reason: HOSPADM

## 2022-05-28 RX ORDER — NICOTINE POLACRILEX 4 MG
15 LOZENGE BUCCAL
Status: DISCONTINUED | OUTPATIENT
Start: 2022-05-28 | End: 2022-05-31 | Stop reason: HOSPADM

## 2022-05-28 RX ORDER — HEPARIN SODIUM 10000 [USP'U]/100ML
20 INJECTION, SOLUTION INTRAVENOUS
Status: DISCONTINUED | OUTPATIENT
Start: 2022-05-28 | End: 2022-05-29

## 2022-05-28 RX ORDER — SODIUM CHLORIDE 0.9 % (FLUSH) 0.9 %
10 SYRINGE (ML) INJECTION EVERY 12 HOURS SCHEDULED
Status: DISCONTINUED | OUTPATIENT
Start: 2022-05-28 | End: 2022-05-31 | Stop reason: HOSPADM

## 2022-05-28 RX ORDER — ONDANSETRON 2 MG/ML
4 INJECTION INTRAMUSCULAR; INTRAVENOUS EVERY 6 HOURS PRN
Status: DISCONTINUED | OUTPATIENT
Start: 2022-05-28 | End: 2022-05-31 | Stop reason: HOSPADM

## 2022-05-28 RX ORDER — HEPARIN SODIUM 5000 [USP'U]/ML
80 INJECTION, SOLUTION INTRAVENOUS; SUBCUTANEOUS AS NEEDED
Status: DISCONTINUED | OUTPATIENT
Start: 2022-05-28 | End: 2022-05-29

## 2022-05-28 RX ORDER — ENOXAPARIN SODIUM 100 MG/ML
40 INJECTION SUBCUTANEOUS EVERY 24 HOURS
Status: DISCONTINUED | OUTPATIENT
Start: 2022-05-28 | End: 2022-05-28

## 2022-05-28 RX ORDER — MAGNESIUM SULFATE HEPTAHYDRATE 40 MG/ML
2 INJECTION, SOLUTION INTRAVENOUS ONCE
Status: COMPLETED | OUTPATIENT
Start: 2022-05-28 | End: 2022-05-28

## 2022-05-28 RX ORDER — ACETAMINOPHEN 650 MG/1
650 SUPPOSITORY RECTAL EVERY 4 HOURS PRN
Status: DISCONTINUED | OUTPATIENT
Start: 2022-05-28 | End: 2022-05-31 | Stop reason: HOSPADM

## 2022-05-28 RX ORDER — METHYLPREDNISOLONE SODIUM SUCCINATE 125 MG/2ML
60 INJECTION, POWDER, LYOPHILIZED, FOR SOLUTION INTRAMUSCULAR; INTRAVENOUS EVERY 6 HOURS
Status: COMPLETED | OUTPATIENT
Start: 2022-05-28 | End: 2022-05-29

## 2022-05-28 RX ADMIN — METHYLPREDNISOLONE SODIUM SUCCINATE 60 MG: 125 INJECTION, POWDER, FOR SOLUTION INTRAMUSCULAR; INTRAVENOUS at 23:36

## 2022-05-28 RX ADMIN — CEFTRIAXONE SODIUM 1 G: 1 INJECTION, POWDER, FOR SOLUTION INTRAMUSCULAR; INTRAVENOUS at 16:49

## 2022-05-28 RX ADMIN — HEPARIN SODIUM 18 UNITS/KG/HR: 10000 INJECTION, SOLUTION INTRAVENOUS at 19:43

## 2022-05-28 RX ADMIN — IOPAMIDOL 53 ML: 755 INJECTION, SOLUTION INTRAVENOUS at 18:04

## 2022-05-28 RX ADMIN — INSULIN DETEMIR 18 UNITS: 100 INJECTION, SOLUTION SUBCUTANEOUS at 21:06

## 2022-05-28 RX ADMIN — POTASSIUM CHLORIDE 40 MEQ: 750 CAPSULE, EXTENDED RELEASE ORAL at 17:01

## 2022-05-28 RX ADMIN — HEPARIN SODIUM 5300 UNITS: 5000 INJECTION, SOLUTION INTRAVENOUS; SUBCUTANEOUS at 19:42

## 2022-05-28 RX ADMIN — MAGNESIUM SULFATE HEPTAHYDRATE 2 G: 40 INJECTION, SOLUTION INTRAVENOUS at 16:48

## 2022-05-28 RX ADMIN — METHYLPREDNISOLONE SODIUM SUCCINATE 60 MG: 125 INJECTION, POWDER, FOR SOLUTION INTRAMUSCULAR; INTRAVENOUS at 17:30

## 2022-05-28 RX ADMIN — IPRATROPIUM BROMIDE AND ALBUTEROL SULFATE 3 ML: 2.5; .5 SOLUTION RESPIRATORY (INHALATION) at 20:05

## 2022-05-28 RX ADMIN — AZITHROMYCIN MONOHYDRATE 500 MG: 500 INJECTION, POWDER, LYOPHILIZED, FOR SOLUTION INTRAVENOUS at 19:40

## 2022-05-29 ENCOUNTER — APPOINTMENT (OUTPATIENT)
Dept: CARDIOLOGY | Facility: HOSPITAL | Age: 47
End: 2022-05-29

## 2022-05-29 LAB
ANION GAP SERPL CALCULATED.3IONS-SCNC: 13 MMOL/L (ref 5–15)
APTT PPP: 42.5 SECONDS (ref 20–40.3)
APTT PPP: 67.6 SECONDS (ref 20–40.3)
BASOPHILS # BLD AUTO: 0.06 10*3/MM3 (ref 0–0.2)
BASOPHILS NFR BLD AUTO: 0.3 % (ref 0–1.5)
BH CV ECHO MEAS - ACS: 1.83 CM
BH CV ECHO MEAS - AO MAX PG: 8.9 MMHG
BH CV ECHO MEAS - AO MEAN PG: 5.1 MMHG
BH CV ECHO MEAS - AO ROOT DIAM: 3.5 CM
BH CV ECHO MEAS - AO V2 MAX: 149.4 CM/SEC
BH CV ECHO MEAS - AO V2 VTI: 23.2 CM
BH CV ECHO MEAS - AVA(I,D): 2.6 CM2
BH CV ECHO MEAS - EDV(CUBED): 164.8 ML
BH CV ECHO MEAS - EDV(MOD-SP2): 90.1 ML
BH CV ECHO MEAS - EDV(MOD-SP4): 98.1 ML
BH CV ECHO MEAS - EF(MOD-BP): 66.3 %
BH CV ECHO MEAS - EF(MOD-SP2): 67.8 %
BH CV ECHO MEAS - EF(MOD-SP4): 64.2 %
BH CV ECHO MEAS - ESV(CUBED): 48.5 ML
BH CV ECHO MEAS - ESV(MOD-SP2): 29 ML
BH CV ECHO MEAS - ESV(MOD-SP4): 35.1 ML
BH CV ECHO MEAS - FS: 33.5 %
BH CV ECHO MEAS - IVS/LVPW: 1.19 CM
BH CV ECHO MEAS - IVSD: 0.96 CM
BH CV ECHO MEAS - LA DIMENSION: 3.7 CM
BH CV ECHO MEAS - LAT PEAK E' VEL: 10.8 CM/SEC
BH CV ECHO MEAS - LV DIASTOLIC VOL/BSA (35-75): 55.9 CM2
BH CV ECHO MEAS - LV MASS(C)D: 181.3 GRAMS
BH CV ECHO MEAS - LV MAX PG: 5.4 MMHG
BH CV ECHO MEAS - LV MEAN PG: 3.2 MMHG
BH CV ECHO MEAS - LV SYSTOLIC VOL/BSA (12-30): 20 CM2
BH CV ECHO MEAS - LV V1 MAX: 115.7 CM/SEC
BH CV ECHO MEAS - LV V1 VTI: 17.4 CM
BH CV ECHO MEAS - LVIDD: 5.5 CM
BH CV ECHO MEAS - LVIDS: 3.6 CM
BH CV ECHO MEAS - LVOT AREA: 3.4 CM2
BH CV ECHO MEAS - LVOT DIAM: 2.08 CM
BH CV ECHO MEAS - LVPWD: 0.81 CM
BH CV ECHO MEAS - MED PEAK E' VEL: 7.2 CM/SEC
BH CV ECHO MEAS - MR MAX PG: 100.7 MMHG
BH CV ECHO MEAS - MR MAX VEL: 501.7 CM/SEC
BH CV ECHO MEAS - MV A MAX VEL: 121.8 CM/SEC
BH CV ECHO MEAS - MV DEC SLOPE: 733.7 CM/SEC2
BH CV ECHO MEAS - MV E MAX VEL: 102 CM/SEC
BH CV ECHO MEAS - MV E/A: 0.84
BH CV ECHO MEAS - MV MAX PG: 9.9 MMHG
BH CV ECHO MEAS - MV MEAN PG: 3 MMHG
BH CV ECHO MEAS - MV P1/2T: 38.8 MSEC
BH CV ECHO MEAS - MV V2 VTI: 21 CM
BH CV ECHO MEAS - MVA(P1/2T): 5.7 CM2
BH CV ECHO MEAS - MVA(VTI): 2.8 CM2
BH CV ECHO MEAS - PA V2 MAX: 122.2 CM/SEC
BH CV ECHO MEAS - RAP SYSTOLE: 5 MMHG
BH CV ECHO MEAS - RVDD: 2.24 CM
BH CV ECHO MEAS - SI(MOD-SP2): 34.8 ML/M2
BH CV ECHO MEAS - SI(MOD-SP4): 35.9 ML/M2
BH CV ECHO MEAS - SV(LVOT): 59.3 ML
BH CV ECHO MEAS - SV(MOD-SP2): 61.1 ML
BH CV ECHO MEAS - SV(MOD-SP4): 63 ML
BH CV ECHO MEASUREMENTS AVERAGE E/E' RATIO: 11.33
BUN SERPL-MCNC: 23 MG/DL (ref 6–20)
BUN/CREAT SERPL: 51.1 (ref 7–25)
CALCIUM SPEC-SCNC: 8 MG/DL (ref 8.6–10.5)
CHLORIDE SERPL-SCNC: 105 MMOL/L (ref 98–107)
CO2 SERPL-SCNC: 21 MMOL/L (ref 22–29)
CREAT SERPL-MCNC: 0.45 MG/DL (ref 0.57–1)
DEPRECATED RDW RBC AUTO: 49.5 FL (ref 37–54)
EGFRCR SERPLBLD CKD-EPI 2021: 121.8 ML/MIN/1.73
EOSINOPHIL # BLD AUTO: 0 10*3/MM3 (ref 0–0.4)
EOSINOPHIL NFR BLD AUTO: 0 % (ref 0.3–6.2)
ERYTHROCYTE [DISTWIDTH] IN BLOOD BY AUTOMATED COUNT: 18.6 % (ref 12.3–15.4)
GLUCOSE BLDC GLUCOMTR-MCNC: 193 MG/DL (ref 70–130)
GLUCOSE BLDC GLUCOMTR-MCNC: 203 MG/DL (ref 70–130)
GLUCOSE BLDC GLUCOMTR-MCNC: 215 MG/DL (ref 70–130)
GLUCOSE BLDC GLUCOMTR-MCNC: 247 MG/DL (ref 70–130)
GLUCOSE SERPL-MCNC: 176 MG/DL (ref 65–99)
HCT VFR BLD AUTO: 32.5 % (ref 34–46.6)
HGB BLD-MCNC: 10.9 G/DL (ref 12–15.9)
IMM GRANULOCYTES # BLD AUTO: 0.34 10*3/MM3 (ref 0–0.05)
IMM GRANULOCYTES NFR BLD AUTO: 1.9 % (ref 0–0.5)
LV EF 2D ECHO EST: 61 %
LYMPHOCYTES # BLD AUTO: 0.72 10*3/MM3 (ref 0.7–3.1)
LYMPHOCYTES NFR BLD AUTO: 4 % (ref 19.6–45.3)
MAGNESIUM SERPL-MCNC: 2.1 MG/DL (ref 1.6–2.6)
MAXIMAL PREDICTED HEART RATE: 176 BPM
MCH RBC QN AUTO: 24.9 PG (ref 26.6–33)
MCHC RBC AUTO-ENTMCNC: 33.5 G/DL (ref 31.5–35.7)
MCV RBC AUTO: 74.4 FL (ref 79–97)
MONOCYTES # BLD AUTO: 0.1 10*3/MM3 (ref 0.1–0.9)
MONOCYTES NFR BLD AUTO: 0.6 % (ref 5–12)
NEUTROPHILS NFR BLD AUTO: 16.86 10*3/MM3 (ref 1.7–7)
NEUTROPHILS NFR BLD AUTO: 93.2 % (ref 42.7–76)
NRBC BLD AUTO-RTO: 0.1 /100 WBC (ref 0–0.2)
PLATELET # BLD AUTO: 295 10*3/MM3 (ref 140–450)
PMV BLD AUTO: 8.4 FL (ref 6–12)
POTASSIUM SERPL-SCNC: 4.3 MMOL/L (ref 3.5–5.2)
QT INTERVAL: 344 MS
QTC INTERVAL: 486 MS
RBC # BLD AUTO: 4.37 10*6/MM3 (ref 3.77–5.28)
SODIUM SERPL-SCNC: 139 MMOL/L (ref 136–145)
STRESS TARGET HR: 150 BPM
WBC NRBC COR # BLD: 18.08 10*3/MM3 (ref 3.4–10.8)

## 2022-05-29 PROCEDURE — 94799 UNLISTED PULMONARY SVC/PX: CPT

## 2022-05-29 PROCEDURE — 93306 TTE W/DOPPLER COMPLETE: CPT | Performed by: INTERNAL MEDICINE

## 2022-05-29 PROCEDURE — 63710000001 INSULIN ASPART PER 5 UNITS: Performed by: INTERNAL MEDICINE

## 2022-05-29 PROCEDURE — 25010000002 AZITHROMYCIN PER 500 MG: Performed by: INTERNAL MEDICINE

## 2022-05-29 PROCEDURE — 85025 COMPLETE CBC W/AUTO DIFF WBC: CPT | Performed by: INTERNAL MEDICINE

## 2022-05-29 PROCEDURE — 25010000002 METHYLPREDNISOLONE PER 125 MG: Performed by: INTERNAL MEDICINE

## 2022-05-29 PROCEDURE — 85730 THROMBOPLASTIN TIME PARTIAL: CPT | Performed by: INTERNAL MEDICINE

## 2022-05-29 PROCEDURE — 93306 TTE W/DOPPLER COMPLETE: CPT

## 2022-05-29 PROCEDURE — 94760 N-INVAS EAR/PLS OXIMETRY 1: CPT

## 2022-05-29 PROCEDURE — 25010000002 CEFTRIAXONE PER 250 MG: Performed by: INTERNAL MEDICINE

## 2022-05-29 PROCEDURE — 25010000002 HEPARIN (PORCINE) PER 1000 UNITS: Performed by: INTERNAL MEDICINE

## 2022-05-29 PROCEDURE — 83735 ASSAY OF MAGNESIUM: CPT | Performed by: INTERNAL MEDICINE

## 2022-05-29 PROCEDURE — 36415 COLL VENOUS BLD VENIPUNCTURE: CPT | Performed by: INTERNAL MEDICINE

## 2022-05-29 PROCEDURE — 80048 BASIC METABOLIC PNL TOTAL CA: CPT | Performed by: INTERNAL MEDICINE

## 2022-05-29 PROCEDURE — 82962 GLUCOSE BLOOD TEST: CPT

## 2022-05-29 PROCEDURE — 63710000001 INSULIN DETEMIR PER 5 UNITS: Performed by: INTERNAL MEDICINE

## 2022-05-29 RX ADMIN — METHYLPREDNISOLONE SODIUM SUCCINATE 60 MG: 125 INJECTION, POWDER, FOR SOLUTION INTRAMUSCULAR; INTRAVENOUS at 18:12

## 2022-05-29 RX ADMIN — IPRATROPIUM BROMIDE AND ALBUTEROL SULFATE 3 ML: 2.5; .5 SOLUTION RESPIRATORY (INHALATION) at 08:03

## 2022-05-29 RX ADMIN — INSULIN ASPART 5 UNITS: 100 INJECTION, SOLUTION INTRAVENOUS; SUBCUTANEOUS at 13:42

## 2022-05-29 RX ADMIN — METHYLPREDNISOLONE SODIUM SUCCINATE 60 MG: 125 INJECTION, POWDER, FOR SOLUTION INTRAMUSCULAR; INTRAVENOUS at 05:48

## 2022-05-29 RX ADMIN — INSULIN ASPART 7 UNITS: 100 INJECTION, SOLUTION INTRAVENOUS; SUBCUTANEOUS at 18:39

## 2022-05-29 RX ADMIN — PANTOPRAZOLE SODIUM 40 MG: 40 INJECTION, POWDER, FOR SOLUTION INTRAVENOUS at 05:48

## 2022-05-29 RX ADMIN — CEFTRIAXONE SODIUM 2 G: 2 INJECTION, POWDER, FOR SOLUTION INTRAMUSCULAR; INTRAVENOUS at 16:36

## 2022-05-29 RX ADMIN — INSULIN DETEMIR 18 UNITS: 100 INJECTION, SOLUTION SUBCUTANEOUS at 08:40

## 2022-05-29 RX ADMIN — INSULIN ASPART 5 UNITS: 100 INJECTION, SOLUTION INTRAVENOUS; SUBCUTANEOUS at 08:39

## 2022-05-29 RX ADMIN — Medication 10 ML: at 20:47

## 2022-05-29 RX ADMIN — APIXABAN 10 MG: 5 TABLET, FILM COATED ORAL at 20:46

## 2022-05-29 RX ADMIN — INSULIN DETEMIR 20 UNITS: 100 INJECTION, SOLUTION SUBCUTANEOUS at 20:57

## 2022-05-29 RX ADMIN — METHYLPREDNISOLONE SODIUM SUCCINATE 60 MG: 125 INJECTION, POWDER, FOR SOLUTION INTRAMUSCULAR; INTRAVENOUS at 23:33

## 2022-05-29 RX ADMIN — APIXABAN 10 MG: 5 TABLET, FILM COATED ORAL at 10:11

## 2022-05-29 RX ADMIN — HEPARIN SODIUM 2700 UNITS: 5000 INJECTION, SOLUTION INTRAVENOUS; SUBCUTANEOUS at 03:25

## 2022-05-29 RX ADMIN — IPRATROPIUM BROMIDE AND ALBUTEROL SULFATE 3 ML: 2.5; .5 SOLUTION RESPIRATORY (INHALATION) at 12:29

## 2022-05-29 RX ADMIN — AZITHROMYCIN MONOHYDRATE 500 MG: 500 INJECTION, POWDER, LYOPHILIZED, FOR SOLUTION INTRAVENOUS at 17:44

## 2022-05-29 RX ADMIN — IPRATROPIUM BROMIDE AND ALBUTEROL SULFATE 3 ML: 2.5; .5 SOLUTION RESPIRATORY (INHALATION) at 20:31

## 2022-05-29 RX ADMIN — METHYLPREDNISOLONE SODIUM SUCCINATE 60 MG: 125 INJECTION, POWDER, FOR SOLUTION INTRAMUSCULAR; INTRAVENOUS at 13:43

## 2022-05-30 LAB
ANION GAP SERPL CALCULATED.3IONS-SCNC: 8 MMOL/L (ref 5–15)
BUN SERPL-MCNC: 25 MG/DL (ref 6–20)
BUN/CREAT SERPL: 51 (ref 7–25)
CALCIUM SPEC-SCNC: 8.7 MG/DL (ref 8.6–10.5)
CHLORIDE SERPL-SCNC: 104 MMOL/L (ref 98–107)
CO2 SERPL-SCNC: 27 MMOL/L (ref 22–29)
CREAT SERPL-MCNC: 0.49 MG/DL (ref 0.57–1)
EGFRCR SERPLBLD CKD-EPI 2021: 119.4 ML/MIN/1.73
GLUCOSE BLDC GLUCOMTR-MCNC: 169 MG/DL (ref 70–130)
GLUCOSE BLDC GLUCOMTR-MCNC: 193 MG/DL (ref 70–130)
GLUCOSE BLDC GLUCOMTR-MCNC: 230 MG/DL (ref 70–130)
GLUCOSE BLDC GLUCOMTR-MCNC: 88 MG/DL (ref 70–130)
GLUCOSE SERPL-MCNC: 199 MG/DL (ref 65–99)
MAGNESIUM SERPL-MCNC: 2 MG/DL (ref 1.6–2.6)
POTASSIUM SERPL-SCNC: 4.4 MMOL/L (ref 3.5–5.2)
SODIUM SERPL-SCNC: 139 MMOL/L (ref 136–145)
WBC # BLD AUTO: 21.29 10*3/MM3 (ref 3.4–10.8)

## 2022-05-30 PROCEDURE — 94760 N-INVAS EAR/PLS OXIMETRY 1: CPT

## 2022-05-30 PROCEDURE — 85048 AUTOMATED LEUKOCYTE COUNT: CPT | Performed by: INTERNAL MEDICINE

## 2022-05-30 PROCEDURE — 36415 COLL VENOUS BLD VENIPUNCTURE: CPT | Performed by: INTERNAL MEDICINE

## 2022-05-30 PROCEDURE — 25010000002 CEFTRIAXONE PER 250 MG: Performed by: INTERNAL MEDICINE

## 2022-05-30 PROCEDURE — 63710000001 INSULIN ASPART PER 5 UNITS: Performed by: INTERNAL MEDICINE

## 2022-05-30 PROCEDURE — 25010000002 AZITHROMYCIN PER 500 MG: Performed by: INTERNAL MEDICINE

## 2022-05-30 PROCEDURE — 94799 UNLISTED PULMONARY SVC/PX: CPT

## 2022-05-30 PROCEDURE — 83735 ASSAY OF MAGNESIUM: CPT | Performed by: INTERNAL MEDICINE

## 2022-05-30 PROCEDURE — 63710000001 INSULIN DETEMIR PER 5 UNITS: Performed by: INTERNAL MEDICINE

## 2022-05-30 PROCEDURE — 82962 GLUCOSE BLOOD TEST: CPT

## 2022-05-30 PROCEDURE — 63710000001 PREDNISONE PER 5 MG: Performed by: INTERNAL MEDICINE

## 2022-05-30 PROCEDURE — 80048 BASIC METABOLIC PNL TOTAL CA: CPT | Performed by: INTERNAL MEDICINE

## 2022-05-30 RX ORDER — PREDNISONE 1 MG/1
5 TABLET ORAL
Status: DISCONTINUED | OUTPATIENT
Start: 2022-05-30 | End: 2022-05-31 | Stop reason: HOSPADM

## 2022-05-30 RX ADMIN — PANTOPRAZOLE SODIUM 40 MG: 40 INJECTION, POWDER, FOR SOLUTION INTRAVENOUS at 05:30

## 2022-05-30 RX ADMIN — Medication 10 ML: at 08:43

## 2022-05-30 RX ADMIN — INSULIN ASPART 5 UNITS: 100 INJECTION, SOLUTION INTRAVENOUS; SUBCUTANEOUS at 05:34

## 2022-05-30 RX ADMIN — INSULIN ASPART 5 UNITS: 100 INJECTION, SOLUTION INTRAVENOUS; SUBCUTANEOUS at 10:52

## 2022-05-30 RX ADMIN — CEFTRIAXONE SODIUM 2 G: 2 INJECTION, POWDER, FOR SOLUTION INTRAMUSCULAR; INTRAVENOUS at 17:07

## 2022-05-30 RX ADMIN — IPRATROPIUM BROMIDE AND ALBUTEROL SULFATE 3 ML: 2.5; .5 SOLUTION RESPIRATORY (INHALATION) at 18:53

## 2022-05-30 RX ADMIN — PREDNISONE 5 MG: 5 TABLET ORAL at 14:03

## 2022-05-30 RX ADMIN — ACETAMINOPHEN 650 MG: 325 TABLET, FILM COATED ORAL at 15:29

## 2022-05-30 RX ADMIN — APIXABAN 10 MG: 5 TABLET, FILM COATED ORAL at 08:42

## 2022-05-30 RX ADMIN — AZITHROMYCIN MONOHYDRATE 500 MG: 500 INJECTION, POWDER, LYOPHILIZED, FOR SOLUTION INTRAVENOUS at 17:07

## 2022-05-30 RX ADMIN — INSULIN DETEMIR 20 UNITS: 100 INJECTION, SOLUTION SUBCUTANEOUS at 08:44

## 2022-05-30 RX ADMIN — INSULIN DETEMIR 22 UNITS: 100 INJECTION, SOLUTION SUBCUTANEOUS at 21:14

## 2022-05-30 RX ADMIN — Medication 10 ML: at 21:00

## 2022-05-30 RX ADMIN — APIXABAN 10 MG: 5 TABLET, FILM COATED ORAL at 21:13

## 2022-05-30 RX ADMIN — IPRATROPIUM BROMIDE AND ALBUTEROL SULFATE 3 ML: 2.5; .5 SOLUTION RESPIRATORY (INHALATION) at 07:09

## 2022-05-30 RX ADMIN — MELATONIN 5.25 MG: 3 TAB ORAL at 21:13

## 2022-05-31 VITALS
WEIGHT: 152.78 LBS | SYSTOLIC BLOOD PRESSURE: 136 MMHG | DIASTOLIC BLOOD PRESSURE: 86 MMHG | BODY MASS INDEX: 24.55 KG/M2 | HEIGHT: 66 IN | RESPIRATION RATE: 20 BRPM | TEMPERATURE: 98.8 F | HEART RATE: 100 BPM | OXYGEN SATURATION: 96 %

## 2022-05-31 LAB
AMPHET+METHAMPHET UR QL: NEGATIVE
AMPHETAMINES UR QL: NEGATIVE
ANION GAP SERPL CALCULATED.3IONS-SCNC: 9 MMOL/L (ref 5–15)
BARBITURATES UR QL SCN: NEGATIVE
BENZODIAZ UR QL SCN: NEGATIVE
BUN SERPL-MCNC: 21 MG/DL (ref 6–20)
BUN/CREAT SERPL: 43.8 (ref 7–25)
BUPRENORPHINE SERPL-MCNC: POSITIVE NG/ML
CALCIUM SPEC-SCNC: 8.6 MG/DL (ref 8.6–10.5)
CANNABINOIDS SERPL QL: NEGATIVE
CHLORIDE SERPL-SCNC: 104 MMOL/L (ref 98–107)
CO2 SERPL-SCNC: 30 MMOL/L (ref 22–29)
COCAINE UR QL: NEGATIVE
CREAT SERPL-MCNC: 0.48 MG/DL (ref 0.57–1)
EGFRCR SERPLBLD CKD-EPI 2021: 120 ML/MIN/1.73
GLUCOSE BLDC GLUCOMTR-MCNC: 94 MG/DL (ref 70–130)
GLUCOSE SERPL-MCNC: 93 MG/DL (ref 65–99)
MAGNESIUM SERPL-MCNC: 1.7 MG/DL (ref 1.6–2.6)
METHADONE UR QL SCN: NEGATIVE
OPIATES UR QL: NEGATIVE
OXYCODONE UR QL SCN: NEGATIVE
PCP UR QL SCN: NEGATIVE
PHOSPHATE SERPL-MCNC: 3.3 MG/DL (ref 2.5–4.5)
POTASSIUM SERPL-SCNC: 3.4 MMOL/L (ref 3.5–5.2)
POTASSIUM SERPL-SCNC: 3.5 MMOL/L (ref 3.5–5.2)
POTASSIUM SERPL-SCNC: 3.5 MMOL/L (ref 3.5–5.2)
PROPOXYPH UR QL: NEGATIVE
SODIUM SERPL-SCNC: 143 MMOL/L (ref 136–145)
TRICYCLICS UR QL SCN: NEGATIVE

## 2022-05-31 PROCEDURE — 80048 BASIC METABOLIC PNL TOTAL CA: CPT | Performed by: INTERNAL MEDICINE

## 2022-05-31 PROCEDURE — 83735 ASSAY OF MAGNESIUM: CPT | Performed by: INTERNAL MEDICINE

## 2022-05-31 PROCEDURE — 84132 ASSAY OF SERUM POTASSIUM: CPT | Performed by: NURSE PRACTITIONER

## 2022-05-31 PROCEDURE — 63710000001 INSULIN DETEMIR PER 5 UNITS: Performed by: INTERNAL MEDICINE

## 2022-05-31 PROCEDURE — 82962 GLUCOSE BLOOD TEST: CPT

## 2022-05-31 PROCEDURE — 84100 ASSAY OF PHOSPHORUS: CPT | Performed by: NURSE PRACTITIONER

## 2022-05-31 PROCEDURE — 80306 DRUG TEST PRSMV INSTRMNT: CPT | Performed by: INTERNAL MEDICINE

## 2022-05-31 PROCEDURE — 0 MAGNESIUM SULFATE 4 GM/100ML SOLUTION: Performed by: NURSE PRACTITIONER

## 2022-05-31 PROCEDURE — 63710000001 PREDNISONE PER 5 MG: Performed by: INTERNAL MEDICINE

## 2022-05-31 RX ORDER — MAGNESIUM SULFATE HEPTAHYDRATE 40 MG/ML
4 INJECTION, SOLUTION INTRAVENOUS AS NEEDED
Status: DISCONTINUED | OUTPATIENT
Start: 2022-05-31 | End: 2022-05-31 | Stop reason: HOSPADM

## 2022-05-31 RX ORDER — MAGNESIUM SULFATE HEPTAHYDRATE 40 MG/ML
2 INJECTION, SOLUTION INTRAVENOUS AS NEEDED
Status: DISCONTINUED | OUTPATIENT
Start: 2022-05-31 | End: 2022-05-31 | Stop reason: HOSPADM

## 2022-05-31 RX ORDER — POTASSIUM CHLORIDE 1.5 G/1.77G
40 POWDER, FOR SOLUTION ORAL AS NEEDED
Status: DISCONTINUED | OUTPATIENT
Start: 2022-05-31 | End: 2022-05-31 | Stop reason: HOSPADM

## 2022-05-31 RX ORDER — POTASSIUM CHLORIDE 7.45 MG/ML
10 INJECTION INTRAVENOUS
Status: DISCONTINUED | OUTPATIENT
Start: 2022-05-31 | End: 2022-05-31 | Stop reason: HOSPADM

## 2022-05-31 RX ORDER — AMOXICILLIN AND CLAVULANATE POTASSIUM 875; 125 MG/1; MG/1
1 TABLET, FILM COATED ORAL 2 TIMES DAILY
Qty: 14 TABLET | Refills: 0 | Status: SHIPPED | OUTPATIENT
Start: 2022-05-31 | End: 2022-06-07

## 2022-05-31 RX ORDER — POTASSIUM CHLORIDE 750 MG/1
40 CAPSULE, EXTENDED RELEASE ORAL AS NEEDED
Status: DISCONTINUED | OUTPATIENT
Start: 2022-05-31 | End: 2022-05-31 | Stop reason: HOSPADM

## 2022-05-31 RX ADMIN — ACETAMINOPHEN 650 MG: 325 TABLET, FILM COATED ORAL at 05:20

## 2022-05-31 RX ADMIN — PANTOPRAZOLE SODIUM 40 MG: 40 INJECTION, POWDER, FOR SOLUTION INTRAVENOUS at 08:18

## 2022-05-31 RX ADMIN — APIXABAN 10 MG: 5 TABLET, FILM COATED ORAL at 08:19

## 2022-05-31 RX ADMIN — ACETAMINOPHEN 650 MG: 325 TABLET, FILM COATED ORAL at 01:06

## 2022-05-31 RX ADMIN — Medication 10 ML: at 08:20

## 2022-05-31 RX ADMIN — PREDNISONE 5 MG: 5 TABLET ORAL at 08:19

## 2022-05-31 RX ADMIN — INSULIN DETEMIR 22 UNITS: 100 INJECTION, SOLUTION SUBCUTANEOUS at 08:24

## 2022-05-31 RX ADMIN — POTASSIUM CHLORIDE 40 MEQ: 750 CAPSULE, EXTENDED RELEASE ORAL at 11:58

## 2022-05-31 RX ADMIN — POTASSIUM CHLORIDE 40 MEQ: 750 CAPSULE, EXTENDED RELEASE ORAL at 06:22

## 2022-05-31 RX ADMIN — MAGNESIUM SULFATE 4 G: 4 INJECTION INTRAVENOUS at 08:20

## 2022-06-01 ENCOUNTER — READMISSION MANAGEMENT (OUTPATIENT)
Dept: CALL CENTER | Facility: HOSPITAL | Age: 47
End: 2022-06-01

## 2022-06-01 NOTE — OUTREACH NOTE
Prep Survey    Flowsheet Row Responses   [facility]? [facility]   Is LACE score < 7 ? No   Emergency Room discharge w/ pulse ox? No   Eligibility Readm Mgmt   Discharge diagnosis Hypokalemia  Acute respiratory failure with hypoxia Pneumonia of both lungs due to infectious organism   Does the patient have one of the following disease processes/diagnoses(primary or secondary)? COPD/Pneumonia   Does the patient have Home health ordered? No   Is there a DME ordered? Yes   What DME was ordered? [employer]    Prep survey completed? Yes          APRIL MIR - Registered Nurse

## 2022-06-01 NOTE — PAYOR COMM NOTE
"Montserrat Negronmore  [facility]  Case Management Extender  [phone] phone  [phone] fax      Ref# [id number]    Gely Jones (46 y.o. Female)             Date of Birth   1975    Social Security Number       Address   [address] [place] [address]  [facility] [address]    Home Phone   [phone]    MRN   [id number]       Christianity   None    Marital Status   Single                            Admission Date   5/28/22    Admission Type   Emergency    Admitting Provider   Андрей Childs MD    Attending Provider       Department, Room/Bed   [facility] CRITICAL CARE STEPDOWN, 11/A       Discharge Date   5/31/2022    Discharge Disposition   Home or Self Care    Discharge Destination                               Attending Provider: (none)   Allergies: Adhesive Tape, Bactrim [Sulfamethoxazole-trimethoprim], Ciprofloxacin, Latex    Isolation: None   Infection: None   Code Status: Prior   Advance Care Planning Activity    Ht: 167.6 cm (65.98\")   Wt: 69.3 kg (152 lb 12.5 oz)    Admission Cmt: None   Principal Problem: None                Active Insurance as of 5/28/2022     Primary Coverage     Payor Plan Insurance Group Employer/Plan Group    ANTHEM MEDICARE REPLACEMENT ANTHEM MEDICARE ADVANTAGE [id number]     Payor Plan Address Payor Plan Phone Number Payor Plan Fax Number Effective Dates    [address] [phone]  1/1/2020 - None Entered    [facility] [address]       Subscriber Name Subscriber Birth Date Member ID       GELY JONES 1975 [id number]           Secondary Coverage     Payor Plan Insurance Group Employer/Plan Group    [place]      Payor Plan Address Payor Plan Phone Number Payor Plan Fax Number Effective Dates    [address] [phone]  6/1/2015 - None Entered    [facility] [id number]       Subscriber Name Subscriber Birth Date Member ID       GELY JONES KUNAL 1975 [id number]           " "      Emergency Contacts      (Rel.) Home Phone Work Phone Mobile Phone    Rea Justin (Sister) [phone] -- [phone]    Sumit Jones (Father) [phone] -- [phone]               Discharge Summary      Андрей Childs MD at 05/31/22 1225              [facility]  DISCHARGE SUMMARY       Date of Admission: 5/28/2022  Date of Discharge:  5/31/2022  Primary Care Physician: Parth Salas APRN    Presenting Problem/History of Present Illness:  Hypokalemia [E87.6]  Acute respiratory failure with hypoxia (HCC) [J96.01]  Pneumonia of both lungs due to infectious organism, unspecified part of lung [J18.9]  Sepsis without acute organ dysfunction, due to unspecified organism (HCC) [A41.9]   46-year-old patient with past medical history of Cushing's disease, depression, GERD, diabetes, hypertension, anemia presents with shortness of breath, weakness for 7 days.  Patient is a respiratory therapist, and noted pulse ox of 77% at home for few days.  Patient states she lives a fast-paced life, so waited before presenting to emergency room for evaluation of shortness of breath.  Patient found to be hypoxic on room air in emergency room, and subsequently admitted for hypoxia evaluation.  Previously seen at outpatient clinic, and diagnosed with pneumonia.  Patient currently on day 5 of outpatient doxycycline therapy.  Admits to productive greenish cough worsened over past 24 hours.  Denies fevers, chills, nausea, vomiting.  Has suffered from some palpitations over the last few days.  Patient admits to father with COVID-positive status recently \"but we have not been in contact in the last 3 days.  At baseline patient admits to taking 5 to 20 mg of prednisone per day.  Patient on 5 to 6 L nasal cannula in emergency room at time of evaluation by Dr. Childs in ER.  Patient was desaturating, so upgraded to high flow nasal cannula in emergency " room prior to admission.  Patient states last pneumonia onia hospitalization occurred when she was 22 years old, and did not require intubation.  Reports intubation last year secondary to perforated ulcer repair procedure.  Patient is an active smoker, and concerned she might possibly be suffering from COPD.        Final Discharge Diagnoses:  Active Hospital Problems    Diagnosis    • Acute respiratory failure with hypoxia (HCC)        Consults:   Consults     Date and Time Order Name Status Description    5/28/2022  4:43 PM Hospitalist (on-call MD unless specified)            Procedures Performed:                 Pertinent Test Results:   Lab Results (most recent)     Procedure Component Value Units Date/Time    Urine Drug Screen - Urine, Clean Catch [[id number]]  (Abnormal) Collected: 05/31/22 1158    Specimen: Urine, Clean Catch Updated: 05/31/22 1223     THC, Screen, Urine Negative     Phencyclidine (PCP), Urine Negative     Cocaine Screen, Urine Negative     Methamphetamine, Ur Negative     Opiate Screen Negative     Amphetamine Screen, Urine Negative     Benzodiazepine Screen, Urine Negative     Tricyclic Antidepressants Screen Negative     Methadone Screen, Urine Negative     Barbiturates Screen, Urine Negative     Oxycodone Screen, Urine Negative     Propoxyphene Screen Negative     Buprenorphine, Screen, Urine Positive    Narrative:      Cutoff For Drugs Screened:    Amphetamines               500 ng/ml  Barbiturates               200 ng/ml  Benzodiazepines            150 ng/ml  Cocaine                    150 ng/ml  Methadone                  200 ng/ml  Opiates                    100 ng/ml  Phencyclidine               25 ng/ml  THC                            50 ng/ml  Methamphetamine            500 ng/ml  Tricyclic Antidepressants  300 ng/ml  Oxycodone                  100 ng/ml  Propoxyphene               300 ng/ml  Buprenorphine               10 ng/ml    The normal value for all drugs tested is negative.  This report includes unconfirmed screening results, with the cutoff values listed, to be used for medical treatment purposes only.  Unconfirmed results must not be used for non-medical purposes such as employment or legal testing.  Clinical consideration should be applied to any drug of abuse test, particularly when unconfirmed results are used.      Potassium [[id number]]  (Normal) Collected: 05/31/22 0737    Specimen: Blood Updated: 05/31/22 0818     Potassium 3.5 mmol/L     Phosphorus [[id number]]  (Normal) Collected: 05/31/22 0358    Specimen: Blood Updated: 05/31/22 0733     Phosphorus 3.3 mg/dL     Potassium [[id number]]  (Normal) Collected: 05/31/22 0618    Specimen: Blood Updated: 05/31/22 0710     Potassium 3.5 mmol/L     Basic Metabolic Panel [[id number]]  (Abnormal) Collected: 05/31/22 0358    Specimen: Blood Updated: 05/31/22 0444     Glucose 93 mg/dL      BUN 21 mg/dL      Creatinine 0.48 mg/dL      Sodium 143 mmol/L      Potassium 3.4 mmol/L      Chloride 104 mmol/L      CO2 30.0 mmol/L      Calcium 8.6 mg/dL      BUN/Creatinine Ratio 43.8     Anion Gap 9.0 mmol/L      eGFR 120.0 mL/min/1.73      Comment: National Kidney Foundation and American Society of Nephrology (ASN) Task Force recommended calculation based on the Chronic Kidney Disease Epidemiology Collaboration (CKD-EPI) equation refit without adjustment for race.       Narrative:      GFR Normal >60  Chronic Kidney Disease <60  Kidney Failure <15      Magnesium [[id number]]  (Normal) Collected: 05/31/22 0358    Specimen: Blood Updated: 05/31/22 0444     Magnesium 1.7 mg/dL     POC Glucose Once [[id number]]  (Abnormal) Collected: 05/30/22 2101    Specimen: Blood Updated: 05/30/22 2112     Glucose 230 mg/dL      Comment: Result Not ConfirmedOperator: [id number] CASSIE DONAHUEMeter ID: [id number]       POC Glucose Once [[id number]]  (Normal) Collected: 05/30/22 1659    Specimen: Blood Updated: 05/30/22 1712     Glucose 88 mg/dL      Comment: :  [id number] [facility] LYNNETTEREYMeter ID: [id number]       Blood Culture - Blood, Arm, Left [[id number]]  (Normal) Collected: 05/28/22 1519    Specimen: Blood from Arm, Left Updated: 05/30/22 1531     Blood Culture No growth at 2 days    Blood Culture - Blood, Arm, Left [[id number]]  (Normal) Collected: 05/28/22 1449    Specimen: Blood from Arm, Left Updated: 05/30/22 1502     Blood Culture No growth at 2 days    WBC [[id number]]  (Abnormal) Collected: 05/30/22 1023    Specimen: Blood Updated: 05/30/22 1029     WBC 21.29 10*3/mm3     Basic Metabolic Panel [[id number]]  (Abnormal) Collected: 05/30/22 0524    Specimen: Blood Updated: 05/30/22 0615     Glucose 199 mg/dL      BUN 25 mg/dL      Creatinine 0.49 mg/dL      Sodium 139 mmol/L      Potassium 4.4 mmol/L      Comment: Slight hemolysis detected by analyzer. Results may be affected.        Chloride 104 mmol/L      CO2 27.0 mmol/L      Calcium 8.7 mg/dL      BUN/Creatinine Ratio 51.0     Anion Gap 8.0 mmol/L      eGFR 119.4 mL/min/1.73      Comment: National Kidney Foundation and American Society of Nephrology (ASN) Task Force recommended calculation based on the Chronic Kidney Disease Epidemiology Collaboration (CKD-EPI) equation refit without adjustment for race.       Narrative:      GFR Normal >60  Chronic Kidney Disease <60  Kidney Failure <15      Magnesium [[id number]]  (Normal) Collected: 05/30/22 0524    Specimen: Blood Updated: 05/30/22 0615     Magnesium 2.0 mg/dL     aPTT [[id number]]  (Abnormal) Collected: 05/29/22 0937    Specimen: Blood Updated: 05/29/22 1014     PTT 67.6 seconds     Narrative:      The recommended Heparin therapeutic range is 68-97 seconds.    aPTT [[id number]]  (Abnormal) Collected: 05/29/22 0221    Specimen: Blood Updated: 05/29/22 0318     PTT 42.5 seconds     Narrative:      The recommended Heparin therapeutic range is 68-97 seconds.    CBC & Differential [[id number]]  (Abnormal) Collected: 05/29/22 0221    Specimen: Blood Updated: 05/29/22  0244    Narrative:      The following orders were created for panel order CBC & Differential.  Procedure                               Abnormality         Status                     ---------                               -----------         ------                     CBC Auto Differential[[id number]]        Abnormal            Final result               Scan Slide[[id number]]                                                                    Please view results for these tests on the individual orders.    CBC Auto Differential [[id number]]  (Abnormal) Collected: 05/29/22 0221    Specimen: Blood Updated: 05/29/22 0243     WBC 18.08 10*3/mm3      RBC 4.37 10*6/mm3      Hemoglobin 10.9 g/dL      Hematocrit 32.5 %      MCV 74.4 fL      MCH 24.9 pg      MCHC 33.5 g/dL      RDW 18.6 %      RDW-SD 49.5 fl      MPV 8.4 fL      Platelets 295 10*3/mm3      Neutrophil % 93.2 %      Lymphocyte % 4.0 %      Monocyte % 0.6 %      Eosinophil % 0.0 %      Basophil % 0.3 %      Immature Grans % 1.9 %      Neutrophils, Absolute 16.86 10*3/mm3      Lymphocytes, Absolute 0.72 10*3/mm3      Monocytes, Absolute 0.10 10*3/mm3      Eosinophils, Absolute 0.00 10*3/mm3      Basophils, Absolute 0.06 10*3/mm3      Immature Grans, Absolute 0.34 10*3/mm3      nRBC 0.1 /100 WBC     Protime-INR [[id number]]  (Normal) Collected: 05/28/22 1931    Specimen: Blood Updated: 05/28/22 2022     Protime 14.8 Seconds      INR 1.18    Narrative:      Therapeutic range for most indications is 2.0-3.0 INR,  or 2.5-3.5 for mechanical heart valves.    Hemoglobin A1c [[id number]]  (Abnormal) Collected: 05/28/22 1449    Specimen: Blood Updated: 05/28/22 1745     Hemoglobin A1C 9.60 %     Narrative:      Hemoglobin A1C Ranges:    Increased Risk for Diabetes  5.7% to 6.4%  Diabetes                     >= 6.5%  Diabetic Goal                < 7.0%    Extra Tubes [[id number]] Collected: 05/28/22 1455    Specimen: Blood, Venous Line Updated: 05/28/22 1604    Narrative:       The following orders were created for panel order Extra Tubes.  Procedure                               Abnormality         Status                     ---------                               -----------         ------                     Gold Top - SST[[id number]]                                   Final result               Light Blue Top[[id number]]                                   Final result                 Please view results for these tests on the individual orders.    Gold Top - SST [[id number]] Collected: 05/28/22 1455    Specimen: Blood Updated: 05/28/22 1604     Extra Tube Hold for add-ons.     Comment: Auto resulted.       Light Blue Top [[id number]] Collected: 05/28/22 1455    Specimen: Blood Updated: 05/28/22 1604     Extra Tube Hold for add-ons.     Comment: Auto resulted       COVID-19 and FLU A/B PCR - Swab, Nasopharynx [[id number]]  (Normal) Collected: 05/28/22 1507    Specimen: Swab from Nasopharynx Updated: 05/28/22 1544     COVID19 Not Detected     Influenza A PCR Not Detected     Influenza B PCR Not Detected    Narrative:      Fact sheet for providers: https://www.fda.gov/media/382576/download    Fact sheet for patients: https://www.fda.gov/media/593636/download    Test performed by PCR.    Blood Gas, Arterial - [[id number]]  (Abnormal) Collected: 05/28/22 1539    Specimen: Arterial Blood Updated: 05/28/22 1541     Site Right Radial     Elia's Test N/A     pH, Arterial 7.367 pH units      pCO2, Arterial 40.4 mm Hg      pO2, Arterial 75.1 mm Hg      Comment: 84 Value below reference range        HCO3, Arterial 23.2 mmol/L      Base Excess, Arterial -2.0 mmol/L      Comment: 84 Value below reference range        O2 Saturation, Arterial 92.1 %      Comment: 84 Value below reference range        Barometric Pressure for Blood Gas 746 mmHg      Modality Nasal Cannula     FIO2 <21 %      Flow Rate 6.0 lpm      Ventilator Mode NA     Comment: Meter: [id number]     :  [id number]       Lactic  Acid, Plasma [[id number]]  (Normal) Collected: 05/28/22 1449    Specimen: Blood Updated: 05/28/22 1537     Lactate 1.9 mmol/L     CBC & Differential [[id number]]  (Abnormal) Collected: 05/28/22 1449    Specimen: Blood Updated: 05/28/22 1522    Narrative:      The following orders were created for panel order CBC & Differential.  Procedure                               Abnormality         Status                     ---------                               -----------         ------                     CBC Auto Differential[[id number]]        Abnormal            Final result               Scan Slide[[id number]]                                       Final result                 Please view results for these tests on the individual orders.    Scan Slide [[id number]] Collected: 05/28/22 1449    Specimen: Blood Updated: 05/28/22 1522     Anisocytosis Slight/1+     Hypochromia Slight/1+     Microcytes Slight/1+     WBC Morphology Normal     Platelet Morphology Normal    CBC Auto Differential [[id number]]  (Abnormal) Collected: 05/28/22 1449    Specimen: Blood Updated: 05/28/22 1520     WBC 19.94 10*3/mm3      RBC 4.66 10*6/mm3      Hemoglobin 11.5 g/dL      Hematocrit 34.9 %      MCV 74.9 fL      MCH 24.7 pg      MCHC 33.0 g/dL      RDW 18.9 %      RDW-SD 50.2 fl      MPV 8.4 fL      Platelets 413 10*3/mm3      Neutrophil % 76.9 %      Lymphocyte % 13.5 %      Monocyte % 2.5 %      Eosinophil % 3.6 %      Basophil % 0.4 %      Immature Grans % 3.1 %      Neutrophils, Absolute 15.34 10*3/mm3      Lymphocytes, Absolute 2.69 10*3/mm3      Monocytes, Absolute 0.49 10*3/mm3      Eosinophils, Absolute 0.72 10*3/mm3      Basophils, Absolute 0.08 10*3/mm3      Immature Grans, Absolute 0.62 10*3/mm3      nRBC 0.5 /100 WBC     Comprehensive Metabolic Panel [[id number]]  (Abnormal) Collected: 05/28/22 1449    Specimen: Blood Updated: 05/28/22 1518     Glucose 204 mg/dL      BUN 27 mg/dL      Creatinine 0.61 mg/dL      Sodium 135 mmol/L       Potassium 3.0 mmol/L      Chloride 101 mmol/L      CO2 19.0 mmol/L      Calcium 8.3 mg/dL      Total Protein 5.8 g/dL      Albumin 2.60 g/dL      ALT (SGPT) 11 U/L      AST (SGOT) 12 U/L      Alkaline Phosphatase 156 U/L      Total Bilirubin 0.2 mg/dL      Globulin 3.2 gm/dL      A/G Ratio 0.8 g/dL      BUN/Creatinine Ratio 44.3     Anion Gap 15.0 mmol/L      eGFR 113.2 mL/min/1.73      Comment: National Kidney Foundation and American Society of Nephrology (ASN) Task Force recommended calculation based on the Chronic Kidney Disease Epidemiology Collaboration (CKD-EPI) equation refit without adjustment for race.       Narrative:      GFR Normal >60  Chronic Kidney Disease <60  Kidney Failure <15      Troponin [[id number]]  (Normal) Collected: 05/28/22 1449    Specimen: Blood Updated: 05/28/22 1518     Troponin T <0.010 ng/mL     Narrative:      Troponin T Reference Range:  <= 0.03 ng/mL-   Negative for AMI  >0.03 ng/mL-     Abnormal for myocardial necrosis.  Clinicians would have to utilize clinical acumen, EKG, Troponin and serial changes to determine if it is an Acute Myocardial Infarction or myocardial injury due to an underlying chronic condition.       Results may be falsely decreased if patient taking Biotin.      BNP [[id number]]  (Abnormal) Collected: 05/28/22 1449    Specimen: Blood Updated: 05/28/22 1516     proBNP 493.4 pg/mL     Narrative:      Among patients with dyspnea, NT-proBNP is highly sensitive for the detection of acute congestive heart failure. In addition NT-proBNP of <300 pg/ml effectively rules out acute congestive heart failure with 99% negative predictive value.    Results may be falsely decreased if patient taking Biotin.          Imaging Results (Most Recent)     Procedure Component Value Units Date/Time    US Venous Doppler Lower Extremity Bilateral (duplex) [[id number]] Collected: 05/28/22 2001     Updated: 05/29/22 0742    Narrative:      Doppler duplex venous examination bilateral lower  extremity.         CLINICAL INDICATION: Pulmonary emboli.          COMPARISON: CTA chest May 28, 2022.     FINDINGS:    The common femoral,  femoral, and popliteal veins of the  bilateral     lower extremity are well identified and compress  normally.  Doppler signals are heard either spontaneously or on  distal compression.  No evidence of intraluminal thrombus was  noted.     Noncompressible thrombi in several posterior calf veins.      Impression:      No evidence of deep venous thrombosis in the common  femoral, femoral, or popliteal veins of the bilateral lower  extremities.    Noncompressible thrombi in several of the posterior calf veins.      Electronically signed by:  Cipriano Adams MD  5/29/2022 7:40 AM CDT  Workstation: [id number]    CT Chest With Contrast Diagnostic [[id number]] Collected: 05/28/22 1756     Updated: 05/28/22 1915    Addenda:         ADDENDUM   ADDENDUM #1       Findings were discussed with Dr Андрей Childs @ [address] [place] [address] 5/28/2022.    Electronically signed by:  Jocy Jesus  5/28/2022 7:13 PM CDT  Workstation: [id number]    Signed: 05/28/22 1913 by Jocy Jesus MD    Narrative:      CT CHEST WITH IV CONTRAST    INDICATION: 46 years Female; Pulmonary embolism (PE) suspected,  unknown D-dimer, J96.01 Acute respiratory failure with hypoxia  J18.9 Pneumonia, unspecified organism A41.9 Sepsis, unspecified  organism E87.6 Hypokalemia    TECHNIQUE:  CT scan of the chest was performed with IV contrast.   This exam was performed according to our departmental  dose-optimization program, which includes automated exposure  control, adjustment of the mA and/or kV according to patient size  and/or use of iterative reconstruction technique.     Comparison: 10/24/2020.    FINDINGS:  Thyroid: Unremarkable.   Heart/Mediastinum: No evidence of right heart strain.   Vasculature: No aortic aneurysm or dissection.  Lungs/Pleura: Small filling defect within the subsegmental branch  of the right lower lobe and in the segmental  branch of the left  lower lobe consistent with pulmonary emboli. Severe patchy  groundglass opacities with interlobular septal thickening  throughout both lungs concerning for multifocal atypical  pneumonia. No pleural effusion or pneumothorax.   Lymph nodes: Multiple subcentimeter lymph nodes in the  mediastinum are probably reactive.  Bones: Old compression deformity at T11.  Soft tissues: Unremarkable.  Incidental findings: None.      Impression:      1.  Small filling defect within the subsegmental branch of the  right lower lobe and in the segmental branch of the left lower  lobe consistent with pulmonary emboli. No right heart strain.  2.  Severe patchy groundglass opacities with interlobular septal  thickening throughout both lungs concerning for multifocal  atypical pneumonia.    Electronically signed by:  Jocy Jesus  5/28/2022 7:01 PM CDT  Workstation: [id number]    XR Chest 1 View [[id number]] Collected: 05/28/22 1443     Updated: 05/28/22 1520    Narrative:        PORTABLE CHEST    HISTORY: Hypoxemia    Portable AP upright film of the chest was obtained at 2:18 PM.  COMPARISON: October 24, 2020    FINDINGS:   EKG leads.  Rather extensive bilateral infiltrates or edema.  The heart is not enlarged.  The pulmonary vasculature is not increased.  No pleural effusion.  No pneumothorax.  No acute osseous abnormality.      Impression:      CONCLUSION:  Rather extensive bilateral infiltrates or edema.    [id number]    Electronically signed by:  Alex Wong MD  5/28/2022 3:14 PM CDT  Workstation: [id number]          Chief Complaint on Day of Discharge: SOB    Hospital Course:  The patient is a 46 y.o. female who presented to [facility] with shortness of breath.  Patient rapidly diagnosed with pulmonary embolus and community-acquired pneumonia during initial hospital assessment.  CTA chest confirmed bilateral pulmonary embolus without right heart strain.  Lower extremity Doppler showed no DVT.  Patient  "started on heparin gtt and antibiotics.  Patient rapidly transitioned to apixaban therapy with heparin GTT discontinued.  Patient originally required high flow nasal cannula and BiPAP for adequate oxygenation's.  However, by 5/31, patient able to maintain O2 saturations greater than 90% on 3 L nasal cannula.  Patient subsequently discharged home on continuous oxygen therapy, 7-day Augmentin treatment course, and apixaban therapy.  Patient advised to follow-up with both primary care physician and hematology as outpatient for continued pulmonary embolus work-up.  Patient also discharged home on 3 L nasal cannula continuous oxygen with oxygen supplied to patient prior to hospital disposition.    Patient also suffered from hypoglycemia during hospitalization treated with Levemir/NovoLog short acting.  Patient has insulin pump, which was paused during hospitalization.  Patient will resume insulin pump management of blood sugars once discharged from hospital.    Condition on Discharge: Fair    Physical Exam on Discharge:  /70   Pulse 91   Temp 98.8 °F (37.1 °C) (Oral)   Resp 20   Ht 167.6 cm (65.98\")   Wt 69.3 kg (152 lb 12.5 oz)   SpO2 94%   BMI 24.67 kg/m²   Physical Exam  Constitutional:       Appearance: Normal appearance. She is obese.   HENT:      Right Ear: External ear normal.      Left Ear: External ear normal.      Nose: Nose normal.      Mouth/Throat:      Mouth: Mucous membranes are moist.      Pharynx: Oropharynx is clear.   Eyes:      Extraocular Movements: Extraocular movements intact.      Conjunctiva/sclera: Conjunctivae normal.      Pupils: Pupils are equal, round, and reactive to light.   Cardiovascular:      Rate and Rhythm: Normal rate and regular rhythm.      Pulses: Normal pulses.      Heart sounds: Normal heart sounds.   Pulmonary:      Effort: Pulmonary effort is normal.      Comments: Decreased bibasilar breath sounds  Abdominal:      General: Abdomen is flat. Bowel sounds are " normal.      Palpations: Abdomen is soft.   Musculoskeletal:         General: Normal range of motion.      Cervical back: Normal range of motion and neck supple.   Skin:     General: Skin is warm and dry.      Capillary Refill: Capillary refill takes less than 2 seconds.   Neurological:      General: No focal deficit present.      Mental Status: She is alert and oriented to person, place, and time. Mental status is at baseline.   Psychiatric:         Mood and Affect: Mood normal.         Behavior: Behavior normal.         Thought Content: Thought content normal.         Judgment: Judgment normal.         Discharge Disposition:  Home or Self Care    Discharge Medications:     Discharge Medications      New Medications      Instructions Start Date   amoxicillin-clavulanate 875-125 MG per tablet  Commonly known as: Augmentin   1 tablet, Oral, 2 Times Daily      apixaban 5 MG tablet tablet  Commonly known as: ELIQUIS   Take 2 tabs by mouth twice a day for 4 days then start taking 1 tab by mouth twice a day therafter         Continue These Medications      Instructions Start Date   buprenorphine-naloxone 8-2 MG per SL tablet  Commonly known as: SUBOXONE   TAKE 3 TABLETS DISSOLVED UNDER THE TONGUE DAILY      dilTIAZem 30 MG tablet  Commonly known as: CARDIZEM   30 mg, Oral, 3 Times Daily      docusate sodium 100 MG capsule  Commonly known as: COLACE   100 mg, Oral, 2 Times Daily PRN      DULoxetine 60 MG capsule  Commonly known as: CYMBALTA   60 mg, Oral, Daily      famotidine 40 MG tablet  Commonly known as: PEPCID   40 mg, Oral, Daily      ferrous sulfate 325 (65 FE) MG tablet   325 mg, Oral, Daily With Breakfast      fluconazole 150 MG tablet  Commonly known as: DIFLUCAN   TAKE 1 TABLET BY MOUTH ON DAY OF INFECTION AND TAKE 1 TABLET 3 DAYS LATER      folic acid 1 MG tablet  Commonly known as: FOLVITE   1 mg, Oral, Daily      FreeStyle Tracee 14 Day Sensor misc   USE TO CHECK BLOOD SUGAR, CHANGE EVERY 14 DAYS       FreeStyle Tracee 2 Sims Systm device   1 each, Does not apply, Continuous, Use as indicated for glucose monitoring      glucagon 1 MG injection  Commonly known as: GLUCAGEN   1 mg, Subcutaneous, Once As Needed      hydrOXYzine 50 MG tablet  Commonly known as: ATARAX   50 mg, Oral, As Needed      Insulin Lispro (1 Unit Dial) 100 UNIT/ML solution pen-injector  Commonly known as: HumaLOG KwikPen   USE UP TO 20 UNITS 3 TIMES A DAY WITH MEALS      Invokana 100 MG tablet tablet  Generic drug: Canagliflozin   TAKE 1 TABLET BY MOUTH EVERY DAY      Lantus SoloStar 100 UNIT/ML injection pen  Generic drug: Insulin Glargine   20 Units, Subcutaneous, Nightly      Linzess 145 MCG capsule capsule  Generic drug: linaclotide   TAKE 1 CAPSULE BY MOUTH EVERY MORNING BEFORE BREAKFAST      lisinopril 2.5 MG tablet  Commonly known as: PRINIVIL,ZESTRIL   2.5 mg, Oral, Daily      magnesium oxide 400 MG tablet  Commonly known as: MAG-OX   200 mg, Oral, Daily      ondansetron ODT 4 MG disintegrating tablet  Commonly known as: ZOFRAN-ODT   4 mg, Translingual, Every 8 Hours PRN      Ozempic (0.25 or 0.5 MG/DOSE) 2 MG/1.5ML solution pen-injector  Generic drug: Semaglutide(0.25 or 0.5MG/DOS)   INJECT 0.5MG SUB-Q UNDER THE SKIN INTO THE APPROPRIATE AREA AS DIRECTED ONCE A WEEK      pantoprazole 40 MG EC tablet  Commonly known as: PROTONIX   40 mg, Oral, Daily      Pen Needles 32G X 4 MM misc   1 each, Does not apply, 4 Times Daily, Use 4 x daily, Dx code E11.65      predniSONE 5 MG tablet  Commonly known as: DELTASONE   TAKE 6 TABLETS BY MOUTH 2 (TWO) TIMES A DAY.      Rexulti 1 MG tablet  Generic drug: Brexpiprazole   1 mg, Oral, Daily      rosuvastatin 20 MG tablet  Commonly known as: CRESTOR   20 mg, Oral, Daily      Sublocade 300 MG/1.5ML solution prefilled syringe  Generic drug: Buprenorphine ER   Subcutaneous      tiZANidine 4 MG tablet  Commonly known as: ZANAFLEX   4 mg, Oral, Every 8 Hours PRN      traZODone 50 MG tablet  Commonly  known as: DESYREL   50 mg, Oral, Nightly      vitamin B-12 1000 MCG tablet  Commonly known as: CYANOCOBALAMIN   1,000 mcg, Oral, Daily             Discharge Diet: Diabetic    Activity at Discharge: Slowly regain normal activity level    Discharge Care Plan/Instructions: Follow-up with hematology and primary care physician on outpatient basis.    Follow-up Appointments:   Future Appointments   Date Time Provider Department Center   6/30/2022  2:15 PM Dano Wong APRN [facility]       Test Results Pending at Discharge:   Pending Labs     Order Current Status    Blood Culture - Blood, Arm, Left Preliminary result    Blood Culture - Blood, Arm, Left Preliminary result                    Time: 1239pm              Electronically signed by Андрей Childs MD at 05/31/22 [id number]

## 2022-06-02 LAB
BACTERIA SPEC AEROBE CULT: NORMAL
BACTERIA SPEC AEROBE CULT: NORMAL

## 2022-06-06 ENCOUNTER — READMISSION MANAGEMENT (OUTPATIENT)
Dept: CALL CENTER | Facility: HOSPITAL | Age: 47
End: 2022-06-06

## 2022-06-06 NOTE — OUTREACH NOTE
COPD/PN Week 1 Survey    Flowsheet Row Responses   [facility] facility patient discharged from? [place]   Does the patient have one of the following disease processes/diagnoses(primary or secondary)? COPD/Pneumonia   Was the primary reason for admission: Pneumonia   Week 1 attempt successful? No   Unsuccessful attempts Attempt 1          NAPOLEON BOOTHE - Registered Nurse

## 2022-06-08 ENCOUNTER — READMISSION MANAGEMENT (OUTPATIENT)
Dept: CALL CENTER | Facility: HOSPITAL | Age: 47
End: 2022-06-08

## 2022-06-08 NOTE — OUTREACH NOTE
COPD/PN Week 1 Survey    Flowsheet Row Responses   [facility] facility patient discharged from? [place]   Does the patient have one of the following disease processes/diagnoses(primary or secondary)? COPD/Pneumonia   Was the primary reason for admission: Pneumonia   Week 1 attempt successful? No   Unsuccessful attempts Attempt 2          WILVER EDOUARD - Registered Nurse

## 2022-06-13 ENCOUNTER — READMISSION MANAGEMENT (OUTPATIENT)
Dept: CALL CENTER | Facility: HOSPITAL | Age: 47
End: 2022-06-13

## 2022-06-13 NOTE — OUTREACH NOTE
COPD/PN Week 1 Survey    Flowsheet Row Responses   [facility] facility patient discharged from? [place]   Does the patient have one of the following disease processes/diagnoses(primary or secondary)? COPD/Pneumonia   Was the primary reason for admission: Pneumonia   Week 1 attempt successful? No   Unsuccessful attempts Attempt 3          NOY KNOWLES - Registered Nurse

## 2022-06-14 ENCOUNTER — TRANSCRIBE ORDERS (OUTPATIENT)
Dept: GENERAL RADIOLOGY | Facility: CLINIC | Age: 47
End: 2022-06-14

## 2022-06-14 DIAGNOSIS — J18.9 PNEUMONIA OF BOTH LUNGS DUE TO INFECTIOUS ORGANISM, UNSPECIFIED PART OF LUNG: Primary | ICD-10-CM

## 2022-06-15 DIAGNOSIS — E11.65 TYPE 2 DIABETES MELLITUS WITH HYPERGLYCEMIA, WITH LONG-TERM CURRENT USE OF INSULIN: ICD-10-CM

## 2022-06-15 DIAGNOSIS — Z79.4 TYPE 2 DIABETES MELLITUS WITH HYPERGLYCEMIA, WITH LONG-TERM CURRENT USE OF INSULIN: ICD-10-CM

## 2022-06-15 RX ORDER — CANAGLIFLOZIN 100 MG/1
TABLET, FILM COATED ORAL
Qty: 30 TABLET | Refills: 5 | Status: SHIPPED | OUTPATIENT
Start: 2022-06-15 | End: 2023-01-11

## 2022-06-17 ENCOUNTER — LAB (OUTPATIENT)
Dept: ONCOLOGY | Facility: HOSPITAL | Age: 47
End: 2022-06-17

## 2022-06-17 ENCOUNTER — OFFICE VISIT (OUTPATIENT)
Dept: ONCOLOGY | Facility: CLINIC | Age: 47
End: 2022-06-17

## 2022-06-17 VITALS
WEIGHT: 148.8 LBS | OXYGEN SATURATION: 98 % | DIASTOLIC BLOOD PRESSURE: 74 MMHG | HEART RATE: 121 BPM | BODY MASS INDEX: 24.03 KG/M2 | SYSTOLIC BLOOD PRESSURE: 109 MMHG | TEMPERATURE: 98.2 F

## 2022-06-17 DIAGNOSIS — D72.828 OTHER ELEVATED WHITE BLOOD CELL (WBC) COUNT: Primary | ICD-10-CM

## 2022-06-17 DIAGNOSIS — T14.8XXA BRUISING: ICD-10-CM

## 2022-06-17 DIAGNOSIS — D75.839 THROMBOCYTOSIS: ICD-10-CM

## 2022-06-17 DIAGNOSIS — E61.1 IRON DEFICIENCY: ICD-10-CM

## 2022-06-17 DIAGNOSIS — I82.4Z3 ACUTE DEEP VEIN THROMBOSIS (DVT) OF DISTAL VEIN OF BOTH LOWER EXTREMITIES: ICD-10-CM

## 2022-06-17 DIAGNOSIS — Z86.718 PERSONAL HISTORY OF OTHER VENOUS THROMBOSIS AND EMBOLISM: ICD-10-CM

## 2022-06-17 PROBLEM — D72.829 LEUCOCYTOSIS: Status: ACTIVE | Noted: 2022-06-17

## 2022-06-17 PROBLEM — D72.829 LEUCOCYTOSIS: Chronic | Status: ACTIVE | Noted: 2022-06-17

## 2022-06-17 LAB
BASOPHILS # BLD AUTO: 0.1 10*3/MM3 (ref 0–0.2)
BASOPHILS NFR BLD AUTO: 0.7 % (ref 0–1.5)
DEPRECATED RDW RBC AUTO: 54.7 FL (ref 37–54)
EOSINOPHIL # BLD AUTO: 0.41 10*3/MM3 (ref 0–0.4)
EOSINOPHIL NFR BLD AUTO: 3 % (ref 0.3–6.2)
ERYTHROCYTE [DISTWIDTH] IN BLOOD BY AUTOMATED COUNT: 18.7 % (ref 12.3–15.4)
FERRITIN SERPL-MCNC: 29.21 NG/ML (ref 13–150)
FOLATE SERPL-MCNC: 8.52 NG/ML (ref 4.78–24.2)
HCT VFR BLD AUTO: 38.6 % (ref 34–46.6)
HCYS SERPL-MCNC: 10.2 UMOL/L (ref 0–15)
HGB BLD-MCNC: 11.9 G/DL (ref 12–15.9)
IMM GRANULOCYTES # BLD AUTO: 0.11 10*3/MM3 (ref 0–0.05)
IMM GRANULOCYTES NFR BLD AUTO: 0.8 % (ref 0–0.5)
IRON 24H UR-MRATE: 47 MCG/DL (ref 37–145)
IRON SATN MFR SERPL: 10 % (ref 20–50)
LYMPHOCYTES # BLD AUTO: 2.55 10*3/MM3 (ref 0.7–3.1)
LYMPHOCYTES NFR BLD AUTO: 18.6 % (ref 19.6–45.3)
MCH RBC QN AUTO: 25.2 PG (ref 26.6–33)
MCHC RBC AUTO-ENTMCNC: 30.8 G/DL (ref 31.5–35.7)
MCV RBC AUTO: 81.8 FL (ref 79–97)
MONOCYTES # BLD AUTO: 0.7 10*3/MM3 (ref 0.1–0.9)
MONOCYTES NFR BLD AUTO: 5.1 % (ref 5–12)
NEUTROPHILS NFR BLD AUTO: 71.8 % (ref 42.7–76)
NEUTROPHILS NFR BLD AUTO: 9.87 10*3/MM3 (ref 1.7–7)
NRBC BLD AUTO-RTO: 0 /100 WBC (ref 0–0.2)
PLATELET # BLD AUTO: 321 10*3/MM3 (ref 140–450)
PMV BLD AUTO: 8.8 FL (ref 6–12)
RBC # BLD AUTO: 4.72 10*6/MM3 (ref 3.77–5.28)
TIBC SERPL-MCNC: 451 MCG/DL (ref 298–536)
TRANSFERRIN SERPL-MCNC: 303 MG/DL (ref 200–360)
VIT B12 BLD-MCNC: 439 PG/ML (ref 211–946)
WBC NRBC COR # BLD: 13.74 10*3/MM3 (ref 3.4–10.8)

## 2022-06-17 PROCEDURE — 99214 OFFICE O/P EST MOD 30 MIN: CPT | Performed by: INTERNAL MEDICINE

## 2022-06-17 PROCEDURE — 82728 ASSAY OF FERRITIN: CPT | Performed by: INTERNAL MEDICINE

## 2022-06-17 PROCEDURE — 82607 VITAMIN B-12: CPT | Performed by: INTERNAL MEDICINE

## 2022-06-17 PROCEDURE — 82746 ASSAY OF FOLIC ACID SERUM: CPT | Performed by: INTERNAL MEDICINE

## 2022-06-17 PROCEDURE — 85025 COMPLETE CBC W/AUTO DIFF WBC: CPT | Performed by: INTERNAL MEDICINE

## 2022-06-17 PROCEDURE — 86147 CARDIOLIPIN ANTIBODY EA IG: CPT | Performed by: INTERNAL MEDICINE

## 2022-06-17 PROCEDURE — 83540 ASSAY OF IRON: CPT | Performed by: INTERNAL MEDICINE

## 2022-06-17 PROCEDURE — 84466 ASSAY OF TRANSFERRIN: CPT | Performed by: INTERNAL MEDICINE

## 2022-06-17 PROCEDURE — 86146 BETA-2 GLYCOPROTEIN ANTIBODY: CPT | Performed by: INTERNAL MEDICINE

## 2022-06-17 PROCEDURE — 83090 ASSAY OF HOMOCYSTEINE: CPT | Performed by: INTERNAL MEDICINE

## 2022-06-17 PROCEDURE — G0463 HOSPITAL OUTPT CLINIC VISIT: HCPCS | Performed by: INTERNAL MEDICINE

## 2022-06-17 PROCEDURE — 81240 F2 GENE: CPT | Performed by: INTERNAL MEDICINE

## 2022-06-17 PROCEDURE — 81241 F5 GENE: CPT | Performed by: INTERNAL MEDICINE

## 2022-06-17 RX ORDER — DOCUSATE SODIUM 100 MG/1
100 CAPSULE, LIQUID FILLED ORAL 2 TIMES DAILY PRN
Qty: 60 CAPSULE | Refills: 2 | Status: SHIPPED | OUTPATIENT
Start: 2022-06-17 | End: 2023-03-25 | Stop reason: SDUPTHER

## 2022-06-17 RX ORDER — FERROUS SULFATE 325(65) MG
325 TABLET ORAL
Qty: 30 TABLET | Refills: 3 | Status: SHIPPED | OUTPATIENT
Start: 2022-06-17 | End: 2023-03-25 | Stop reason: SDUPTHER

## 2022-06-17 NOTE — PROGRESS NOTES
DATE OF VISIT: 6/18/2022      REASON FOR VISIT: Leukocytosis, thrombocytosis, easy bruising, DVT with bilateral pulmonary embolism on Eliquis, iron deficiency      HISTORY OF PRESENT ILLNESS:   46-year-old female with medical problem consisting of hypertension, dyslipidemia, diabetes mellitus, anxiety/depression, Cushing's disease for which patient is on chronic prednisone, history of kidney stone, sepsis history of gastric  surgery for ulceration in October 2020 was initially seen in consultation on April 1, 2021.  Patient was started on ferrous sulfate, B12 and folic acid p.o. daily but she never followed up after initial consultation.  Patient was recently admitted to [facility] and was found to have DVT and bilateral pulmonary embolism on May 28, 2022 for which she has been started on Eliquis.  Patient denies any bleeding.  Complains of easy bruising.  Complains of fatigue.  Denies any new lymph node enlargement.  Family history significant for upper extremity DVT in her mother.  Denies any other family member having history of DVT or pulmonary embolism.              Past Medical History, Past Surgical History, Social History, Family History have been reviewed and are without significant changes except as mentioned.    Review of Systems   A comprehensive 14 point review of systems was performed and was negative except as mentioned in HPI.    Medications:  The current medication list was reviewed in the EMR    ALLERGIES:    Allergies   Allergen Reactions   • Adhesive Tape Unknown - Low Severity     Tares skin    • Bactrim [Sulfamethoxazole-Trimethoprim] Hives   • Ciprofloxacin Hives   • Latex Rash     Only when pt wears latex gloves        Objective      Vitals:    06/17/22 1121   BP: 109/74   Pulse: (!) 121   Temp: 98.2 °F (36.8 °C)   TempSrc: Temporal   SpO2: 98%  Comment: 02 TANK ON 1   Weight: 67.5 kg (148 lb 12.8 oz)   PainSc: 0-No pain     No flowsheet data found.    Physical  Exam  Pulmonary:      Breath sounds: Normal breath sounds.   Musculoskeletal:      Right lower leg: No edema.      Left lower leg: No edema.   Neurological:      Mental Status: She is alert and oriented to person, place, and time.           RECENT LABS:  Glucose   Date Value Ref Range Status   05/31/2022 93 65 - 99 mg/dL Final     Glucose, Arterial   Date Value Ref Range Status   10/24/2020 137 (H) 65 - 95 mmol/L Final     Sodium   Date Value Ref Range Status   06/07/2022 138 136 - 145 mmol/L Final     Potassium   Date Value Ref Range Status   06/07/2022 4.3 3.5 - 5.1 mmol/L Final     Total CO2   Date Value Ref Range Status   06/07/2022 33 (H) 21 - 31 mmol/L Final     Chloride   Date Value Ref Range Status   06/07/2022 97 (L) 98 - 107 mmol/L Final     Anion Gap   Date Value Ref Range Status   05/31/2022 9.0 5.0 - 15.0 mmol/L Final     Creatinine   Date Value Ref Range Status   06/07/2022 0.6 (L) 0.7 - 1.3 mg/dL Final     BUN   Date Value Ref Range Status   06/07/2022 22 7 - 25 mg/dL Final     BUN/Creatinine Ratio   Date Value Ref Range Status   05/31/2022 43.8 (H) 7.0 - 25.0 Final     Calcium   Date Value Ref Range Status   06/07/2022 9.2 8.6 - 10.3 mg/dL Final     eGFR Non  Amer   Date Value Ref Range Status   10/26/2020 101 >60 mL/min/1.73 Final     Alkaline Phosphatase   Date Value Ref Range Status   06/07/2022 51 34 - 104 U/L Final   12/06/2019 44 (L) 46 - 116 U/L Final     Total Protein   Date Value Ref Range Status   05/28/2022 5.8 (L) 6.0 - 8.5 g/dL Final   12/06/2019 7.2 6.4 - 8.2 g/dL Final     ALT (SGPT)   Date Value Ref Range Status   06/07/2022 13 7 - 52 U/L Final   12/06/2019 40 14 - 59 U/L Final     AST (SGOT)   Date Value Ref Range Status   06/07/2022 15 13 - 39 U/L Final   12/06/2019 35 15 - 37 U/L Final     Total Bilirubin   Date Value Ref Range Status   06/07/2022 0.24 (L) 0.3 - 1.0 mg/dL Final     Albumin   Date Value Ref Range Status   06/07/2022 3.8 3.5 - 5.7 g/dL Final     Globulin    Date Value Ref Range Status   05/28/2022 3.2 gm/dL Final     Lab Results   Component Value Date    WBC 13.74 (H) 06/17/2022    HGB 11.9 (L) 06/17/2022    HCT 38.6 06/17/2022    MCV 81.8 06/17/2022     06/17/2022     Lab Results   Component Value Date    NEUTROABS 9.87 (H) 06/17/2022    IRON 47 06/17/2022    IRON 21 (L) 04/01/2021    TIBC 451 06/17/2022    TIBC 550 (H) 04/01/2021    LABIRON 10 (L) 06/17/2022    LABIRON 4 (L) 04/01/2021    FERRITIN 29.21 06/17/2022    FERRITIN 9.38 (L) 04/01/2021    RSOQCGRS09 439 06/17/2022    VXOYEGVC39 289 01/14/2022    EZNEZFMC10 294 03/10/2021    FOLATE 8.52 06/17/2022    FOLATE 7.93 04/01/2021     Lab Results   Component Value Date    2QNYH41PNW 5.4 08/20/2020         PATHOLOGY:  * Cannot find OR log *         RADIOLOGY DATA :  CT of chest with contrast done on May 28, 2022 showed:    IMPRESSION:  1.  Small filling defect within the subsegmental branch of the  right lower lobe and in the segmental branch of the left lower  lobe consistent with pulmonary emboli. No right heart strain.  2.  Severe patchy groundglass opacities with interlobular septal  thickening throughout both lungs concerning for multifocal  atypical pneumonia.        Doppler ultrasound of bilateral lower extremity done on May 28, 2022 showed:    The common femoral,  femoral, and popliteal veins of the  bilateral     lower extremity are well identified and compress  normally.  Doppler signals are heard either spontaneously or on  distal compression.  No evidence of intraluminal thrombus was  noted.      Noncompressible thrombi in several posterior calf veins.     IMPRESSION:  No evidence of deep venous thrombosis in the common  femoral, femoral, or popliteal veins of the bilateral lower  extremities.     Noncompressible thrombi in several of the posterior calf veins.          No radiology results for the last 7 days        Assessment & Plan     1.  Anemia:  - Repeat CBC today shows hemoglobin is 11.8.   MCV is 81  - Iron studies are consistent with iron deficiency with iron saturation of 10% and ferritin of 29  - Patient was prescribed ferrous sulfate upon last clinic visit but she did not take more than 1 month of ferrous sulfate and never followed up.  - We will represcribe ferrous sulfate p.o. daily with stool softener.  - Patient was encouraged to call our office if she cannot tolerate iron by mouth, in that case we will start her on intravenous iron replacement.  Vitamin B12 is 439, folate level is 8.5.  We will start patient on B12 and folic acid p.o. daily.  - We will have patient return to clinic in 2 months with repeat CBC, CMP, iron studies, ferritin level and recommendation  Medical history    2.  Leukocytosis:  - Multifactorial secondary to chronic prednisone use plus or minus inflammation  - White blood cell count is 13,000.  Will monitor with CBC for now    3.  Thrombocytosis:  - Reactive due to iron deficiency  - Platelet count is 321,000 today.  - We will correct iron deficiency first.    4.  DVT with bilateral pulmonary embolism  - Patient was diagnosed with DVT involving posterior calf pain as well as bilateral pulmonary embolism  - Since mother also has a history of upper extremity DVT we will do hypercoagulable work-up consisting of factor V Leyden, prothrombin gene mutation, anticardiolipin antibodies, beta-2 glycoprotein antibodies and homocystine level today.  - Recommend continue with Eliquis for now    5.  Cushing's disease on prednisone     6.  Health maintenance: Patient states she quit smoking in May 2022.    7. Advance Care Planning: For now patient remains full code and is able to make decisions.  Patient has health care surrogate mentioned on chart.      8.  Prescriptions: Prescription for ferrous, Colace, B12 and folic acid has been sent to her pharmacy today.           PHQ-9 Total Score: 0   -Patient is not homicidal or suicidal.  No acute intervention required.    Jeanie Dejesus  Robert reports a pain score of 0.  Given her pain assessment as noted, treatment options were discussed and the following options were decided upon as a follow-up plan to address the patient's pain: continuation of current treatment plan for pain.         Darnell Reyes MD  6/18/2022  21:34 CDT        Part of this note may be an electronic transcription/translation of spoken language to printed text using the Dragon Dictation System.          CC:

## 2022-06-18 LAB
CARDIOLIPIN IGA SER IA-ACNC: <9 APL U/ML (ref 0–11)
CARDIOLIPIN IGG SER IA-ACNC: <9 GPL U/ML (ref 0–14)
CARDIOLIPIN IGM SER IA-ACNC: <9 MPL U/ML (ref 0–12)

## 2022-06-18 RX ORDER — LANOLIN ALCOHOL/MO/W.PET/CERES
1000 CREAM (GRAM) TOPICAL DAILY
Qty: 90 TABLET | Refills: 1 | Status: SHIPPED | OUTPATIENT
Start: 2022-06-18 | End: 2023-03-25 | Stop reason: SDUPTHER

## 2022-06-18 RX ORDER — FOLIC ACID 1 MG/1
1 TABLET ORAL DAILY
Qty: 90 TABLET | Refills: 1 | Status: SHIPPED | OUTPATIENT
Start: 2022-06-18 | End: 2023-03-25 | Stop reason: SDUPTHER

## 2022-06-19 LAB
B2 GLYCOPROT1 IGA SER-ACNC: <9 GPI IGA UNITS (ref 0–25)
B2 GLYCOPROT1 IGG SER-ACNC: <9 GPI IGG UNITS (ref 0–20)
B2 GLYCOPROT1 IGM SER-ACNC: <9 GPI IGM UNITS (ref 0–32)

## 2022-06-20 ENCOUNTER — TELEPHONE (OUTPATIENT)
Dept: ONCOLOGY | Facility: HOSPITAL | Age: 47
End: 2022-06-20

## 2022-06-20 LAB
F5 GENE MUT ANL BLD/T: ABNORMAL
FACTOR II, DNA ANALYSIS: NORMAL

## 2022-06-20 NOTE — TELEPHONE ENCOUNTER
----- Message from Darnell Reyes MD sent at 6/18/2022  9:35 PM CDT -----  Regarding: labs  Let patient know, B12 level is 431, folate level is 8.5.  Recommend adding ferrous sulfate, B12, folic acid daily.  Prescription for ferrous sulfate, Colace, B12, folic acid has been sent to her pharmacy today.  White blood cell count is 13,000, hemoglobin is 11.9, platelet count is normal at 3 29,000.  Thank you

## 2022-06-20 NOTE — TELEPHONE ENCOUNTER
Contacted pt and advised regarding lab work. PT is willing to take oral supplements. Advised regarding possible GI upset/constipation and to contact office office if those arise. PT verbalizes understanding and denies any further questions.

## 2022-06-21 ENCOUNTER — READMISSION MANAGEMENT (OUTPATIENT)
Dept: CALL CENTER | Facility: HOSPITAL | Age: 47
End: 2022-06-21

## 2022-06-21 NOTE — OUTREACH NOTE
"COPD/PN Week 2 Survey    Flowsheet Row Responses   [facility] patient discharged from? [place]   Does the patient have one of the following disease processes/diagnoses(primary or secondary)? COPD/Pneumonia   Was the primary reason for admission: Pneumonia   Week 2 attempt successful? Yes   Call start time 1231   Call end time 1233   Discharge diagnosis Hypokalemia  Acute respiratory failure with hypoxia Pneumonia of both lungs due to infectious organism   Meds reviewed with patient/caregiver? Yes   Is the patient having any side effects they believe may be caused by any medication additions or changes? No   Does the patient have all medications ordered at discharge? Yes   Is the patient taking all medications as directed (includes completed medication regime)? Yes   Medication comments completed anitbiotics   Does the patient have a primary care provider?  Yes   Has the patient kept scheduled appointments due by today? Yes   What DME was ordered? [employer]    Has all DME been delivered? Yes   Pulse Ox monitoring Intermittent   Pulse Ox device source Patient   O2 Sat comments High 90s on 2L   O2 Sat: education provided Sat levels, Monitoring frequency, When to seek care   Psychosocial issues? No   What is the patient's perception of their health status since discharge? Improving   Is the patient/caregiver able to teach back the hierarchy of who to call/visit for symptoms/problems? PCP, Specialist, Home health nurse, Urgent Care, ED, 911 Yes   Additional teach back comments States she is doing \"pretty good\"   Is the patient/caregiver able to teach back signs and symptoms of worsening condition: Fever/chills, Shortness of breath, Chest pain   Is the patient/caregiver able to teach back importance of completing antibiotic course of treatment? Yes   Week 2 call completed? Yes   Revoked No further contact(revokes)-requires comment   Graduated/Revoked comments Denies questions or needs a this time.    "       JOSEPH PETERSEN - Licensed Nurse

## 2022-06-27 ENCOUNTER — TRANSCRIBE ORDERS (OUTPATIENT)
Dept: GENERAL RADIOLOGY | Facility: CLINIC | Age: 47
End: 2022-06-27

## 2022-06-30 ENCOUNTER — TELEMEDICINE (OUTPATIENT)
Dept: ENDOCRINOLOGY | Facility: CLINIC | Age: 47
End: 2022-06-30

## 2022-06-30 DIAGNOSIS — E55.9 VITAMIN D DEFICIENCY: Primary | ICD-10-CM

## 2022-06-30 DIAGNOSIS — E11.65 TYPE 2 DIABETES MELLITUS WITH HYPERGLYCEMIA, WITH LONG-TERM CURRENT USE OF INSULIN: ICD-10-CM

## 2022-06-30 DIAGNOSIS — R21 RASH: ICD-10-CM

## 2022-06-30 DIAGNOSIS — Z79.4 TYPE 2 DIABETES MELLITUS WITH HYPERGLYCEMIA, WITH LONG-TERM CURRENT USE OF INSULIN: ICD-10-CM

## 2022-06-30 PROCEDURE — 99214 OFFICE O/P EST MOD 30 MIN: CPT | Performed by: NURSE PRACTITIONER

## 2022-06-30 NOTE — PROGRESS NOTES
Chief Complaint  Diabetes    Subjective          Jeanie Anjelica Jones presents to [facility]  History of Present Illness       You have chosen to receive care through a telehealth visit.  Do you consent to use a video/audio connection for your medical care today? 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     Diagnosed in 2019      Timing constant     Quality not controlled       Severity moderate              Aggravating factors --steroid use         Blood glucose readings      She checks 4 times daily and has been for over 90 days       Tracee personal use -states back in to range      She is now on the Omni pod    States her blood sugars have been great since on the pump    However she is currently in the hospital with an abscess and states she is off the pump her blood glucose levels are high and they are managing it in the hospital        ---------------------------------------------------------------------------        RASH      Has been treated with prednisone for about 5 years      Rash is on the arms and legs     It is widespread, she does have itching      alleviating factors -- steroids and atarax      Modifying factors include the use of steroids and Atarax                    Objective   Vital Signs:   There were no vitals taken for this visit.    Physical Exam  Neurological:      General: No focal deficit present.      Mental Status: She is alert.   Psychiatric:         Mood and Affect: Mood normal.         Thought Content: Thought content normal.         Judgment: Judgment normal.        Result Review :   The following data was reviewed by: LEILANI Aguilar on 03/29/2022:  Common labs    Common Labsle 5/31/22 5/31/22  5/31/22 6/7/22 6/17/22    0358 0618 0737     Glucose 93       Glucose    76    BUN 21 (A)   22    Creatinine 0.48 (A)   0.6 (A)    Sodium 143   138    Potassium 3.4 (A) 3.5 3.5 4.3    Chloride 104   97 (A)    Calcium 8.6   9.2    Albumin    3.8    Total Bilirubin    0.24 (A)    Alkaline Phosphatase    51    AST (SGOT)    15    ALT (SGPT)    13    WBC     13.74 (A)   Hemoglobin     11.9 (A)   Hematocrit     38.6   Platelets     321   (A) Abnormal value                        Assessment and Plan    Diagnoses and all orders for this visit:    1. Vitamin D deficiency (Primary)    2. Type 2 diabetes mellitus with hyperglycemia, with long-term current use of insulin (HCC)    3. Rash           Rash ---            Using atarax 50 mg tid --continue       Rash does not appear to be endocrine related      Component      Latest Ref Rng & Units 8/18/2020 8/20/2020   Creatinine, 24H       0.70 - 1.60 g/24 hr   0.66 (L)   Creatinine, Urine      mg/dL   36.9   Urine Volume      mL   1,800   Time (Hours)      hrs   24   5-HIAA, Urine      Undefined mg/L   3.0   5-HIAA, 24H Ur      0.0 - 14.9 mg/24 hr   5.4   Calcitonin      0.0 - 5.0 pg/mL <2.0     Insulin-Like Growth Factor-1      74 - 239 ng/mL 94     MANUEL Direct      Negative Negative     Uric Acid      2.4 - 5.7 mg/dL 2.6     C-Reactive Protein      0.00 - 0.50 mg/dL 0.36     Sed Rate      0 - 20 mm/hr 2     C-Peptide      1.1 - 4.4 ng/mL 5.4 (H)                       -----------        Cushing's medication induced / adrenal insufficiency            Prednisone            Now on 5 mg tablets       taking one am and one pm      If fever , nausea or vomiting , take 100 mg IM and head to ER                    ------------     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     Lab Results   Component Value Date    HGBA1C 9.60 (H) 05/28/2022             Taking invokana 100 mg daily             Now on Omni pod pump    I do not have the settings    She states everything is fine in the pump so we will make  no changes    She is instructed to call for hyper or hypoglycemia         The goal is to have a sugar 2 hours after eating less than 180            Taking Ozempic 0.5 mg once weekly            No changes above -- bg is at goal      She now has a [employer] personal system     She uses the [employer] data to adjust her insulin dosages                  Bone health     Vitamin d def      Taking replacement      Follow Up   No follow-ups on file.  Patient was given instructions and counseling regarding her condition or for health maintenance advice. Please see specific information pulled into the AVS if appropriate.         This document has been electronically signed by LEILANI Aguilar on June 30, 2022 14:24 CDT.

## 2022-07-29 RX ORDER — FLUCONAZOLE 150 MG/1
TABLET ORAL
Qty: 2 TABLET | Refills: 6 | Status: SHIPPED | OUTPATIENT
Start: 2022-07-29 | End: 2022-09-12

## 2022-08-18 NOTE — TELEPHONE ENCOUNTER
Rx Refill Note  Requested Prescriptions     Pending Prescriptions Disp Refills   • apixaban (ELIQUIS) 5 MG tablet tablet 130 tablet 0     Sig: Take 2 tabs by mouth twice a day for 4 days then start taking 1 tab by mouth twice a day therafter  Indications: DVT/PE (active thrombosis)      Last office visit with prescribing clinician: 6/17/2022      Next office visit with prescribing clinician: 8/26/2022            Cuca Dunn RN  08/18/22, 11:56 CDT

## 2022-08-26 ENCOUNTER — APPOINTMENT (OUTPATIENT)
Dept: ONCOLOGY | Facility: HOSPITAL | Age: 47
End: 2022-08-26

## 2022-08-26 ENCOUNTER — APPOINTMENT (OUTPATIENT)
Dept: ONCOLOGY | Facility: CLINIC | Age: 47
End: 2022-08-26

## 2022-09-07 RX ORDER — INSULIN LISPRO 100 [IU]/ML
INJECTION, SOLUTION INTRAVENOUS; SUBCUTANEOUS
Qty: 15 ML | Refills: 11 | Status: SHIPPED | OUTPATIENT
Start: 2022-09-07

## 2022-09-12 ENCOUNTER — LAB (OUTPATIENT)
Dept: ONCOLOGY | Facility: HOSPITAL | Age: 47
End: 2022-09-12

## 2022-09-12 ENCOUNTER — OFFICE VISIT (OUTPATIENT)
Dept: ONCOLOGY | Facility: CLINIC | Age: 47
End: 2022-09-12

## 2022-09-12 VITALS
BODY MASS INDEX: 24.77 KG/M2 | SYSTOLIC BLOOD PRESSURE: 112 MMHG | OXYGEN SATURATION: 97 % | TEMPERATURE: 97.4 F | DIASTOLIC BLOOD PRESSURE: 79 MMHG | WEIGHT: 153.4 LBS | HEART RATE: 110 BPM

## 2022-09-12 DIAGNOSIS — D72.828 OTHER ELEVATED WHITE BLOOD CELL (WBC) COUNT: Primary | Chronic | ICD-10-CM

## 2022-09-12 DIAGNOSIS — D75.839 THROMBOCYTOSIS: ICD-10-CM

## 2022-09-12 DIAGNOSIS — E61.1 IRON DEFICIENCY: ICD-10-CM

## 2022-09-12 DIAGNOSIS — T14.8XXA BRUISING: ICD-10-CM

## 2022-09-12 DIAGNOSIS — E61.1 IRON DEFICIENCY: Chronic | ICD-10-CM

## 2022-09-12 DIAGNOSIS — D72.828 OTHER ELEVATED WHITE BLOOD CELL (WBC) COUNT: ICD-10-CM

## 2022-09-12 DIAGNOSIS — D75.839 THROMBOCYTOSIS: Chronic | ICD-10-CM

## 2022-09-12 DIAGNOSIS — Z86.718 PERSONAL HISTORY OF OTHER VENOUS THROMBOSIS AND EMBOLISM: ICD-10-CM

## 2022-09-12 DIAGNOSIS — I82.4Z3 ACUTE DEEP VEIN THROMBOSIS (DVT) OF DISTAL VEIN OF BOTH LOWER EXTREMITIES: ICD-10-CM

## 2022-09-12 LAB
ALBUMIN SERPL-MCNC: 4 G/DL (ref 3.5–5.2)
ALBUMIN/GLOB SERPL: 1.9 G/DL
ALP SERPL-CCNC: 39 U/L (ref 39–117)
ALT SERPL W P-5'-P-CCNC: 20 U/L (ref 1–33)
ANION GAP SERPL CALCULATED.3IONS-SCNC: 6 MMOL/L (ref 5–15)
AST SERPL-CCNC: 18 U/L (ref 1–32)
BASOPHILS # BLD AUTO: 0.04 10*3/MM3 (ref 0–0.2)
BASOPHILS NFR BLD AUTO: 0.3 % (ref 0–1.5)
BILIRUB SERPL-MCNC: 0.2 MG/DL (ref 0–1.2)
BUN SERPL-MCNC: 14 MG/DL (ref 6–20)
BUN/CREAT SERPL: 26.9 (ref 7–25)
CALCIUM SPEC-SCNC: 8.6 MG/DL (ref 8.6–10.5)
CHLORIDE SERPL-SCNC: 103 MMOL/L (ref 98–107)
CO2 SERPL-SCNC: 33 MMOL/L (ref 22–29)
CREAT SERPL-MCNC: 0.52 MG/DL (ref 0.57–1)
DEPRECATED RDW RBC AUTO: 52.6 FL (ref 37–54)
EGFRCR SERPLBLD CKD-EPI 2021: 116.9 ML/MIN/1.73
EOSINOPHIL # BLD AUTO: 0.22 10*3/MM3 (ref 0–0.4)
EOSINOPHIL NFR BLD AUTO: 1.4 % (ref 0.3–6.2)
ERYTHROCYTE [DISTWIDTH] IN BLOOD BY AUTOMATED COUNT: 15.8 % (ref 12.3–15.4)
FERRITIN SERPL-MCNC: 27.72 NG/ML (ref 13–150)
FOLATE SERPL-MCNC: >20 NG/ML (ref 4.78–24.2)
GLOBULIN UR ELPH-MCNC: 2.1 GM/DL
GLUCOSE SERPL-MCNC: 154 MG/DL (ref 65–99)
HCT VFR BLD AUTO: 46.4 % (ref 34–46.6)
HGB BLD-MCNC: 14.9 G/DL (ref 12–15.9)
IMM GRANULOCYTES # BLD AUTO: 0.15 10*3/MM3 (ref 0–0.05)
IMM GRANULOCYTES NFR BLD AUTO: 1 % (ref 0–0.5)
IRON 24H UR-MRATE: 76 MCG/DL (ref 37–145)
IRON SATN MFR SERPL: 18 % (ref 20–50)
LYMPHOCYTES # BLD AUTO: 4.5 10*3/MM3 (ref 0.7–3.1)
LYMPHOCYTES NFR BLD AUTO: 28.9 % (ref 19.6–45.3)
MCH RBC QN AUTO: 29.6 PG (ref 26.6–33)
MCHC RBC AUTO-ENTMCNC: 32.1 G/DL (ref 31.5–35.7)
MCV RBC AUTO: 92.2 FL (ref 79–97)
MONOCYTES # BLD AUTO: 1.16 10*3/MM3 (ref 0.1–0.9)
MONOCYTES NFR BLD AUTO: 7.5 % (ref 5–12)
NEUTROPHILS NFR BLD AUTO: 60.9 % (ref 42.7–76)
NEUTROPHILS NFR BLD AUTO: 9.5 10*3/MM3 (ref 1.7–7)
NRBC BLD AUTO-RTO: 0 /100 WBC (ref 0–0.2)
PLATELET # BLD AUTO: 255 10*3/MM3 (ref 140–450)
PMV BLD AUTO: 8.6 FL (ref 6–12)
POTASSIUM SERPL-SCNC: 3.5 MMOL/L (ref 3.5–5.2)
PROT SERPL-MCNC: 6.1 G/DL (ref 6–8.5)
RBC # BLD AUTO: 5.03 10*6/MM3 (ref 3.77–5.28)
SODIUM SERPL-SCNC: 142 MMOL/L (ref 136–145)
TIBC SERPL-MCNC: 423 MCG/DL (ref 298–536)
TRANSFERRIN SERPL-MCNC: 284 MG/DL (ref 200–360)
VIT B12 BLD-MCNC: 1238 PG/ML (ref 211–946)
WBC NRBC COR # BLD: 15.57 10*3/MM3 (ref 3.4–10.8)

## 2022-09-12 PROCEDURE — 82607 VITAMIN B-12: CPT

## 2022-09-12 PROCEDURE — 99214 OFFICE O/P EST MOD 30 MIN: CPT | Performed by: INTERNAL MEDICINE

## 2022-09-12 PROCEDURE — 82728 ASSAY OF FERRITIN: CPT

## 2022-09-12 PROCEDURE — 84466 ASSAY OF TRANSFERRIN: CPT

## 2022-09-12 PROCEDURE — 83540 ASSAY OF IRON: CPT

## 2022-09-12 PROCEDURE — 80053 COMPREHEN METABOLIC PANEL: CPT

## 2022-09-12 PROCEDURE — 1123F ACP DISCUSS/DSCN MKR DOCD: CPT | Performed by: INTERNAL MEDICINE

## 2022-09-12 PROCEDURE — 85025 COMPLETE CBC W/AUTO DIFF WBC: CPT

## 2022-09-12 PROCEDURE — 82746 ASSAY OF FOLIC ACID SERUM: CPT

## 2022-09-12 PROCEDURE — 1126F AMNT PAIN NOTED NONE PRSNT: CPT | Performed by: INTERNAL MEDICINE

## 2022-09-12 PROCEDURE — G0463 HOSPITAL OUTPT CLINIC VISIT: HCPCS | Performed by: INTERNAL MEDICINE

## 2022-09-12 RX ORDER — DOXYCYCLINE HYCLATE 100 MG/1
100 CAPSULE ORAL 2 TIMES DAILY
COMMUNITY
Start: 2022-07-28 | End: 2023-03-24

## 2022-09-12 RX ORDER — ONDANSETRON 4 MG/1
4 TABLET, FILM COATED ORAL EVERY 8 HOURS PRN
COMMUNITY

## 2022-09-12 RX ORDER — HYOSCYAMINE SULFATE 16 OZ
SOLUTION MISCELLANEOUS
COMMUNITY
Start: 2022-09-07

## 2022-09-12 RX ORDER — ALBUTEROL SULFATE 2.5 MG/3ML
2.5 SOLUTION RESPIRATORY (INHALATION)
COMMUNITY
Start: 2022-02-17

## 2022-09-12 RX ORDER — FLUCONAZOLE 150 MG/1
TABLET ORAL
COMMUNITY
Start: 2022-05-16

## 2022-09-12 RX ORDER — ALBUTEROL SULFATE 90 UG/1
AEROSOL, METERED RESPIRATORY (INHALATION)
COMMUNITY
Start: 2022-08-30

## 2022-09-12 RX ORDER — INSULIN PUMP CONTROLLER
EACH MISCELLANEOUS
COMMUNITY
Start: 2022-07-18

## 2022-09-12 RX ORDER — ERGOCALCIFEROL 1.25 MG/1
50000 CAPSULE ORAL WEEKLY
COMMUNITY
Start: 2022-08-30

## 2022-09-12 RX ORDER — HYDROXYZINE 50 MG/1
50 TABLET, FILM COATED ORAL
COMMUNITY

## 2022-09-12 RX ORDER — ERGOCALCIFEROL 1.25 MG/1
50000 CAPSULE ORAL
COMMUNITY
Start: 2022-02-17 | End: 2022-09-12

## 2022-09-12 RX ORDER — MAGNESIUM 200 MG
1 TABLET ORAL DAILY
COMMUNITY
End: 2023-03-24

## 2022-09-12 RX ORDER — CYANOCOBALAMIN 1000 UG/ML
1000 INJECTION, SOLUTION INTRAMUSCULAR; SUBCUTANEOUS
COMMUNITY
Start: 2022-06-15 | End: 2022-09-12

## 2022-09-12 RX ORDER — RIFAMPIN 150 MG/1
CAPSULE ORAL
COMMUNITY
Start: 2022-08-19 | End: 2023-03-24

## 2022-09-12 RX ORDER — CLINDAMYCIN HYDROCHLORIDE 300 MG/1
CAPSULE ORAL
COMMUNITY
Start: 2022-06-27 | End: 2023-03-24

## 2022-09-12 NOTE — PROGRESS NOTES
DATE OF VISIT: 9/12/2022      REASON FOR VISIT: Leukocytosis, thrombocytosis, DVT and pulmonary embolism on Eliquis, iron deficiency      HISTORY OF PRESENT ILLNESS:   47-year-old female with medical problem consisting of hypertension, dyslipidemia, diabetes mellitus, anxiety/depression, Cushing's disease for which patient is currently on chronic prednisone, history of kidney stone, gastric surgery for ulceration in October 2020 was initially seen in consultation on April 1, 2021.  Patient was diagnosed with DVT and pulmonary embolism on May 28, 2022 with heterozygous factor V Leyden mutation patient is currently on Eliquis.  Patient is here for follow-up appointment today.  States she has MRSA infection involving left elbow region for which she is scheduled for surgery later this week.  Continues to have easy bruising.  Denies any bleeding.  Denies any new lymph node enlargement.  Complains of fatigue.          Past Medical History, Past Surgical History, Social History, Family History have been reviewed and are without significant changes except as mentioned.    Review of Systems   A comprehensive 14 point review of systems was performed and was negative except as mentioned in HPI.    Medications:  The current medication list was reviewed in the EMR    ALLERGIES:    Allergies   Allergen Reactions   • Adhesive Tape Unknown - Low Severity and Other (See Comments)     Tares skin   Tares skin   Tares skin    • Bactrim [Sulfamethoxazole-Trimethoprim] Hives   • Ciprofloxacin Hives   • Latex Rash     Only when pt wears latex gloves        Objective      Vitals:    09/12/22 1547   BP: 112/79   Pulse: 110   Temp: 97.4 °F (36.3 °C)   SpO2: 97%   Weight: 69.6 kg (153 lb 6.4 oz)   PainSc: 0-No pain     No flowsheet data found.    Physical Exam  Pulmonary:      Breath sounds: Normal breath sounds.   Neurological:      Mental Status: She is alert and oriented to person, place, and time.           RECENT LABS:  Glucose   Date  Value Ref Range Status   05/31/2022 93 65 - 99 mg/dL Final     Glucose, Arterial   Date Value Ref Range Status   10/24/2020 137 (H) 65 - 95 mmol/L Final     Sodium   Date Value Ref Range Status   06/30/2022 139 136 - 145 mmol/L Final   06/07/2022 138 136 - 145 mmol/L Final     Potassium   Date Value Ref Range Status   06/30/2022 3.8 3.5 - 5.1 mmol/L Final   06/07/2022 4.3 3.5 - 5.1 mmol/L Final     Total CO2   Date Value Ref Range Status   06/07/2022 33 (H) 21 - 31 mmol/L Final     Chloride   Date Value Ref Range Status   06/30/2022 102 98 - 107 mmol/L Final   06/07/2022 97 (L) 98 - 107 mmol/L Final     Anion Gap   Date Value Ref Range Status   05/31/2022 9.0 5.0 - 15.0 mmol/L Final     Creatinine   Date Value Ref Range Status   06/30/2022 0.8 0.6 - 1.0 mg/dL Final   06/07/2022 0.6 (L) 0.7 - 1.3 mg/dL Final     BUN   Date Value Ref Range Status   06/30/2022 20 (H) 7 - 18 mg/dL Final     BUN/Creatinine Ratio   Date Value Ref Range Status   05/31/2022 43.8 (H) 7.0 - 25.0 Final     Calcium   Date Value Ref Range Status   06/30/2022 9.3 8.5 - 10.1 mg/dL Final     eGFR Non  Amer   Date Value Ref Range Status   10/26/2020 101 >60 mL/min/1.73 Final     Alkaline Phosphatase   Date Value Ref Range Status   06/30/2022 50 46 - 116 U/L Final     Total Protein   Date Value Ref Range Status   06/30/2022 7.0 6.4 - 8.2 g/dL Final     ALT (SGPT)   Date Value Ref Range Status   06/30/2022 14 14 - 59 U/L Final     AST (SGOT)   Date Value Ref Range Status   06/30/2022 5 (L) 15 - 37 U/L Final     Total Bilirubin   Date Value Ref Range Status   06/30/2022 0.20 0.20 - 1.00 mg/dL Final     Albumin   Date Value Ref Range Status   06/30/2022 3.4 3.4 - 5.0 g/dL Final     Globulin   Date Value Ref Range Status   05/28/2022 3.2 gm/dL Final     Lab Results   Component Value Date    WBC 15.57 (H) 09/12/2022    HGB 14.9 09/12/2022    HCT 46.4 09/12/2022    MCV 92.2 09/12/2022     09/12/2022     Lab Results   Component Value Date     NEUTROABS 9.50 (H) 09/12/2022    IRON 47 06/17/2022    IRON 21 (L) 04/01/2021    TIBC 451 06/17/2022    TIBC 550 (H) 04/01/2021    LABIRON 10 (L) 06/17/2022    LABIRON 4 (L) 04/01/2021    FERRITIN 29.21 06/17/2022    FERRITIN 9.38 (L) 04/01/2021    [id number] 439 06/17/2022    [id number] 289 01/14/2022    [id number] 294 03/10/2021    FOLATE 8.52 06/17/2022    FOLATE 7.93 04/01/2021     Lab Results   Component Value Date    2TBJH06BBZ 5.4 08/20/2020            Factor 5 Leiden  on June 17, 2022 showed:     Specimen Information: Blood         0 Result Notes    Component   Ref Range & Units 2 mo ago    Factor V Leiden   Normal Heterozygous Abnormal     Resulting Agency  [facility]             Narrative  Performed by: [facility]  Factor V Leiden is a specific mutation ([id number]) in the factor V gene that is associated with an increased risk of venous thrombosis.  Factor V Leiden is more resistant to inactivation by activated protein C.  Factor V Leiden refers to the G to A transition at nucleotide position 1691 of the Factor V gene, resulting in the substitution of the amino acid arginine by glutamine in the Factor V protein, causing resistance to cleavage by Activated Protein C (APC).                    PATHOLOGY:  * Cannot find OR log *         RADIOLOGY DATA :  No radiology results for the last 7 days        Assessment & Plan     1.  DVT with bilateral pulmonary embolism  - Patient was diagnosed with DVT involving posterior calf vein as well as bilateral pulmonary embolism in June 2022.  - Due to family history of her mother, patient had testing with factor V Leyden, prothrombin gene mutation beta-2 glycoprotein antibodies and antithyroglobulin negative antibodies in June 2022.  - Patient was found to have heterozygous mutation including factor V Leiden on June 17, 2022.  - Recommend continue with lifelong anticoagulation due to heterozygous factor V Leyden mutation.  - Since patient is scheduled for surgery later this  week recommend holding Eliquis for 3 days prior to surgery and resuming Eliquis as soon as cleared by surgery team after surgery.  - We will have patient return to clinic in 3 months with repeat CBC, CMP, iron studies, ferritin B12 and folate to be done on that day.    2.  Anemia:  - Patient is currently on ferrous sulfate, B12 and folic acid p.o. daily.  - Anemia work-up done today shows hemoglobin is 14.9.  Iron studies are showing improvement we will continue with ferrous sulfate 1 tablet p.o. daily.  B12 and folate levels are showing significant improvement will decrease B12 and folic acid to 3 times a week.    3.  Leukocytosis:  - Multifactorial secondary to chronic prednisone use plus or minus inflammation  - White blood cell count is 15.57 which is predominantly increase in neutrophils    4.  Thrombocytosis:  - Reactive due to iron deficiency  - Platelet count is 255,000  - We will monitor with CBC    5.  Cushing's disease on prednisone    6.  Health maintenance: Patient had quit smoking in May 2022    7. Advance Care Planning: For now patient remains full code and is able to make decisions.  Patient has health care surrogate mentioned on chart.                 PHQ-9 Total Score: 2   -Patient is not homicidal or suicidal.  No acute intervention required.    Jeanie Jones reports a pain score of 0.  Given her pain assessment as noted, treatment options were discussed and the following options were decided upon as a follow-up plan to address the patient's pain: continuation of current treatment plan for pain.         Darnell Reyes MD  9/12/2022  15:52 CDT        Part of this note may be an electronic transcription/translation of spoken language to printed text using the Dragon Dictation System.          CC:

## 2022-09-13 ENCOUNTER — TELEPHONE (OUTPATIENT)
Dept: ONCOLOGY | Facility: HOSPITAL | Age: 47
End: 2022-09-13

## 2022-09-13 NOTE — TELEPHONE ENCOUNTER
Called and spoke with pt regarding lab results and medication instructions as per Dr. Reyes, v/u obtained.

## 2022-09-13 NOTE — TELEPHONE ENCOUNTER
----- Message from Darnell Reyes MD sent at 9/13/2022  6:58 AM CDT -----  Regarding: Labs  Please let patient know, she needs to continue taking iron tablet p.o. daily.  B12 and folate levels are showing significant improvement, she can decrease B12 and folic acid to 3 times a week.  Thank you

## 2022-09-22 RX ORDER — FLASH GLUCOSE SENSOR
KIT MISCELLANEOUS
Qty: 2 EACH | Refills: 11 | Status: SHIPPED | OUTPATIENT
Start: 2022-09-22

## 2022-09-26 NOTE — TELEPHONE ENCOUNTER
Rx Refill Note  Requested Prescriptions     Pending Prescriptions Disp Refills   • apixaban (Eliquis) 5 MG tablet tablet [Pharmacy Med Name: ELIQUIS 5 MG TABLET 5 Tablet] 60 tablet 0     Sig: TAKE 1 TABLET BY MOUTH TWICE DAILY      Last office visit with prescribing clinician: 9/12/2022      Next office visit with prescribing clinician: 12/12/2022            Monique Cho Rep  09/26/22, 12:53 CDT

## 2022-10-18 ENCOUNTER — TELEMEDICINE (OUTPATIENT)
Dept: ENDOCRINOLOGY | Facility: CLINIC | Age: 47
End: 2022-10-18

## 2022-10-18 DIAGNOSIS — Z79.4 TYPE 2 DIABETES MELLITUS WITH HYPERGLYCEMIA, WITH LONG-TERM CURRENT USE OF INSULIN: Primary | ICD-10-CM

## 2022-10-18 DIAGNOSIS — E11.65 TYPE 2 DIABETES MELLITUS WITH HYPERGLYCEMIA, WITH LONG-TERM CURRENT USE OF INSULIN: Primary | ICD-10-CM

## 2022-10-18 DIAGNOSIS — B37.9 YEAST INFECTION: ICD-10-CM

## 2022-10-18 DIAGNOSIS — R21 RASH: ICD-10-CM

## 2022-10-18 DIAGNOSIS — E55.9 VITAMIN D DEFICIENCY: ICD-10-CM

## 2022-10-18 PROCEDURE — 99214 OFFICE O/P EST MOD 30 MIN: CPT | Performed by: NURSE PRACTITIONER

## 2022-10-18 RX ORDER — FLUCONAZOLE 150 MG/1
TABLET ORAL
Qty: 2 TABLET | Refills: 1 | Status: SHIPPED | OUTPATIENT
Start: 2022-10-18 | End: 2022-10-26

## 2022-10-18 NOTE — PROGRESS NOTES
Chief Complaint  Diabetes    Subjective          Jeanie Anjelica Jones presents to [facility]  History of Present Illness       You have chosen to receive care through a telehealth visit.  Do you consent to use a video/audio connection for your medical care today? Yes            TELEHEALTH VIDEO VISIT     This a video visit due to [facility] current guidelines for social distancing due to the COVID 19 pandemic        Mode of Visit: Video  Location of patient: home  You have chosen to receive care through a telehealth visit.  The patient has signed the video visit consent form.  The visit included audio and video interaction. No technical issues occurred during this visit.             Primary provider Parth Salas, LEILANI        47 year old female presents for follow up      Reason diabetes secondary to steroid use     Diagnosed in 2019      Timing constant     Quality not controlled       Severity moderate          Aggravating factors --steroid use         Blood glucose readings      She checks 4 times daily and has been for over 90 days       Tracee personal use -states back in to range      Has omni pod but her PDM is is malfunctioned        ---------------------------------------------------------------------------        RASH      Has been treated with prednisone for about 5 years      Rash is on the arms and legs     It is widespread, she does have itching      alleviating factors -- steroids and atarax      Modifying factors include the use of steroids and Atarax          Review of Systems - General ROS: negative            Objective   Vital Signs:   There were no vitals taken for this visit.    Physical Exam  Neurological:      General: No focal deficit present.      Mental Status: She is alert.   Psychiatric:         Mood and Affect: Mood normal.         Thought Content: Thought content normal.         Judgment: Judgment normal.        Result Review :   The following data was  reviewed by: LEILANI Aguilar on 03/29/2022:  Common labs    Common Labs 6/30/22 9/12/22 9/12/22 9/26/22 9/26/22 9/26/22     1540 1540 0914 0914 0914   Glucose   154 (A)      Glucose 235 (A)   91     BUN 20 (A)  14 24     Creatinine 0.8  0.52 (A) 0.6 (A)     Sodium 139  142 141     Potassium 3.8  3.5 3.6     Chloride 102  103 101     Calcium 9.3  8.6 9.0     Albumin 3.4  4.00 3.8     Total Bilirubin 0.20  0.2 0.31     Alkaline Phosphatase 50  39 36     AST (SGOT) 5 (A)  18 10 (A)     ALT (SGPT) 14  20 15     WBC  15.57 (A)       Hemoglobin  14.9       Hematocrit  46.4       Platelets  255       Total Cholesterol     120    Triglycerides     155 (A)    HDL Cholesterol     40    LDL Cholesterol      49    Hemoglobin A1C      9.2 (A)   (A) Abnormal value       Comments are available for some flowsheets but are not being displayed.                       Assessment and Plan    Diagnoses and all orders for this visit:    1. Type 2 diabetes mellitus with hyperglycemia, with long-term current use of insulin (HCC) (Primary)    2. Rash    3. Vitamin D deficiency    4. Yeast infection    Other orders  -     fluconazole (Diflucan) 150 MG tablet; Take one today and repeat in 7 days  Dispense: 2 tablet; Refill: 1           Rash ---            Using atarax 50 mg tid --continue       Rash does not appear to be endocrine related      Component      Latest Ref Rng & Units 8/18/2020 8/20/2020   Creatinine, 24H       0.70 - 1.60 g/24 hr   0.66 (L)   Creatinine, Urine      mg/dL   36.9   Urine Volume      mL   1,800   Time (Hours)      hrs   24   5-HIAA, Urine      Undefined mg/L   3.0   5-HIAA, 24H Ur      0.0 - 14.9 mg/24 hr   5.4   Calcitonin      0.0 - 5.0 pg/mL <2.0     Insulin-Like Growth Factor-1      74 - 239 ng/mL 94     MANUEL Direct      Negative Negative     Uric Acid      2.4 - 5.7 mg/dL 2.6     C-Reactive Protein      0.00 - 0.50 mg/dL 0.36     Sed Rate      0 - 20 mm/hr 2     C-Peptide      1.1 - 4.4 ng/mL 5.4 (H)                        -----------        Cushing's medication induced / adrenal insufficiency            Prednisone            Now on 5 mg tablets       taking one am and one pm      If fever , nausea or vomiting , take 100 mg IM and head to ER                    ------------     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     Lab Results   Component Value Date    HGBA1C 9.2 (H) 09/26/2022             Taking invokana 100 mg daily             Now on Omni pod pump---- the pdm has malfunctioned       She is injecting until she gets the new PDM        The goal is to have a sugar 2 hours after eating less than 180            Taking Ozempic 0.5 mg once weekly                She now has a [employer] personal system     She uses the [employer] data to adjust her insulin dosages           Bone health     Vitamin d def      Taking replacement    Yeast infection    Called in diflucan               Follow Up   No follow-ups on file.  Patient was given instructions and counseling regarding her condition or for health maintenance advice. Please see specific information pulled into the AVS if appropriate.         This document has been electronically signed by LEILANI Aguilar on October 18, 2022 14:15 CDT.

## 2022-10-19 ENCOUNTER — TELEPHONE (OUTPATIENT)
Dept: ENDOCRINOLOGY | Facility: CLINIC | Age: 47
End: 2022-10-19

## 2022-10-26 RX ORDER — FLUCONAZOLE 150 MG/1
TABLET ORAL
Qty: 2 TABLET | Refills: 1 | Status: SHIPPED | OUTPATIENT
Start: 2022-10-26

## 2022-11-07 NOTE — TELEPHONE ENCOUNTER
Rx Refill Note  Requested Prescriptions     Pending Prescriptions Disp Refills   • apixaban (Eliquis) 5 MG tablet tablet [Pharmacy Med Name: ELIQUIS 5 MG TABLET 5 Tablet] 60 tablet 0     Sig: TAKE 1 TABLET BY MOUTH TWICE DAILY      Last office visit with prescribing clinician: 9/12/2022      Next office visit with prescribing clinician: 12/12/2022            Monique Cho Rep  11/07/22, 11:09 CST

## 2022-12-12 ENCOUNTER — APPOINTMENT (OUTPATIENT)
Dept: ONCOLOGY | Facility: HOSPITAL | Age: 47
End: 2022-12-12

## 2022-12-12 ENCOUNTER — APPOINTMENT (OUTPATIENT)
Dept: ONCOLOGY | Facility: CLINIC | Age: 47
End: 2022-12-12

## 2022-12-13 RX ORDER — PREDNISONE 1 MG/1
TABLET ORAL
Qty: 360 TABLET | Refills: 11 | Status: SHIPPED | OUTPATIENT
Start: 2022-12-13

## 2022-12-16 RX ORDER — LINACLOTIDE 145 UG/1
CAPSULE, GELATIN COATED ORAL
Qty: 30 CAPSULE | Refills: 5 | OUTPATIENT
Start: 2022-12-16

## 2023-01-04 ENCOUNTER — APPOINTMENT (OUTPATIENT)
Dept: ONCOLOGY | Facility: HOSPITAL | Age: 48
End: 2023-01-04
Payer: MEDICARE

## 2023-01-11 DIAGNOSIS — E11.65 TYPE 2 DIABETES MELLITUS WITH HYPERGLYCEMIA, WITH LONG-TERM CURRENT USE OF INSULIN: ICD-10-CM

## 2023-01-11 DIAGNOSIS — Z79.4 TYPE 2 DIABETES MELLITUS WITH HYPERGLYCEMIA, WITH LONG-TERM CURRENT USE OF INSULIN: ICD-10-CM

## 2023-01-11 RX ORDER — CANAGLIFLOZIN 100 MG/1
TABLET, FILM COATED ORAL
Qty: 30 TABLET | Refills: 5 | Status: SHIPPED | OUTPATIENT
Start: 2023-01-11

## 2023-01-31 ENCOUNTER — PATIENT MESSAGE (OUTPATIENT)
Dept: ENDOCRINOLOGY | Facility: CLINIC | Age: 48
End: 2023-01-31
Payer: MEDICARE

## 2023-01-31 ENCOUNTER — TELEMEDICINE (OUTPATIENT)
Dept: ENDOCRINOLOGY | Facility: CLINIC | Age: 48
End: 2023-01-31
Payer: MEDICARE

## 2023-01-31 DIAGNOSIS — R21 RASH: ICD-10-CM

## 2023-01-31 DIAGNOSIS — E11.65 TYPE 2 DIABETES MELLITUS WITH HYPERGLYCEMIA, WITH LONG-TERM CURRENT USE OF INSULIN: Primary | ICD-10-CM

## 2023-01-31 DIAGNOSIS — Z79.4 TYPE 2 DIABETES MELLITUS WITH HYPERGLYCEMIA, WITH LONG-TERM CURRENT USE OF INSULIN: Primary | ICD-10-CM

## 2023-01-31 DIAGNOSIS — E55.9 VITAMIN D DEFICIENCY: ICD-10-CM

## 2023-01-31 PROCEDURE — 99214 OFFICE O/P EST MOD 30 MIN: CPT | Performed by: NURSE PRACTITIONER

## 2023-01-31 RX ORDER — SEMAGLUTIDE 1.34 MG/ML
1 INJECTION, SOLUTION SUBCUTANEOUS WEEKLY
Qty: 9 ML | Refills: 11 | Status: SHIPPED | OUTPATIENT
Start: 2023-01-31

## 2023-01-31 NOTE — PROGRESS NOTES
Chief Complaint  Diabetes    Subjective          Jeanie Anjelica Jones presents to [facility]  History of Present Illness       You have chosen to receive care through a telehealth visit.  Do you consent to use a video/audio connection for your medical care today? Yes            TELEHEALTH VIDEO VISIT     This a video visit due to [facility] current guidelines for social distancing due to the COVID 19 pandemic        Mode of Visit: Video  Location of patient: home  You have chosen to receive care through a telehealth visit.  The patient has signed the video visit consent form.  The visit included audio and video interaction. No technical issues occurred during this visit.             Primary provider LEILANI Gallagher        47 year old female presents for follow up      Reason diabetes secondary to steroid use     Diagnosed in 2019      Timing constant     Quality not controlled       Severity moderate          Aggravating factors --steroid use         Blood glucose readings      She checks 4 times daily and has been for over 90 days       Tracee personal use -states back in to range      Has omni pod but her PDM is lost         She was recently admitted for sepsis            ---------------------------------------------------------------------------        RASH      Has been treated with prednisone for about 5 years      Rash is on the arms and legs     It is widespread, she does have itching      alleviating factors -- steroids and atarax      Modifying factors include the use of steroids and Atarax          Review of Systems - General ROS: negative            Objective   Vital Signs:   There were no vitals taken for this visit.    Physical Exam  Neurological:      General: No focal deficit present.      Mental Status: She is alert.   Psychiatric:         Mood and Affect: Mood normal.         Thought Content: Thought content normal.         Judgment: Judgment normal.         Result Review :   The following data was reviewed by: LEILANI Aguilar on 03/29/2022:  Common labs    Common Labs 1/11/23 1/18/23 1/25/23   Creatinine 0.7 0.8 0.4 (A)   (A) Abnormal value                        Assessment and Plan    Diagnoses and all orders for this visit:    1. Type 2 diabetes mellitus with hyperglycemia, with long-term current use of insulin (HCC) (Primary)    2. Vitamin D deficiency    3. Rash    Other orders  -     Semaglutide, 1 MG/DOSE, (Ozempic, 1 MG/DOSE,) 4 MG/3ML solution pen-injector; Inject 1 mg under the skin into the appropriate area as directed 1 (One) Time Per Week.  Dispense: 9 mL; Refill: 11           Rash ---            Using atarax 50 mg tid --continue       Rash does not appear to be endocrine related      Component      Latest Ref Rng & Units 8/18/2020 8/20/2020   Creatinine, 24H       0.70 - 1.60 g/24 hr   0.66 (L)   Creatinine, Urine      mg/dL   36.9   Urine Volume      mL   1,800   Time (Hours)      hrs   24   5-HIAA, Urine      Undefined mg/L   3.0   5-HIAA, 24H Ur      0.0 - 14.9 mg/24 hr   5.4   Calcitonin      0.0 - 5.0 pg/mL <2.0     Insulin-Like Growth Factor-1      74 - 239 ng/mL 94     MANUEL Direct      Negative Negative     Uric Acid      2.4 - 5.7 mg/dL 2.6     C-Reactive Protein      0.00 - 0.50 mg/dL 0.36     Sed Rate      0 - 20 mm/hr 2     C-Peptide      1.1 - 4.4 ng/mL 5.4 (H)                       -----------        Cushing's medication induced / adrenal insufficiency            Prednisone             on 5 mg tablets       taking one am and one pm      If fever , nausea or vomiting , take 100 mg IM and head to ER                    ------------     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     Lab Results   Component Value Date    HGBA1C 10.6 (H) 12/29/2022             Taking invokana 100 mg daily              Omni pod pump----not using she lost her pdm     Will call omnipod to see if she can get a replacement pdm     She is injecting until she gets the new  PDM        The goal is to have a sugar 2 hours after eating less than 180            Taking Ozempic 0.5 mg once weekly --increase to 1 mg weekly    Add Humalog before meals           Sliding scale     151-200   Give 2 units  201-250  Give 4 units  251-300   Give 6 units   301 -350   Give 8 units   Above 351   Give 10 units                She now has a [employer] personal system     She uses the [employer] data to adjust her insulin dosages           Bone health     Vitamin d def      Taking replacement                  Follow Up   No follow-ups on file.  Patient was given instructions and counseling regarding her condition or for health maintenance advice. Please see specific information pulled into the AVS if appropriate.         This document has been electronically signed by LEILANI Aguilar on January 31, 2023 11:37 CST.

## 2023-02-03 NOTE — TELEPHONE ENCOUNTER
Rx Refill Note  Requested Prescriptions     Pending Prescriptions Disp Refills   • apixaban (Eliquis) 5 MG tablet tablet [Pharmacy Med Name: ELIQUIS 5 MG TABLET 5 Tablet] 60 tablet 0     Sig: TAKE 2 TABS BY MOUTH TWICE A DAY FOR 4 DAYS THEN START TAKING 1 TAB BY MOUTH TWICE A DAY THERAFTER INDICATIONS: DVT/PE (ACTIVE THROMBOSIS)      Last office visit with prescribing clinician: 9/12/2022   Last telemedicine visit with prescribing clinician: Visit date not found   Next office visit with prescribing clinician: Visit date not found                         Would you like a call back once the refill request has been completed: [] Yes [] No    If the office needs to give you a call back, can they leave a voicemail: [] Yes [] No    Monique Cho Rep  02/03/23, 13:37 CST

## 2023-02-09 NOTE — TELEPHONE ENCOUNTER
Rx Refill Note  Requested Prescriptions     Pending Prescriptions Disp Refills   • apixaban (Eliquis) 5 MG tablet tablet 60 tablet 0     Sig: TAKE 1 TABLET BY MOUTH TWICE DAILY      Last office visit with prescribing clinician: 9/12/2022   Last telemedicine visit with prescribing clinician: 2/28/2023   Next office visit with prescribing clinician: 2/9/2023                         Would you like a call back once the refill request has been completed: [] Yes [] No    If the office needs to give you a call back, can they leave a voicemail: [] Yes [] No    Monique Cho Rep  02/09/23, 14:54 CST

## 2023-02-09 NOTE — TELEPHONE ENCOUNTER
Incoming Refill Request      Medication requested (name and dose): WISHI     Pharmacy where request should be sent: [facility] [facility]     Additional details provided by patient: Pt was in ICU when she missed our appt in Jan. I have reschd her Appt for 2/28. She is having surgery tomorrow and asked for a few weeks to recover     Best call back number: [phone]    Does the patient have less than a 3 day supply:  [x] Yes  [] No    Enedina Goyal  02/09/23, 14:26 CST

## 2023-03-14 NOTE — TELEPHONE ENCOUNTER
Rx Refill Note  Requested Prescriptions     Pending Prescriptions Disp Refills   • apixaban (Eliquis) 5 MG tablet tablet [Pharmacy Med Name: ELIQUIS 5 MG TABLET 5 Tablet] 60 tablet 0     Sig: TAKE 1 TABLET BY MOUTH TWICE DAILY      Last office visit with prescribing clinician: 9/12/2022   Last telemedicine visit with prescribing clinician: Visit date not found   Next office visit with prescribing clinician: Visit date not found                         Would you like a call back once the refill request has been completed: [] Yes [] No    If the office needs to give you a call back, can they leave a voicemail: [] Yes [] No    Myriam Siu  03/14/23, 12:26 CDT

## 2023-03-24 ENCOUNTER — LAB (OUTPATIENT)
Dept: ONCOLOGY | Facility: HOSPITAL | Age: 48
End: 2023-03-24
Payer: MEDICARE

## 2023-03-24 ENCOUNTER — OFFICE VISIT (OUTPATIENT)
Dept: ONCOLOGY | Facility: CLINIC | Age: 48
End: 2023-03-24
Payer: MEDICARE

## 2023-03-24 VITALS
WEIGHT: 140.2 LBS | HEART RATE: 116 BPM | BODY MASS INDEX: 22.64 KG/M2 | SYSTOLIC BLOOD PRESSURE: 118 MMHG | OXYGEN SATURATION: 96 % | TEMPERATURE: 97.7 F | DIASTOLIC BLOOD PRESSURE: 78 MMHG

## 2023-03-24 DIAGNOSIS — D75.839 THROMBOCYTOSIS: ICD-10-CM

## 2023-03-24 DIAGNOSIS — E61.1 IRON DEFICIENCY: Chronic | ICD-10-CM

## 2023-03-24 DIAGNOSIS — E61.1 IRON DEFICIENCY: ICD-10-CM

## 2023-03-24 DIAGNOSIS — D72.828 OTHER ELEVATED WHITE BLOOD CELL (WBC) COUNT: ICD-10-CM

## 2023-03-24 DIAGNOSIS — D72.828 OTHER ELEVATED WHITE BLOOD CELL (WBC) COUNT: Primary | Chronic | ICD-10-CM

## 2023-03-24 DIAGNOSIS — D75.839 THROMBOCYTOSIS: Chronic | ICD-10-CM

## 2023-03-24 DIAGNOSIS — Z86.718 PERSONAL HISTORY OF OTHER VENOUS THROMBOSIS AND EMBOLISM: ICD-10-CM

## 2023-03-24 LAB
ALBUMIN SERPL-MCNC: 3.9 G/DL (ref 3.5–5.2)
ALBUMIN/GLOB SERPL: 1.6 G/DL
ALP SERPL-CCNC: 75 U/L (ref 39–117)
ALT SERPL W P-5'-P-CCNC: 16 U/L (ref 1–33)
ANION GAP SERPL CALCULATED.3IONS-SCNC: 16 MMOL/L (ref 5–15)
AST SERPL-CCNC: 13 U/L (ref 1–32)
BASOPHILS # BLD AUTO: 0.1 10*3/MM3 (ref 0–0.2)
BASOPHILS NFR BLD AUTO: 0.7 % (ref 0–1.5)
BILIRUB SERPL-MCNC: <0.2 MG/DL (ref 0–1.2)
BUN SERPL-MCNC: 17 MG/DL (ref 6–20)
BUN/CREAT SERPL: 30.9 (ref 7–25)
CALCIUM SPEC-SCNC: 8.9 MG/DL (ref 8.6–10.5)
CHLORIDE SERPL-SCNC: 99 MMOL/L (ref 98–107)
CO2 SERPL-SCNC: 22 MMOL/L (ref 22–29)
CREAT SERPL-MCNC: 0.55 MG/DL (ref 0.57–1)
DEPRECATED RDW RBC AUTO: 48.9 FL (ref 37–54)
EGFRCR SERPLBLD CKD-EPI 2021: 115.4 ML/MIN/1.73
EOSINOPHIL # BLD AUTO: 0.35 10*3/MM3 (ref 0–0.4)
EOSINOPHIL NFR BLD AUTO: 2.6 % (ref 0.3–6.2)
ERYTHROCYTE [DISTWIDTH] IN BLOOD BY AUTOMATED COUNT: 16 % (ref 12.3–15.4)
FERRITIN SERPL-MCNC: 22.07 NG/ML (ref 13–150)
FOLATE SERPL-MCNC: 10.5 NG/ML (ref 4.78–24.2)
GLOBULIN UR ELPH-MCNC: 2.5 GM/DL
GLUCOSE SERPL-MCNC: 200 MG/DL (ref 65–99)
HCT VFR BLD AUTO: 42.8 % (ref 34–46.6)
HGB BLD-MCNC: 13.3 G/DL (ref 12–15.9)
IMM GRANULOCYTES # BLD AUTO: 0.13 10*3/MM3 (ref 0–0.05)
IMM GRANULOCYTES NFR BLD AUTO: 1 % (ref 0–0.5)
IRON 24H UR-MRATE: 59 MCG/DL (ref 37–145)
IRON SATN MFR SERPL: 12 % (ref 20–50)
LYMPHOCYTES # BLD AUTO: 3.49 10*3/MM3 (ref 0.7–3.1)
LYMPHOCYTES NFR BLD AUTO: 26 % (ref 19.6–45.3)
MCH RBC QN AUTO: 26.1 PG (ref 26.6–33)
MCHC RBC AUTO-ENTMCNC: 31.1 G/DL (ref 31.5–35.7)
MCV RBC AUTO: 84.1 FL (ref 79–97)
MONOCYTES # BLD AUTO: 1.07 10*3/MM3 (ref 0.1–0.9)
MONOCYTES NFR BLD AUTO: 8 % (ref 5–12)
NEUTROPHILS NFR BLD AUTO: 61.7 % (ref 42.7–76)
NEUTROPHILS NFR BLD AUTO: 8.26 10*3/MM3 (ref 1.7–7)
NRBC BLD AUTO-RTO: 0 /100 WBC (ref 0–0.2)
PLATELET # BLD AUTO: 370 10*3/MM3 (ref 140–450)
PMV BLD AUTO: 8.8 FL (ref 6–12)
POTASSIUM SERPL-SCNC: 3.4 MMOL/L (ref 3.5–5.2)
PROT SERPL-MCNC: 6.4 G/DL (ref 6–8.5)
RBC # BLD AUTO: 5.09 10*6/MM3 (ref 3.77–5.28)
SODIUM SERPL-SCNC: 137 MMOL/L (ref 136–145)
TIBC SERPL-MCNC: 472 MCG/DL (ref 298–536)
TRANSFERRIN SERPL-MCNC: 317 MG/DL (ref 200–360)
VIT B12 BLD-MCNC: 438 PG/ML (ref 211–946)
WBC NRBC COR # BLD: 13.4 10*3/MM3 (ref 3.4–10.8)

## 2023-03-24 PROCEDURE — 1126F AMNT PAIN NOTED NONE PRSNT: CPT | Performed by: INTERNAL MEDICINE

## 2023-03-24 PROCEDURE — 1123F ACP DISCUSS/DSCN MKR DOCD: CPT | Performed by: INTERNAL MEDICINE

## 2023-03-24 PROCEDURE — 83540 ASSAY OF IRON: CPT

## 2023-03-24 PROCEDURE — 99406 BEHAV CHNG SMOKING 3-10 MIN: CPT | Performed by: INTERNAL MEDICINE

## 2023-03-24 PROCEDURE — 85025 COMPLETE CBC W/AUTO DIFF WBC: CPT

## 2023-03-24 PROCEDURE — 82746 ASSAY OF FOLIC ACID SERUM: CPT

## 2023-03-24 PROCEDURE — 82728 ASSAY OF FERRITIN: CPT

## 2023-03-24 PROCEDURE — 84466 ASSAY OF TRANSFERRIN: CPT

## 2023-03-24 PROCEDURE — 99214 OFFICE O/P EST MOD 30 MIN: CPT | Performed by: INTERNAL MEDICINE

## 2023-03-24 PROCEDURE — G9902 PT SCRN TBCO AND ID AS USER: HCPCS | Performed by: INTERNAL MEDICINE

## 2023-03-24 PROCEDURE — 80053 COMPREHEN METABOLIC PANEL: CPT

## 2023-03-24 PROCEDURE — G0463 HOSPITAL OUTPT CLINIC VISIT: HCPCS | Performed by: INTERNAL MEDICINE

## 2023-03-24 PROCEDURE — 82607 VITAMIN B-12: CPT

## 2023-03-24 RX ORDER — POTASSIUM CHLORIDE 750 MG/1
10 TABLET, FILM COATED, EXTENDED RELEASE ORAL
COMMUNITY
Start: 2023-03-09

## 2023-03-24 RX ORDER — FAMOTIDINE 20 MG/1
20 TABLET, FILM COATED ORAL
COMMUNITY
Start: 2022-09-27 | End: 2023-03-24

## 2023-03-24 RX ORDER — DAPTOMYCIN 50 MG/ML
INJECTION, POWDER, LYOPHILIZED, FOR SOLUTION INTRAVENOUS
COMMUNITY
Start: 2023-03-20

## 2023-03-24 RX ORDER — OXYCODONE HYDROCHLORIDE AND ACETAMINOPHEN 5; 325 MG/1; MG/1
TABLET ORAL
COMMUNITY
Start: 2023-02-10

## 2023-03-24 RX ORDER — HEPARIN SODIUM 5000 [USP'U]/.5ML
5 INJECTION, SOLUTION INTRAVENOUS; SUBCUTANEOUS
COMMUNITY

## 2023-03-24 RX ORDER — MAGNESIUM OXIDE 400 MG/1
400 TABLET ORAL DAILY
Qty: 30 TABLET | Refills: 11 | COMMUNITY
Start: 2022-09-27 | End: 2023-09-28

## 2023-03-24 NOTE — PROGRESS NOTES
DATE OF VISIT: 3/25/2023      REASON FOR VISIT: Leukocytosis, thrombocytosis, DVT and pulmonary embolism on Eliquis, iron deficiency      HISTORY OF PRESENT ILLNESS:   47-year-old female with medical problem consisting of hypertension, dyslipidemia, diabetes mellitus, anxiety/depression, Cushing's disease for which patient is on chronic prednisone, history of kidney stone, gastric surgery for ulceration in October 2020 was initially seen in consultation on April 1, 2022.  Patient was diagnosed with DVT and pulmonary embolism on May 28, 2022 with heterozygous factor V Leyden mutation for which patient is currently on Eliquis.  Patient was last seen here at clinic on September 12, 2022 after that she was lost to follow-up.  Complains of easy bruising.  Complains of fatigue.  Denies any bleeding.  Denies any lymph node enlargement.              Past Medical History, Past Surgical History, Social History, Family History have been reviewed and are without significant changes except as mentioned.    Review of Systems   A comprehensive 14 point review of systems was performed and was negative except as mentioned in HPI.    Medications:  The current medication list was reviewed in the EMR    ALLERGIES:    Allergies   Allergen Reactions   • Adhesive Tape Unknown - Low Severity and Other (See Comments)     Tares skin   Tares skin   Tares skin    • Bactrim [Sulfamethoxazole-Trimethoprim] Hives   • Ciprofloxacin Hives   • Latex Rash     Only when pt wears latex gloves        Objective      Vitals:    03/24/23 0920   BP: 118/78   Pulse: 116   Temp: 97.7 °F (36.5 °C)   TempSrc: Temporal   SpO2: 96%   Weight: 63.6 kg (140 lb 3.2 oz)   PainSc: 0-No pain     No flowsheet data found.    Physical Exam  Pulmonary:      Breath sounds: Normal breath sounds.   Neurological:      Mental Status: She is alert and oriented to person, place, and time.           RECENT LABS:  Glucose   Date Value Ref Range Status   03/24/2023 200 (H) 65 - 99  mg/dL Final     Glucose, Arterial   Date Value Ref Range Status   10/24/2020 137 (H) 65 - 95 mmol/L Final     Sodium   Date Value Ref Range Status   03/24/2023 137 136 - 145 mmol/L Final   03/08/2023 139 136 - 145 mmol/L Final   02/12/2023 138 136 - 145 mmol/L Final     Potassium   Date Value Ref Range Status   03/24/2023 3.4 (L) 3.5 - 5.2 mmol/L Final   03/08/2023 3.1 (L) 3.5 - 5.1 mmol/L Final   02/12/2023 3.7 3.5 - 5.1 mmol/L Final     CO2   Date Value Ref Range Status   03/24/2023 22.0 22.0 - 29.0 mmol/L Final     Total CO2   Date Value Ref Range Status   02/12/2023 25 21 - 31 mmol/L Final     Chloride   Date Value Ref Range Status   03/24/2023 99 98 - 107 mmol/L Final   03/08/2023 101 98 - 107 mmol/L Final   02/12/2023 105 98 - 107 mmol/L Final     Anion Gap   Date Value Ref Range Status   03/24/2023 16.0 (H) 5.0 - 15.0 mmol/L Final   02/12/2023 8 4 - 12 mmol/L Final     Creatinine   Date Value Ref Range Status   03/24/2023 0.55 (L) 0.57 - 1.00 mg/dL Final   03/22/2023 0.6 0.6 - 1.0 mg/dL Final   02/12/2023 0.4 (L) 0.7 - 1.3 mg/dL Final     BUN   Date Value Ref Range Status   03/24/2023 17 6 - 20 mg/dL Final   03/08/2023 19 (H) 7 - 18 mg/dL Final     BUN/Creatinine Ratio   Date Value Ref Range Status   03/24/2023 30.9 (H) 7.0 - 25.0 Final     Calcium   Date Value Ref Range Status   03/24/2023 8.9 8.6 - 10.5 mg/dL Final   03/08/2023 9.2 8.5 - 10.1 mg/dL Final     eGFR Non  Amer   Date Value Ref Range Status   10/26/2020 101 >60 mL/min/1.73 Final     Alkaline Phosphatase   Date Value Ref Range Status   03/24/2023 75 39 - 117 U/L Final   03/08/2023 72 46 - 116 U/L Final     Total Protein   Date Value Ref Range Status   03/24/2023 6.4 6.0 - 8.5 g/dL Final   03/08/2023 6.5 6.4 - 8.2 g/dL Final     ALT (SGPT)   Date Value Ref Range Status   03/24/2023 16 1 - 33 U/L Final   03/08/2023 19 14 - 59 U/L Final     AST (SGOT)   Date Value Ref Range Status   03/24/2023 13 1 - 32 U/L Final   03/08/2023 14 (L) 15 - 37  U/L Final     Total Bilirubin   Date Value Ref Range Status   03/24/2023 <0.2 0.0 - 1.2 mg/dL Final   03/08/2023 0.20 0.20 - 1.00 mg/dL Final     Albumin   Date Value Ref Range Status   03/24/2023 3.9 3.5 - 5.2 g/dL Final   03/08/2023 3.2 (L) 3.4 - 5.0 g/dL Final     Globulin   Date Value Ref Range Status   03/24/2023 2.5 gm/dL Final     Lab Results   Component Value Date    WBC 13.40 (H) 03/24/2023    HGB 13.3 03/24/2023    HCT 42.8 03/24/2023    MCV 84.1 03/24/2023     03/24/2023     Lab Results   Component Value Date    NEUTROABS 8.26 (H) 03/24/2023    IRON 59 03/24/2023    IRON 76 09/12/2022    IRON 47 06/17/2022    TIBC 472 03/24/2023    TIBC 423 09/12/2022    TIBC 451 06/17/2022    LABIRON 12 (L) 03/24/2023    LABIRON 18 (L) 09/12/2022    LABIRON 10 (L) 06/17/2022    FERRITIN 22.07 03/24/2023    FERRITIN 27.72 09/12/2022    FERRITIN 29.21 06/17/2022    [id number] 438 03/24/2023    [id number] 488 03/08/2023    [id number] 671 09/26/2022    FOLATE 10.50 03/24/2023    FOLATE >20.00 09/12/2022    FOLATE 8.52 06/17/2022     Lab Results   Component Value Date    [id number] 5.4 08/20/2020         PATHOLOGY:  * Cannot find OR log *         RADIOLOGY DATA :  No radiology results for the last 7 days        Assessment & Plan     1.  DVT with bilateral pulmonary embolism  - Patient was diagnosed with DVT involving posterior As Well As Bilateral Pulmonary Embolism in June 2022  - Patient was found to have heterozygous mutation involving factor V Leyden on June 17, 2022  - Recommend lifelong anticoagulation due to heterozygous factor V Leiden mutation  - Prescription for Eliquis has been sent to her pharmacy today  - Patient will return to clinic in 3 months with repeat CBC, CMP, iron studies, ferritin, B12 and folate to be done on that day.    2.  Anemia:  - Hemoglobin is 13.3.  - Patient is supposed to be on ferrous sulfate p.o. daily with B12 and folic acid 3 times a week but states has not been taking any  supplements since last clinic visit due to recurrent hospital admission  - Anemia work-up done today shows iron saturation is only 12%, folate level is 10, B12 level is 438.  Recommend restarting ferrous sulfate, B12 and folic acid p.o. daily.  Prescription for ferrous sulfate, Colace, B12 and folic acid has been sent to patient's pharmacy today    3.  Leukocytosis:  - Secondary to chronic prednisone use plus minus inflammation  - White blood cell count today is 13.4 which is predominantly increase in neutrophils    4.  Hypokalemia  - Potassium is 3.4.  - Patient is supposed to be on potassium 20 mg p.o. daily as outpatient.  Recommend continuing with same potassium supplements.    5.  Cushing's disease on prednisone    6.  Jeanie Jones  reports that she has been smoking cigarettes. She has a 20.00 pack-year smoking history. She has never used smokeless tobacco.. I have educated her on the risk of diseases from using tobacco products such as cancer, COPD, heart disease and cataracts.     I advised her to quit and she is willing to quit. We have discussed the following method/s for tobacco cessation:  Counseling.  Together we have set a quit date for 3 months.  She will follow up with me in 3 months or sooner to check on her progress.    I spent 3.5 minutes counseling the patient.       7.  Advance care planning:  - CODE STATUS and resuscitation were discussed with patient today.  Patient remains full code.    8.  Prescriptions: Prescription for Eliquis has been sent to patient's pharmacy today.               PHQ-9 Total Score: 2   -Patient is not homicidal or suicidal.  No acute intervention required.    Jeanie Jones reports a pain score of 0.  Given her pain assessment as noted, treatment options were discussed and the following options were decided upon as a follow-up plan to address the patient's pain: continuation of current treatment plan for pain.         Darnell Reyes MD  3/25/2023  08:01  CDT        Part of this note may be an electronic transcription/translation of spoken language to printed text using the Dragon Dictation System.          CC:

## 2023-03-25 RX ORDER — DOCUSATE SODIUM 100 MG/1
100 CAPSULE, LIQUID FILLED ORAL 2 TIMES DAILY PRN
Qty: 60 CAPSULE | Refills: 2 | Status: SHIPPED | OUTPATIENT
Start: 2023-03-25

## 2023-03-25 RX ORDER — LANOLIN ALCOHOL/MO/W.PET/CERES
1000 CREAM (GRAM) TOPICAL DAILY
Qty: 90 TABLET | Refills: 1 | Status: SHIPPED | OUTPATIENT
Start: 2023-03-25

## 2023-03-25 RX ORDER — FOLIC ACID 1 MG/1
1 TABLET ORAL DAILY
Qty: 90 TABLET | Refills: 1 | Status: SHIPPED | OUTPATIENT
Start: 2023-03-25

## 2023-03-25 RX ORDER — FERROUS SULFATE 325(65) MG
325 TABLET ORAL
Qty: 30 TABLET | Refills: 3 | Status: SHIPPED | OUTPATIENT
Start: 2023-03-25

## 2023-03-27 ENCOUNTER — TELEPHONE (OUTPATIENT)
Dept: ONCOLOGY | Facility: HOSPITAL | Age: 48
End: 2023-03-27
Payer: MEDICARE

## 2023-03-27 NOTE — TELEPHONE ENCOUNTER
----- Message from Darnell Reyes MD sent at 3/25/2023  8:04 AM CDT -----  Please let patient know her potassium was borderline low at 3.4.  On her medication list she is on potassium daily.  Make sure she is taking potassium.  Her iron level, B12 and folic acid level is still low in blood.  Recommend restarting ferrous sulfate, B12 and folic acid p.o. daily.  Prescription for ferrous sulfate, Colace, B12 and folic acid has been sent to patient's pharmacy today.  Thank you

## 2023-03-30 ENCOUNTER — DOCUMENTATION (OUTPATIENT)
Dept: ENDOCRINOLOGY | Facility: CLINIC | Age: 48
End: 2023-03-30
Payer: MEDICARE

## 2023-03-30 NOTE — PROGRESS NOTES
PA FOR OMNIPOD DASH PODS SUBMITTED VIA COVER MY MEDS.    PER CMM:Available without authorization. The member is able to fill the requested drug at the pharmacy

## 2023-04-04 ENCOUNTER — DOCUMENTATION (OUTPATIENT)
Dept: ENDOCRINOLOGY | Facility: CLINIC | Age: 48
End: 2023-04-04
Payer: MEDICARE

## 2023-04-10 RX ORDER — FLUCONAZOLE 150 MG/1
TABLET ORAL
Qty: 2 TABLET | Refills: 1 | Status: SHIPPED | OUTPATIENT
Start: 2023-04-10

## 2023-05-04 ENCOUNTER — TELEPHONE (OUTPATIENT)
Dept: ENDOCRINOLOGY | Facility: CLINIC | Age: 48
End: 2023-05-04

## 2023-05-04 ENCOUNTER — TELEMEDICINE (OUTPATIENT)
Dept: ENDOCRINOLOGY | Facility: CLINIC | Age: 48
End: 2023-05-04
Payer: MEDICARE

## 2023-05-04 DIAGNOSIS — S32.020A COMPRESSION FRACTURE OF L2 VERTEBRA, INITIAL ENCOUNTER: ICD-10-CM

## 2023-05-04 DIAGNOSIS — E11.65 TYPE 2 DIABETES MELLITUS WITH HYPERGLYCEMIA, WITH LONG-TERM CURRENT USE OF INSULIN: Primary | ICD-10-CM

## 2023-05-04 DIAGNOSIS — E55.9 VITAMIN D DEFICIENCY: ICD-10-CM

## 2023-05-04 DIAGNOSIS — Z79.4 TYPE 2 DIABETES MELLITUS WITH HYPERGLYCEMIA, WITH LONG-TERM CURRENT USE OF INSULIN: Primary | ICD-10-CM

## 2023-05-04 NOTE — PROGRESS NOTES
Chief Complaint  Diabetes     Subjective          Jeanie Anjelica Jones presents to [facility]  History of Present Illness       You have chosen to receive care through a telehealth visit.  Do you consent to use a video/audio connection for your medical care today? Yes            TELEHEALTH VIDEO VISIT     This a video visit due to [facility] current guidelines for social distancing due to the COVID 19 pandemic        Mode of Visit: Video  Location of patient: home  You have chosen to receive care through a telehealth visit.  The patient has signed the video visit consent form.  The visit included audio and video interaction. No technical issues occurred during this visit.             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    Diagnosed in 2019    Timing constant    Quality not controlled    Severity moderate      Aggravating factors - steroid use     Current diabetes regimen     Insulin by omnipod     Current glucose monitoring     Checks 4 times daily     Tracee using        could not download                 ---------------------------------------------------------------------------        RASH      Has been treated with prednisone for about 5 years      Rash is on the arms and legs     It is widespread, she does have itching      alleviating factors -- steroids and atarax      Modifying factors include the use of steroids and Atarax                      Objective   Vital Signs:   There were no vitals taken for this visit.    Physical Exam  Neurological:      General: No focal deficit present.      Mental Status: She is alert.   Psychiatric:         Mood and Affect: Mood normal.         Thought Content: Thought content normal.         Judgment: Judgment normal.        Result Review :   The following data was reviewed by: LEILANI Aguilar on 03/29/2022:  Common labs        3/15/2023    14:12  3/22/2023    13:10 3/24/2023    09:07   Common Labs   Glucose   200     BUN   17     Creatinine 0.6      0.6      0.55     Sodium   137     Potassium   3.4     Chloride   99     Calcium   8.9     Albumin   3.9     Total Bilirubin   <0.2     Alkaline Phosphatase   75     AST (SGOT)   13     ALT (SGPT)   16     WBC   13.40     Hemoglobin   13.3     Hematocrit   42.8     Platelets   370         This result is from an external source.                 Assessment and Plan    Diagnoses and all orders for this visit:    1. Type 2 diabetes mellitus with hyperglycemia, with long-term current use of insulin (Primary)    2. Compression fracture of L2 vertebra, initial encounter  -     DEXA Bone Density Axial; Future    3. Vitamin D deficiency           Rash ---            Using atarax 50 mg tid --continue       Rash does not appear to be endocrine related      Component      Latest Ref Rng & Units 8/18/2020 8/20/2020   Creatinine, 24H       0.70 - 1.60 g/24 hr   0.66 (L)   Creatinine, Urine      mg/dL   36.9   Urine Volume      mL   1,800   Time (Hours)      hrs   24   5-HIAA, Urine      Undefined mg/L   3.0   5-HIAA, 24H Ur      0.0 - 14.9 mg/24 hr   5.4   Calcitonin      0.0 - 5.0 pg/mL <2.0     Insulin-Like Growth Factor-1      74 - 239 ng/mL 94     MANUEL Direct      Negative Negative     Uric Acid      2.4 - 5.7 mg/dL 2.6     C-Reactive Protein      0.00 - 0.50 mg/dL 0.36     Sed Rate      0 - 20 mm/hr 2     C-Peptide      1.1 - 4.4 ng/mL 5.4 (H)                       -----------        Cushing's medication induced / adrenal insufficiency            Prednisone             on 5 mg tablets       taking one am and one pm      If fever , nausea or vomiting , take 100 mg IM and head to ER                    ------------     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     Lab Results   Component Value Date    HGBA1C 7.7 (H) 03/08/2023             Taking invokana 100 mg daily              Omni pod pump----not using she lost her pdm     Will call  omnipod to see if she can get a replacement pdm     She is injecting until she gets the new PDM        The goal is to have a sugar 2 hours after eating less than 180            Taking Ozempic  1 mg weekly           Humalog before meals           Sliding scale     151-200   Give 2 units  201-250  Give 4 units  251-300   Give 6 units   301 -350   Give 8 units   Above 351   Give 10 units                She now has a balta personal system     She uses the [employer] data to adjust her insulin dosages           Bone health     Vitamin d def      Taking replacement      She has compression fractures currently       Start calcium plus vitamin d     Order DEXA and submit for eventiy     Most likely this is due to chronic steroid use        Follow Up   No follow-ups on file.  Patient was given instructions and counseling regarding her condition or for health maintenance advice. Please see specific information pulled into the AVS if appropriate.         This document has been electronically signed by LEILANI Aguilar on May 4, 2023 13:55 CDT.

## 2023-05-04 NOTE — TELEPHONE ENCOUNTER
HEMATOLOGY/ONCOLOGY IN-PATIENT CONSULTATION    Date of Service:9/18/2019  Patient: Tabitha Epstein   MRN: [id number]  YOB: 1992  REQUESTING PHYSICIAN: Lilliam Dial MD  PCP: Donna Sosa MD  REASON FOR CONSULT: Normocytic anemia  Diagnosis: Anemia in pregnancy.  Chief Complaint: Chest pain, exertional dyspnea    History of Present Illness:   Ms. Tabitha Epstein is a 26 year old- year old female who I am seeing for the above.  The patient has a past medical history notable for below.  Tabitha is G2, P1, 30 W pregnant female. She was admitted on 9/16/2019 to labor and delivery unit for worsening dyspnea. The patient was found to have some tachycardia. VQ scan was negative for pulmonary pleasant. She has progressively worsening anemia. The patient has received Venofer. With respect to prior pregnancies, patient did note she took oral iron supplementation in a first pregnancy. The patient denies any bruising or bleeding. She knows to be eating poorly during this pregnancy. Her weight gain has been stable as expected. Clinically, she denies any fevers or chills or night sweats. She does endorse epigastric pain and has been taking antacids. She endorses that 2 years ago, she did have gastritis and was treated empirically with antibiotics (presumed to be H. Pylori) along with PPI. The patient has no history of endoscopy.  At present, patient is symptomatic with exertional dyspnea and chest pain. I have requested orthostatic vitals. She does not be feeling dizzy.  ECOG performance status is 1.    Past Medical History:   Diagnosis Date   • Astigmatism    • Onset of menses age 16   • Vitamin D deficiency 10/15    level is 20.3       Past Surgical History:   Procedure Laterality Date   • Ectopic pregnancy surgery  03/20/2017    also left salpingectomy       ALLERGIES:  No Known Allergies    Social History     Tobacco Use   • Smoking status: Never Smoker   • Smokeless tobacco: Never Used   Substance Use Topics   •  ----- Message from LEILANI Aguilar sent at 5/4/2023  1:56 PM CDT -----  Let her know we ordered a bone density and it is not enclosed    Alcohol use: No     Alcohol/week: 0.0 standard drinks     Frequency: Never     Drinks per session: 1 or 2     Binge frequency: Never           Drug Use:    No                Family History   Problem Relation Age of Onset   • NEGATIVE FAMILY HX OF Mother    • NEGATIVE FAMILY HX OF Father         parents not together, he lives in [facility] (not much contact with him)     No family History of hematologic malignancies or thrombotic disorders    Current Facility-Administered Medications   Medication Dose Route Frequency Provider Last Rate Last Dose   • morphine injection 2 mg  2 mg Intravenous Q4H PRN Jeanmarie Gallego MD       • pantoprazole (PROTONIX) EC tablet 40 mg  40 mg Oral Nightly Jeanmarie Gallego MD   40 mg at 09/17/19 2203   • sodium chloride 0.9 % flush bag 25 mL  25 mL Intravenous PRN Rod Rizo MD       • sodium chloride (PF) 0.9 % injection 2 mL  2 mL Intracatheter 2 times per day Rod Rizo MD   2 mL at 09/18/19 0904   • famotidine (PEPCID) tablet 20 mg  20 mg Oral BID Lilliam Dial MD   20 mg at 09/18/19 0904   • calcium carbonate (TUMS) chewable tablet 500 mg  500 mg Oral Q4H PRN Lilliam Dial MD   500 mg at 09/18/19 0227   • diphenhydrAMINE (BENADRYL) capsule 25 mg  25 mg Oral Q4H PRN Lilliam Dial MD   25 mg at 09/17/19 2238   • aluminum-magnesium hydroxide-simethicone (MAALOX) 200-200-20 MG/5ML suspension 30 mL  30 mL Oral Q4H PRN Lilliam Dial MD       • acetaminophen (TYLENOL) tablet 650 mg  650 mg Oral Q4H PRN Lilliam Dial MD       • albuterol-ipratropium 2.5 mg/0.5 mg (DUONEB) nebulizer solution 3 mL  3 mL Nebulization 4x Daily Resp PRN Jeanmarie Gallego MD           REVIEW OF SYSTEMS:  CONSTITUTIONAL: As per HPI.  HEENT: Eyes: No diplopia or blurred vision. ENT: No earache, sore throat or runny nose.  CARDIOVASCULAR: No pressure, squeezing, strangling, tightness, heaviness or aching about the chest, neck, axilla or epigastrium.  RESPIRATORY: as per HPI  GASTROINTESTINAL: No nausea, vomiting or  diarrhea. Epigastric discomfort.  GENITOURINARY: No dysuria, frequency or urgency.  MUSCULOSKELETAL: No new joint pain, arthralgias  SKIN: No change in skin, hair or nails.  NEUROLOGIC: No paresthesias, fasciculations, seizures or weakness.  PSYCHIATRIC: No disorder of thought or mood.  ENDOCRINE: No heat or cold intolerance, polyuria or polydipsia.  HEMATOLOGICAL: No easy bruising or bleeding.    Physical Examination:  Vital Last Value 24 Hour Range   Temperature 98.1 °F (36.7 °C) Temp  Min: 98.1 °F (36.7 °C)  Max: 98.9 °F (37.2 °C)   Pulse 123 Pulse  Min: 115  Max: 131   Respiratory 14 Resp  Min: 14  Max: 16   Non-Invasive  Blood Pressure 110/54 (09/18/19 1017) BP  Min: 96/48  Max: 111/55   Pulse Oximetry 97 % SpO2  Min: 96 %  Max: 99 %     Vital Today Admitted   Weight 47.2 kg Weight: 47.2 kg   Height N/A Height: 4' 11\" (149.9 cm)       CONSTITUTIONAL: Alert, cooperative, oriented. Mood and affect appropriate. Appears close to chronological age. Well nourished. Well developed.  HEAD: Normocephalic; no scars.  EYES: Conjunctivae and sclerae are clear and without icterus. Pupils are reactive and equal.  ENMT: Sinuses are nontender. No oral exudates, ulcers, masses, thrush or mucositis. Oropharynx clear. Tongue normal.  NECK: Supple without masses or thyromegaly. No jugular venous distension.   HEMATOLOGIC/LYMPHATIC: No petechiae or purpura. No tender or palpable lymph nodes in the cervical, supraclavicular, axillary or inguinal area.   RESPIRATORY: Lungs are clear to auscultation without rhonchi or wheezing.   CARDIOVASCULAR: Patient has systolic flow murmur. 3/6 over the precordium.   CHEST: Chest is symmetric, without chest wall deformities.  ABDOMEN: Distended secondary to gravid uterus.Good bowel sounds. No guarding or rebound tenderness. No pulsatile masses.   BACK/SPINE: No kyphosis, scoliosis, compression fractures. Nontender to palpation.  MUSCULOSKELETAL: No tenderness or swelling, normal range of motion  without obvious weakness.  EXTREMITIES: No visible deformities, no cyanosis, clubbing or edema. Pulses 4+ and equal bilaterally.  INTEGUMENTARY: No rashes, scars, or lesions suggestive of malignancy.     Imaging:  EXAM: VENTILATION/PERFUSION LUNG STUDY     CLINICAL INFORMATION:  This is a 26-year-old pregnant woman with shortness  of breath.     COMPARISON: Chest radiographic study, obtained today     TECHNIQUE: Initially, 23.7 millicuries of  Technetium 99m DTPA aerosol was  administered via inhalation, and ventilatory images were obtained in 8  standard projections.  Subsequently, 2.6 millicuries of technetium 99m MAA  was administered intravenously to the patient, and perfusion images were  obtained in 8 standard projections.     FINDINGS:     Perfusion images demonstrate tracer activity to be distributed  homogeneously.     Ventilation images demonstrate tracer activity to be distributed  homogeneously.     No discrete ventilation-perfusion mismatches suspicious for pulmonary  emboli are seen.        IMPRESSION: This is a normal study.         Labs:  Lab Results   Component Value Date/Time    WBC 16.7 (H) 09/18/2019 08:49 AM    RBC 2.52 (L) 09/18/2019 08:49 AM    HGB 7.0 (L) 09/18/2019 11:39 AM    HCT 22.2 (L) 09/18/2019 08:49 AM    MCV 88.1 09/18/2019 08:49 AM     09/18/2019 08:49 AM    ANEUT 7.0 09/17/2019 12:10 AM    ALYMS 0.7 (L) 09/17/2019 12:10 AM    BERRY 0.3 09/17/2019 12:10 AM    AEOS 0.0 (L) 09/17/2019 12:10 AM    ABASO 0.0 09/17/2019 12:10 AM     Lab Results   Component Value Date/Time    GLUCOSE 179 (H) 09/17/2019 12:10 AM    SODIUM 138 09/17/2019 12:10 AM    POTASSIUM 3.5 09/17/2019 12:10 AM    CHLORIDE 108 (H) 09/17/2019 12:10 AM    BUN 5 (L) 09/17/2019 12:10 AM    CREATININE 0.46 (L) 09/17/2019 12:10 AM    CALCIUM 8.6 09/17/2019 12:10 AM    ALBUMIN 4.1 07/01/2017 12:49 PM    AST 21 07/01/2017 12:49 PM    ALKPT 71 07/01/2017 12:49 PM    GPT 19 07/01/2017 12:49 PM    ANIONGAP 12 09/17/2019  12:10 AM    BCRAT 11 2019 12:10 AM    GLOB 2.9 2017 02:00 PM    AGR 1.3 2017 02:00 PM       Admission on 2019   Component Date Value   • WBC 2019 8.4    • RBC 2019 2.98*   • HGB 2019 8.2*   • HCT 2019 25.8*   • MCV 2019 86.6    • MCH 2019 27.5    • MCHC 2019 31.8*   • RDW-CV 2019 12.6    • PLT 2019 186    • NRBC 2019 0    • DIFF TYPE 2019 AUTOMATED DIFFERENTIAL    • Neutrophil 2019 76    • LYMPH 2019 15    • MONO 2019 8    • EOSIN 2019 0    • BASO 2019 0    • Percent Immature Granulo* 2019 1    • Absolute Neutrophil 2019 6.3    • Absolute Lymph 2019 1.3    • Absolute Mono 2019 0.7    • Absolute Eos 2019 0.0*   • Absolute Baso 2019 0.0    • Absolute Immature Granul* 2019 0.1    • ABO/RH(D) 2019 A POSITIVE    • ANTIBODY SCREEN 2019 NEGATIVE    • CROSSMATCH  2019    • COLOR 2019 YELLOW    • APPEARANCE 2019 CLEAR    • GLUCOSE(URINE) 2019 NEGATIVE    • BILIRUBIN 2019 NEGATIVE    • KETONES 2019 40*   • SPECIFIC GRAVITY 2019 1.008    • BLOOD 2019 NEGATIVE    • pH 2019 7.5*   • PROTEIN(URINE) 2019 NEGATIVE    • UROBILINOGEN 2019 0.2    • NITRITE 2019 NEGATIVE    • LEUKOCYTE ESTERASE 2019 MODERATE*   • SPECIMEN TYPE 2019 URINE CLEAN CATCH    • Squamous EPI'S 2019 1 to 5    • RBC 2019 NONE SEEN    • WBC 2019 11 to 25    • BACTERIA 2019 NONE SEEN    • Hyaline Casts 2019 NONE SEEN    • Specimen Description 2019 URINE, CLEAN CATCH/MIDSTREAM    • SPECIAL REQUESTS 2019 Urine not received in preservative tube.  Interpret results accordingly.    • CULTURE 2019 <10,000 CFU/mL MIXED BACTERIAL DERRELL WITH NO PREDOMINATING TYPE    • REPORT STATUS 2019 FINAL    • Ventricular Rate EKG/Min* 2019 103     • Atrial Rate (BPM) 09/16/2019 103    • OK-Interval (MSEC) 09/16/2019 128    • QRS-Interval (MSEC) 09/16/2019 88    • QT-Interval (MSEC) 09/16/2019 336    • QTc 09/16/2019 440    • P Axis (Degrees) 09/16/2019 38    • R Axis (Degrees) 09/16/2019 20    • T Axis (Degrees) 09/16/2019 16    • REPORT TEXT 09/16/2019                      Value:Sinus tachycardia  Nonspecific T wave abnormality  No previous ECGs available  Confirmed by MALISSA ISAAC ANWER ([id number]) on 9/17/2019 6:56:10 AM     • Sodium 09/17/2019 138    • Potassium 09/17/2019 3.5    • Chloride 09/17/2019 108*   • Carbon Dioxide 09/17/2019 21    • Anion Gap 09/17/2019 12    • Glucose 09/17/2019 179*   • BUN 09/17/2019 5*   • Creatinine 09/17/2019 0.46*   • GFR Estimate,  Am* 09/17/2019 >90    • GFR Estimate, Non Marina* 09/17/2019 >90    • BUN/Creatinine Ratio 09/17/2019 11    • CALCIUM 09/17/2019 8.6    • WBC 09/17/2019 8.1    • RBC 09/17/2019 2.64*   • HGB 09/17/2019 7.3*   • HCT 09/17/2019 22.9*   • MCV 09/17/2019 86.7    • MCH 09/17/2019 27.7    • MCHC 09/17/2019 31.9*   • RDW-CV 09/17/2019 12.8    • PLT 09/17/2019 164    • NRBC 09/17/2019 0    • DIFF TYPE 09/17/2019 AUTOMATED DIFFERENTIAL    • Neutrophil 09/17/2019 86    • LYMPH 09/17/2019 9    • MONO 09/17/2019 4    • EOSIN 09/17/2019 0    • BASO 09/17/2019 0    • Percent Immature Granulo* 09/17/2019 1    • Absolute Neutrophil 09/17/2019 7.0    • Absolute Lymph 09/17/2019 0.7*   • Absolute Mono 09/17/2019 0.3    • Absolute Eos 09/17/2019 0.0*   • Absolute Baso 09/17/2019 0.0    • Absolute Immature Granul* 09/17/2019 0.1    • WBC 09/18/2019 16.7*   • RBC 09/18/2019 2.52*   • HGB 09/18/2019 7.1*   • HCT 09/18/2019 22.2*   • MCV 09/18/2019 88.1    • MCH 09/18/2019 28.2    • MCHC 09/18/2019 32.0    • RDW-CV 09/18/2019 12.9    • PLT 09/18/2019 175    • NRBC 09/18/2019 0    • DIFF TYPE 09/18/2019 MANUAL DIFFERENTIAL    • SEG 09/18/2019 89    • BAND 09/18/2019 2    • LYMPH 09/18/2019 5    • MONO 09/18/2019  3    • EOS 09/18/2019 0    • BASO 09/18/2019 0    • MYELO 09/18/2019 1*   • Absolute Neut 09/18/2019 15.2*   • Absolute Lymph 09/18/2019 0.8*   • Absolute Mono 09/18/2019 0.5    • Absolute Eos 09/18/2019 0.0*   • Absolute Baso 09/18/2019 0.0    • WBC MORPHOLOGY 09/18/2019 NORMAL    • Ovalocytes 09/18/2019 FEW    • Large Platelets 09/18/2019 PRESENT    • HGB 09/18/2019 7.0*   • FIBRINOGEN 09/18/2019 331    • RETIC ABS 09/18/2019 47    • RETIC COUNT 09/18/2019 1.9    • IMMATURE RETIC FRACT 09/18/2019 33.6*   • LDH 09/18/2019 112          Impression/Plan:  Ms. Tabitha Epstein is a 26 year old- year old female :     1. Normocytic anemia  In the setting of pregnancy state, likely exacerbated by poor p.o. intake. Not compliant with oral iron supplementation.  Have noted Venofer infusion. It is reasonable to repeat this again.  I also have discussed with her other etiologies of anemia and she is agreeable to workup.  Clinically, she is symptomatic with sinus tachycardia systolic flow murmur dizziness and chest pain. These are all symptoms of progressive anemia.  If patient is refractory to measures with Venofer and she develops hemodynamic compromise, I would opt for 1 unit of packed red blood cell transfusion, especially hemoglobin drops <7  Low B12/folate noted.     2. Sinus tachycardia  V/Q scan negative.  Echocardiogram pending.    Recommendations:  Will complete anemia w/u: check LDH, retic count, smear, haptoglobin, RAHEEM,  Agree with Venofer  Avoiding PRBC transfusion, but will recommend if she does have Hb <7   Recommend checking stool FOBT  B12/folate need repletion.     I have discussed my findings and further recommendations with the patient and spouse and answered all questions to their satifaction and she has verbalized understanding and is in agreement.     Thank you very much  for giving me the opportunity to evaluate and be involved in the care of Tabitha Epstein. Please dont hesitate to contact me with any  further questions.    Angelito Truong MD  Pager: [phone]

## 2023-05-15 RX ORDER — FLUCONAZOLE 150 MG/1
TABLET ORAL
Qty: 2 TABLET | Refills: 1 | Status: SHIPPED | OUTPATIENT
Start: 2023-05-15 | End: 2023-05-22

## 2023-05-22 RX ORDER — FLUCONAZOLE 150 MG/1
TABLET ORAL
Qty: 2 TABLET | Refills: 1 | Status: SHIPPED | OUTPATIENT
Start: 2023-05-22

## 2023-07-14 DIAGNOSIS — E61.1 IRON DEFICIENCY: Primary | ICD-10-CM

## 2023-07-14 RX ORDER — LANOLIN ALCOHOL/MO/W.PET/CERES
1000 CREAM (GRAM) TOPICAL DAILY
Qty: 90 TABLET | Refills: 1 | Status: CANCELLED | OUTPATIENT
Start: 2023-07-14

## 2023-07-14 NOTE — TELEPHONE ENCOUNTER
Spoke to pt regarding lab results/supplements per Dr. Reyes. Pt stated needs B-12 and folic acid called into pharmacy.     Rx Refill Note  Requested Prescriptions     Pending Prescriptions Disp Refills    vitamin B-12 (CYANOCOBALAMIN) 1000 MCG tablet 90 tablet 1     Sig: Take 1 tablet by mouth Daily.    vitamin D (ERGOCALCIFEROL) 1.25 MG (01785 UT) capsule capsule 5 capsule 0     Sig: Take 1 capsule by mouth 1 (One) Time Per Week.      Last office visit with prescribing clinician: 7/13/2023   Last telemedicine visit with prescribing clinician: Visit date not found   Next office visit with prescribing clinician: 9/14/2023                         Would you like a call back once the refill request has been completed: [] Yes [] No    If the office needs to give you a call back, can they leave a voicemail: [] Yes [] No    Kira Oliveira RN  07/14/23, 10:56 CDT

## 2023-07-14 NOTE — TELEPHONE ENCOUNTER
----- Message from Darnell Reyes MD sent at 7/14/2023  6:41 AM CDT -----  Please let patient know as discussed during clinic visit yesterday no need to take any iron supplementation at present.  Recommend continuing with B12 and folic acid p.o. daily.  Thank you

## 2023-07-24 RX ORDER — ERGOCALCIFEROL 1.25 MG/1
50000 CAPSULE ORAL WEEKLY
Qty: 5 CAPSULE | Refills: 0 | Status: SHIPPED | OUTPATIENT
Start: 2023-07-24

## 2023-07-31 RX ORDER — FLUCONAZOLE 150 MG/1
TABLET ORAL
Qty: 2 TABLET | Refills: 1 | Status: SHIPPED | OUTPATIENT
Start: 2023-07-31

## 2023-08-17 ENCOUNTER — TELEMEDICINE (OUTPATIENT)
Dept: ENDOCRINOLOGY | Facility: CLINIC | Age: 48
End: 2023-08-17
Payer: MEDICARE

## 2023-08-17 DIAGNOSIS — E11.65 TYPE 2 DIABETES MELLITUS WITH HYPERGLYCEMIA, WITH LONG-TERM CURRENT USE OF INSULIN: Primary | ICD-10-CM

## 2023-08-17 DIAGNOSIS — E55.9 VITAMIN D DEFICIENCY: ICD-10-CM

## 2023-08-17 DIAGNOSIS — Z79.4 TYPE 2 DIABETES MELLITUS WITH HYPERGLYCEMIA, WITH LONG-TERM CURRENT USE OF INSULIN: Primary | ICD-10-CM

## 2023-08-17 DIAGNOSIS — R21 RASH: ICD-10-CM

## 2023-08-17 RX ORDER — FLUCONAZOLE 150 MG/1
TABLET ORAL
Qty: 2 TABLET | Refills: 3 | Status: SHIPPED | OUTPATIENT
Start: 2023-08-17

## 2023-08-17 RX ORDER — PEN NEEDLE, DIABETIC 30 GX3/16"
1 NEEDLE, DISPOSABLE MISCELLANEOUS 4 TIMES DAILY
Qty: 120 EACH | Refills: 11 | Status: SHIPPED | OUTPATIENT
Start: 2023-08-17

## 2023-08-17 NOTE — PROGRESS NOTES
Chief Complaint  Diabetes     Subjective          Jeanie Jones presents to [facility] ENDOCRINOLOGY  History of Present Illness     You have chosen to receive care through a telehealth visit.  Do you consent to use a video/audio connection for your medical care today? Yes            TELEHEALTH VIDEO VISIT     This a video visit due to [facility] current guidelines for social distancing due to the COVID 19 pandemic        Mode of Visit: Video  Location of patient: home  You have chosen to receive care through a telehealth visit.  The patient has signed the video visit consent form.  The visit included audio and video interaction. No technical issues occurred during this visit.             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     Diagnosed in 2019    Timing constant     Quality not controlled       Severity moderate     Aggravating factors - steroid use     Current diabetes regimen     Insulin by omnipod           Current glucose monitoring     Checks 4 times daily     Tracee using        could not download      Variable     Recent arm surgery and on antibiotics            ---------------------------------------------------------------------------        RASH      Has been treated with prednisone for about 5 years           alleviating factors -- steroids and atarax      Modifying factors include the use of steroids and Atarax           Review of Systems - General ROS: negative            Objective   Vital Signs:   There were no vitals taken for this visit.    Physical Exam  Neurological:      General: No focal deficit present.      Mental Status: She is alert.   Psychiatric:         Mood and Affect: Mood normal.         Thought Content: Thought content normal.         Judgment: Judgment normal.      Result Review :   The following data was reviewed by: LEILANI Aguilar on 03/29/2022:  Common labs           3/24/2023    09:07 5/12/2023    08:33 7/13/2023    11:20   Common Labs   Glucose 200   410    Glucose  351        BUN 17  18     16    Creatinine 0.55  0.5     0.38    Sodium 137  138     136    Potassium 3.4  4.3     4.2    Chloride 99  103     100    Calcium 8.9  8.8     8.7    Albumin 3.9   4.1    Total Bilirubin <0.2   0.4    Alkaline Phosphatase 75   70    AST (SGOT) 13   23    ALT (SGPT) 16   40    WBC 13.40   15.87    Hemoglobin 13.3   17.8    Hematocrit 42.8   53.8    Platelets 370   312       Details          This result is from an external source.                       Assessment and Plan    Diagnoses and all orders for this visit:    1. Type 2 diabetes mellitus with hyperglycemia, with long-term current use of insulin (Primary)  -     Hemoglobin A1c  -     TSH    2. Vitamin D deficiency   -     Hemoglobin A1c  -     TSH    3. Rash    Other orders  -     Insulin Pen Needle (Pen Needles) 32G X 4 MM misc; 1 each 4 (Four) Times a Day. Use 4 x daily, Dx code E11.65  Dispense: 120 each; Refill: 11  -     fluconazole (DIFLUCAN) 150 MG tablet; Take one tablet and repeat 3 days  Dispense: 2 tablet; Refill: 3           Rash ---            Using atarax 50 mg tid --continue       Rash does not appear to be endocrine related      Component      Latest Ref Rng & Units 8/18/2020 8/20/2020   Creatinine, 24H       0.70 - 1.60 g/24 hr   0.66 (L)   Creatinine, Urine      mg/dL   36.9   Urine Volume      mL   1,800   Time (Hours)      hrs   24   5-HIAA, Urine      Undefined mg/L   3.0   5-HIAA, 24H Ur      0.0 - 14.9 mg/24 hr   5.4   Calcitonin      0.0 - 5.0 pg/mL <2.0     Insulin-Like Growth Factor-1      74 - 239 ng/mL 94     MANUEL Direct      Negative Negative     Uric Acid      2.4 - 5.7 mg/dL 2.6     C-Reactive Protein      0.00 - 0.50 mg/dL 0.36     Sed Rate      0 - 20 mm/hr 2     C-Peptide      1.1 - 4.4 ng/mL 5.4 (H)                       -----------        Cushing's medication induced / adrenal insufficiency             Prednisone             on 5 mg tablets       taking one am and one pm      If fever , nausea or vomiting , take 100 mg IM and head to ER                    ------------     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     Lab Results   Component Value Date    HGBA1C 7.7 (H) 03/08/2023             Taking invokana 100 mg daily              Omni pod pump----      I d't have settings    Will have omnipod reach out to her for setting update      The goal is to have a sugar 2 hours after eating less than 180            Taking Ozempic  1 mg weekly           Humalog before meals           Sliding scale     151-200   Give 2 units  201-250  Give 4 units  251-300   Give 6 units   301 -350   Give 8 units   Above 351   Give 10 units                She now has a [employer] personal system     She uses the [employer] data to adjust her insulin dosages           Bone health     Vitamin d def      Taking replacement      She has compression fractures currently       Start calcium plus vitamin d     Order DEXA and submit for eventiy     Most likely this is due to chronic steroid use        Follow Up   No follow-ups on file.  Patient was given instructions and counseling regarding her condition or for health maintenance advice. Please see specific information pulled into the AVS if appropriate.         This document has been electronically signed by LEILANI Aguilar on August 17, 2023 14:57 CDT.

## 2023-09-11 ENCOUNTER — TELEPHONE (OUTPATIENT)
Dept: ONCOLOGY | Facility: CLINIC | Age: 48
End: 2023-09-11
Payer: MEDICARE

## 2023-09-11 NOTE — TELEPHONE ENCOUNTER
Reason for call: Patient called - cancelled 9/14/23 appointment with Dr. Reyes due to upcoming surgery - patient stated Dr. Smallwood at [facility] rqeuesting permission to take her off Eliquis for 2 days - please call and [phone] and ask for Violeta.    (Patient rescheduled Dr. Reyes appointment to 10/12/23    The reason is related to: Dr. Smallwood office requesting phone call to discontinue a medication    Best phone number to return call: [phone] (Violeta [facility])

## 2023-09-12 ENCOUNTER — TELEPHONE (OUTPATIENT)
Dept: ONCOLOGY | Facility: CLINIC | Age: 48
End: 2023-09-12
Payer: MEDICARE

## 2023-09-12 NOTE — TELEPHONE ENCOUNTER
Spoke with patient re appointment with Dr Reyes for surgery clearance date and time was given to the patient

## 2023-09-12 NOTE — TELEPHONE ENCOUNTER
Called pt and made aware per Dr. Reyes needs to come in and be seen with blood work prior to being cleared for surgery. Made pt aware will have  call to set up appt. V/u obtained.

## 2023-09-13 DIAGNOSIS — E61.1 IRON DEFICIENCY: Primary | Chronic | ICD-10-CM

## 2023-09-14 ENCOUNTER — OFFICE VISIT (OUTPATIENT)
Dept: ONCOLOGY | Facility: CLINIC | Age: 48
End: 2023-09-14
Payer: MEDICARE

## 2023-09-14 ENCOUNTER — LAB (OUTPATIENT)
Dept: LAB | Facility: HOSPITAL | Age: 48
End: 2023-09-14
Payer: MEDICARE

## 2023-09-14 ENCOUNTER — LAB (OUTPATIENT)
Dept: ONCOLOGY | Facility: HOSPITAL | Age: 48
End: 2023-09-14
Payer: MEDICARE

## 2023-09-14 VITALS
RESPIRATION RATE: 18 BRPM | BODY MASS INDEX: 24.56 KG/M2 | OXYGEN SATURATION: 96 % | HEART RATE: 113 BPM | WEIGHT: 147.6 LBS | TEMPERATURE: 97.6 F | DIASTOLIC BLOOD PRESSURE: 88 MMHG | SYSTOLIC BLOOD PRESSURE: 142 MMHG

## 2023-09-14 DIAGNOSIS — D72.828 OTHER ELEVATED WHITE BLOOD CELL (WBC) COUNT: Chronic | ICD-10-CM

## 2023-09-14 DIAGNOSIS — D75.1 ERYTHROCYTOSIS: ICD-10-CM

## 2023-09-14 DIAGNOSIS — I82.4Z3 ACUTE DEEP VEIN THROMBOSIS (DVT) OF DISTAL VEIN OF BOTH LOWER EXTREMITIES: ICD-10-CM

## 2023-09-14 DIAGNOSIS — E61.1 IRON DEFICIENCY: Primary | Chronic | ICD-10-CM

## 2023-09-14 DIAGNOSIS — T14.8XXA BRUISING: Chronic | ICD-10-CM

## 2023-09-14 DIAGNOSIS — E61.1 IRON DEFICIENCY: ICD-10-CM

## 2023-09-14 LAB
ALBUMIN SERPL-MCNC: 4.2 G/DL (ref 3.5–5.2)
ALBUMIN/GLOB SERPL: 1.3 G/DL
ALP SERPL-CCNC: 45 U/L (ref 39–117)
ALT SERPL W P-5'-P-CCNC: 21 U/L (ref 1–33)
ANION GAP SERPL CALCULATED.3IONS-SCNC: 12 MMOL/L (ref 5–15)
AST SERPL-CCNC: 18 U/L (ref 1–32)
BASOPHILS # BLD AUTO: 0.08 10*3/MM3 (ref 0–0.2)
BASOPHILS NFR BLD AUTO: 0.5 % (ref 0–1.5)
BILIRUB SERPL-MCNC: 0.4 MG/DL (ref 0–1.2)
BUN SERPL-MCNC: 24 MG/DL (ref 6–20)
BUN/CREAT SERPL: 35.3 (ref 7–25)
CALCIUM SPEC-SCNC: 9 MG/DL (ref 8.6–10.5)
CHLORIDE SERPL-SCNC: 102 MMOL/L (ref 98–107)
CO2 SERPL-SCNC: 25 MMOL/L (ref 22–29)
CREAT SERPL-MCNC: 0.68 MG/DL (ref 0.57–1)
DEPRECATED RDW RBC AUTO: 48.7 FL (ref 37–54)
EGFRCR SERPLBLD CKD-EPI 2021: 108.9 ML/MIN/1.73
EOSINOPHIL # BLD AUTO: 0.02 10*3/MM3 (ref 0–0.4)
EOSINOPHIL NFR BLD AUTO: 0.1 % (ref 0.3–6.2)
ERYTHROCYTE [DISTWIDTH] IN BLOOD BY AUTOMATED COUNT: 14.3 % (ref 12.3–15.4)
FERRITIN SERPL-MCNC: 53.73 NG/ML (ref 13–150)
FOLATE SERPL-MCNC: >20 NG/ML (ref 4.78–24.2)
GLOBULIN UR ELPH-MCNC: 3.3 GM/DL
GLUCOSE SERPL-MCNC: 228 MG/DL (ref 65–99)
HCT VFR BLD AUTO: 50.1 % (ref 34–46.6)
HGB BLD-MCNC: 16.6 G/DL (ref 12–15.9)
IMM GRANULOCYTES # BLD AUTO: 0.2 10*3/MM3 (ref 0–0.05)
IMM GRANULOCYTES NFR BLD AUTO: 1.2 % (ref 0–0.5)
IRON 24H UR-MRATE: 78 MCG/DL (ref 37–145)
IRON SATN MFR SERPL: 15 % (ref 20–50)
LYMPHOCYTES # BLD AUTO: 1.16 10*3/MM3 (ref 0.7–3.1)
LYMPHOCYTES NFR BLD AUTO: 7.2 % (ref 19.6–45.3)
Lab: NORMAL
MCH RBC QN AUTO: 30.9 PG (ref 26.6–33)
MCHC RBC AUTO-ENTMCNC: 33.1 G/DL (ref 31.5–35.7)
MCV RBC AUTO: 93.3 FL (ref 79–97)
MONOCYTES # BLD AUTO: 0.51 10*3/MM3 (ref 0.1–0.9)
MONOCYTES NFR BLD AUTO: 3.2 % (ref 5–12)
NEUTROPHILS NFR BLD AUTO: 14.08 10*3/MM3 (ref 1.7–7)
NEUTROPHILS NFR BLD AUTO: 87.8 % (ref 42.7–76)
NRBC BLD AUTO-RTO: 0 /100 WBC (ref 0–0.2)
PLATELET # BLD AUTO: 278 10*3/MM3 (ref 140–450)
PMV BLD AUTO: 9 FL (ref 6–12)
POST-BLOOD PRESSURE: NORMAL MMHG
POTASSIUM SERPL-SCNC: 3.8 MMOL/L (ref 3.5–5.2)
PRE-HGB: 16.4 G/DL
PRE-PULSE: 92 BPM
PROT SERPL-MCNC: 7.5 G/DL (ref 6–8.5)
RBC # BLD AUTO: 5.37 10*6/MM3 (ref 3.77–5.28)
SODIUM SERPL-SCNC: 139 MMOL/L (ref 136–145)
TIBC SERPL-MCNC: 529 MCG/DL (ref 298–536)
TRANSFERRIN SERPL-MCNC: 355 MG/DL (ref 200–360)
VIT B12 BLD-MCNC: >2000 PG/ML (ref 211–946)
VOLUME COLLECTED: 500 ML
WBC NRBC COR # BLD: 16.05 10*3/MM3 (ref 3.4–10.8)

## 2023-09-14 PROCEDURE — 36415 COLL VENOUS BLD VENIPUNCTURE: CPT

## 2023-09-14 PROCEDURE — 99195 PHLEBOTOMY: CPT

## 2023-09-14 NOTE — PROGRESS NOTES
DATE OF VISIT: 9/14/2023      REASON FOR VISIT: Leukocytosis, erythrocytosis, DVT and pulmonary embolism on Eliquis, iron deficiency      HISTORY OF PRESENT ILLNESS:   48-year-old female with medical problem consisting of hypertension, dyslipidemia, diabetes mellitus, anxiety/depression, Cushing's disease for which patient is currently on chronic prednisone, history of kidney stone, gastric surgery for ulceration in October 2020 has been following up with [facility] since April 1, 2022.  Patient is here for follow-up appointment today.  Complains of wound affecting left elbow for which patient is scheduled to undergo debridement followed by wound VAC next Wednesday at [facility].  Complains of easy bruising.  Denies any bleeding.  Complains of fatigue.  Denies any new lymph node enlargement.              Past Medical History, Past Surgical History, Social History, Family History have been reviewed and are without significant changes except as mentioned.    Review of Systems   A comprehensive 14 point review of systems was performed and was negative except as mentioned in HPI.    Medications:  The current medication list was reviewed in the EMR    ALLERGIES:    Allergies   Allergen Reactions    Adhesive Tape Unknown - Low Severity and Other (See Comments)     Tares skin   Tares skin   Tares skin     Bactrim [Sulfamethoxazole-Trimethoprim] Hives    Ciprofloxacin Hives    Latex Rash     Only when pt wears latex gloves        Objective      Vitals:    09/14/23 0805   BP: 142/88   Pulse: 113   Resp: 18   Temp: 97.6 °F (36.4 °C)   SpO2: 96%   Weight: 67 kg (147 lb 9.6 oz)   PainSc: 6  Comment: left arm          No data to display                Physical Exam  Pulmonary:      Breath sounds: Normal breath sounds.   Neurological:      Mental Status: She is alert and oriented to person, place, and time.         RECENT LABS:  Glucose   Date Value Ref Range Status   09/14/2023 228 (H) 65 - 99 mg/dL Final      Glucose, Arterial   Date Value Ref Range Status   10/24/2020 137 (H) 65 - 95 mmol/L Final     Sodium   Date Value Ref Range Status   09/14/2023 139 136 - 145 mmol/L Final   05/12/2023 138 136 - 145 mmol/L Final     Potassium   Date Value Ref Range Status   09/14/2023 3.8 3.5 - 5.2 mmol/L Final   05/12/2023 4.3 3.5 - 5.1 mmol/L Final     CO2   Date Value Ref Range Status   09/14/2023 25.0 22.0 - 29.0 mmol/L Final     Total CO2   Date Value Ref Range Status   05/12/2023 22 21 - 31 mmol/L Final     Chloride   Date Value Ref Range Status   09/14/2023 102 98 - 107 mmol/L Final   05/12/2023 103 98 - 107 mmol/L Final     Anion Gap   Date Value Ref Range Status   09/14/2023 12.0 5.0 - 15.0 mmol/L Final   05/12/2023 13 (H) 4 - 12 mmol/L Final     Creatinine   Date Value Ref Range Status   09/14/2023 0.68 0.57 - 1.00 mg/dL Final   05/12/2023 0.5 (L) 0.7 - 1.3 mg/dL Final     BUN   Date Value Ref Range Status   09/14/2023 24 (H) 6 - 20 mg/dL Final   05/12/2023 18 7 - 25 mg/dL Final     BUN/Creatinine Ratio   Date Value Ref Range Status   09/14/2023 35.3 (H) 7.0 - 25.0 Final     Calcium   Date Value Ref Range Status   09/14/2023 9.0 8.6 - 10.5 mg/dL Final   05/12/2023 8.8 8.6 - 10.3 mg/dL Final     eGFR Non  Amer   Date Value Ref Range Status   10/26/2020 101 >60 mL/min/1.73 Final     Alkaline Phosphatase   Date Value Ref Range Status   09/14/2023 45 39 - 117 U/L Final   03/08/2023 72 46 - 116 U/L Final     Total Protein   Date Value Ref Range Status   09/14/2023 7.5 6.0 - 8.5 g/dL Final   03/08/2023 6.5 6.4 - 8.2 g/dL Final     ALT (SGPT)   Date Value Ref Range Status   09/14/2023 21 1 - 33 U/L Final   03/08/2023 19 14 - 59 U/L Final     AST (SGOT)   Date Value Ref Range Status   09/14/2023 18 1 - 32 U/L Final   03/08/2023 14 (L) 15 - 37 U/L Final     Total Bilirubin   Date Value Ref Range Status   09/14/2023 0.4 0.0 - 1.2 mg/dL Final   03/08/2023 0.20 0.20 - 1.00 mg/dL Final     Albumin   Date Value Ref Range  Status   09/14/2023 4.2 3.5 - 5.2 g/dL Final   03/08/2023 3.2 (L) 3.4 - 5.0 g/dL Final     Globulin   Date Value Ref Range Status   09/14/2023 3.3 gm/dL Final     Lab Results   Component Value Date    WBC 16.05 (H) 09/14/2023    HGB 16.6 (H) 09/14/2023    HCT 50.1 (H) 09/14/2023    MCV 93.3 09/14/2023     09/14/2023     Lab Results   Component Value Date    NEUTROABS 14.08 (H) 09/14/2023    IRON 78 09/14/2023    IRON 42 07/13/2023    IRON 59 03/24/2023    TIBC 529 09/14/2023    TIBC 426 07/13/2023    TIBC 472 03/24/2023    LABIRON 15 (L) 09/14/2023    LABIRON 10 (L) 07/13/2023    LABIRON 12 (L) 03/24/2023    FERRITIN 53.73 09/14/2023    FERRITIN 89.32 07/13/2023    FERRITIN 22.07 03/24/2023    VIIDGIKG66 >2,000 (H) 09/14/2023    RSZDHIGG82 920 07/13/2023    XXATNMRF48 438 03/24/2023    FOLATE >20.00 09/14/2023    FOLATE >20.00 07/13/2023    FOLATE 10.50 03/24/2023     Lab Results   Component Value Date    [id number] 5.4 08/20/2020         PATHOLOGY:  * Cannot find OR log *         RADIOLOGY DATA :  X-ray elbow left AP and lateral    Result Date: 9/8/2023  No radiographic findings of osteomyelitis of the left elbow. [id number]         Assessment & Plan     1.  DVT with bilateral pulmonary embolism  - Patient was diagnosed with DVT with bilateral pulmonary embolism in May 2022  - Patient was found to have heterozygous mutation involving factor V Leyden in June 2022.  - Due to heterozygous factor V Leiden mutation lifelong anticoagulation has been recommended  - Patient is currently on Eliquis twice daily.  - Patient is scheduled to undergo elbow surgery next Wednesday.  Recommend holding Eliquis for 48 hours prior to surgical procedure.  Recommend resuming Eliquis as soon as after surgery once cleared by surgery team.  - For now we will have patient return to clinic in 3 months with repeat CBC, CMP, iron studies, ferritin, B12, folate and erythropoietin level to be done on that day.    2.   Erythrocytosis:  - Patient had a therapeutic phlebotomy with 300 mL of blood to be removed on July 14, 2023.  - Hemoglobin is 16.6 with hematocrit of 50.1.  Recommend therapeutic phlebotomy with 5 Renamil of blood to be removed today.  - Recommend increasing hydration after phlebotomy to prevent any dizziness    3.  Leukocytosis:  - Secondary to smoking plus inflammation plus chronic prednisone use  - White blood cell count is 16,000.  Will monitor with CBC    4.  Iron deficiency:  - Iron saturation today is 15% with ferritin of 53.  Due to need for phlebotomy would not recommend any iron replacement at present.  Remains on B12 and folic acid p.o. daily.  - B12 and folate level is showing improvement, recommend decreasing B12 and folic acid to 3 times a week.    5.  Cushing's disease on prednisone    6.  Jeanie Jones  reports that she has been smoking cigarettes. She has a 20.00 pack-year smoking history. She has never used smokeless tobacco.. I have educated her on the risk of diseases from using tobacco products such as cancer, COPD, heart disease, and cataracts.     I advised her to quit and she is willing to quit. We have discussed the following method/s for tobacco cessation:  Counseling.  Together we have set a quit date for 2 weeks from today.  She will follow up with me in 3 months or sooner to check on her progress.    I spent 4 minutes counseling the patient.        7.  Advance care planning:  - Patient remains full code.                   PHQ-9 Total Score: 0   -Patient is not homicidal or suicidal.  No acute intervention required.     Jeanie Jones reports a pain score of 6.  Given her pain assessment as noted, treatment options were discussed and the following options were decided upon as a follow-up plan to address the patient's pain: continuation of current treatment plan for pain.         Darnell Reyes MD  9/14/2023  15:53 CDT        Part of this note may be an electronic  transcription/translation of spoken language to printed text using the Dragon Dictation System.          CC:

## 2023-09-15 ENCOUNTER — TELEPHONE (OUTPATIENT)
Dept: ONCOLOGY | Facility: CLINIC | Age: 48
End: 2023-09-15
Payer: MEDICARE

## 2023-09-15 NOTE — TELEPHONE ENCOUNTER
----- Message from Darnell Reyes MD sent at 9/14/2023  3:54 PM CDT -----  Please let patient know, blood glucose was 228 today.  In view of requirement of phlebotomies for elevated hemoglobin will not recommend any iron replacement at present.  Recommend decreasing B12 and folic acid to 3 times a week until next clinic visit.  Thank you

## 2023-09-28 RX ORDER — FLUCONAZOLE 150 MG/1
TABLET ORAL
Qty: 2 TABLET | Refills: 3 | Status: SHIPPED | OUTPATIENT
Start: 2023-09-28

## (undated) DEVICE — SUT VIC 3/0 TIES 18IN J110T

## (undated) DEVICE — SUT VICRYL 3-0 SH-1 PO 18IN J772D

## (undated) DEVICE — SUT ETHLN 2/0 FS 18IN 664H

## (undated) DEVICE — ENDOSCOPIC SEAL URO 1 SIZE FITS ALL: Brand: ENDOSCOPIC SEAL

## (undated) DEVICE — WOUND RETRACTOR AND PROTECTOR: Brand: ALEXIS O WOUND PROTECTOR-RETRACTOR

## (undated) DEVICE — STERILE POLYISOPRENE POWDER-FREE SURGICAL GLOVES WITH EMOLLIENT COATING: Brand: PROTEXIS

## (undated) DEVICE — PK CYSTO LF 60

## (undated) DEVICE — CATH URETRL OPEN END W/CONNECT 5F 70CM

## (undated) DEVICE — SUT VICRYL 4/0 SUTUPAK 18 J109T

## (undated) DEVICE — 3M™ IOBAN™ 2 ANTIMICROBIAL INCISE DRAPE 6651EZ: Brand: IOBAN™ 2

## (undated) DEVICE — CATH MALECOT 4WING 22F

## (undated) DEVICE — FIBR LASR FLEXIVA 365 HIPOWR 1P/U

## (undated) DEVICE — GOWN,NON-REINFORCED,SIRUS,SET IN SLV,XL: Brand: MEDLINE

## (undated) DEVICE — PLUG,CATHETER,DRAINAGE PROTECTOR,TUBE: Brand: MEDLINE

## (undated) DEVICE — SUT VIC 2/0 TIES 18IN J111T

## (undated) DEVICE — GLV SURG TRIUMPH PF LTX 6.5 STRL

## (undated) DEVICE — GW PTFE FIX/CORE FLXTIP .038 3X150CM

## (undated) DEVICE — SUT VIC 3/0 SH 27IN J416H

## (undated) DEVICE — SYS IRR PUMP SGL ACTN VAC SYR 10CC

## (undated) DEVICE — SPNG GZ WOVN 4X4IN 12PLY 10/BX STRL

## (undated) DEVICE — STPLR SKIN VISISTAT WD 35CT

## (undated) DEVICE — SOL PVPI SPRY BETADINE 3OZ

## (undated) DEVICE — GLV SURG SENSICARE PI LF PF 7.5 GRN STRL

## (undated) DEVICE — SOL IRRG H2O PL/BG 1000ML STRL

## (undated) DEVICE — PK MAJ PROC LF 60

## (undated) DEVICE — SPNG LAP 18X18IN LF STRL PK/5

## (undated) DEVICE — GLV SURG SENSICARE PI ORTHO PF SZ7 LF STRL

## (undated) DEVICE — GLV SURG SIGNATURE ESSENTIAL PF LTX SZ6.5

## (undated) DEVICE — BITEBLOCK ENDO W/STRAP 60F A/ LF DISP

## (undated) DEVICE — SUT PROLN 3/0 SH D/A 36IN 8522H

## (undated) DEVICE — GLV SURG NEOLON 2G PF LF 7.5 STRL

## (undated) DEVICE — SUT VIC 2/0 SH 27IN

## (undated) DEVICE — URETERAL ACCESS SHEATH SET: Brand: NAVIGATOR HD

## (undated) DEVICE — GLV SURG NEOLON 2G PF LF 6.5 STRL

## (undated) DEVICE — SOL IRR NACL 0.9PCT 3000ML